# Patient Record
Sex: MALE | Race: WHITE | NOT HISPANIC OR LATINO | Employment: FULL TIME | URBAN - METROPOLITAN AREA
[De-identification: names, ages, dates, MRNs, and addresses within clinical notes are randomized per-mention and may not be internally consistent; named-entity substitution may affect disease eponyms.]

---

## 2017-01-11 ENCOUNTER — CLINICAL SUPPORT (OUTPATIENT)
Dept: FAMILY MEDICINE | Facility: CLINIC | Age: 42
End: 2017-01-11
Payer: COMMERCIAL

## 2017-01-11 DIAGNOSIS — Z28.39 IMMUNIZATION DEFICIENCY: Primary | ICD-10-CM

## 2017-01-11 PROCEDURE — 99999 PR PBB SHADOW E&M-EST. PATIENT-LVL I: CPT | Mod: PBBFAC,,,

## 2017-01-11 PROCEDURE — 90471 IMMUNIZATION ADMIN: CPT | Mod: S$GLB,,, | Performed by: INTERNAL MEDICINE

## 2017-01-11 PROCEDURE — 90715 TDAP VACCINE 7 YRS/> IM: CPT | Mod: S$GLB,,, | Performed by: INTERNAL MEDICINE

## 2017-01-11 NOTE — PROGRESS NOTES
Two patient identifiers verified.  Allergies reviewed.  Tdap IM administered to Lright deltoid per MD order.  Patient tolerated injection well; no redness, bleeding, or bruising noted to injection site.  Patient instructed to remain in clinic setting for 15 minutes.  Verbalizes understanding.

## 2017-01-31 ENCOUNTER — OFFICE VISIT (OUTPATIENT)
Dept: FAMILY MEDICINE | Facility: CLINIC | Age: 42
End: 2017-01-31
Payer: COMMERCIAL

## 2017-01-31 VITALS — TEMPERATURE: 98 F | SYSTOLIC BLOOD PRESSURE: 134 MMHG | HEART RATE: 80 BPM | DIASTOLIC BLOOD PRESSURE: 98 MMHG

## 2017-01-31 DIAGNOSIS — J40 BRONCHITIS: Primary | ICD-10-CM

## 2017-01-31 PROCEDURE — 99999 PR PBB SHADOW E&M-EST. PATIENT-LVL III: CPT | Mod: PBBFAC,,, | Performed by: INTERNAL MEDICINE

## 2017-01-31 PROCEDURE — 1159F MED LIST DOCD IN RCRD: CPT | Mod: S$GLB,,, | Performed by: INTERNAL MEDICINE

## 2017-01-31 PROCEDURE — 99214 OFFICE O/P EST MOD 30 MIN: CPT | Mod: S$GLB,,, | Performed by: INTERNAL MEDICINE

## 2017-01-31 RX ORDER — AMOXICILLIN AND CLAVULANATE POTASSIUM 875; 125 MG/1; MG/1
1 TABLET, FILM COATED ORAL 2 TIMES DAILY
Qty: 10 TABLET | Refills: 0 | Status: SHIPPED | OUTPATIENT
Start: 2017-01-31 | End: 2017-02-05

## 2017-01-31 NOTE — PATIENT INSTRUCTIONS
Finish entire course of antibiotic (Augmentin) even if you start feeling better before you finish.  - Eat yogurt or take probiotic (over the counter) to decrease risk of diarrhea from antibiotic.    Use nasal steroid spray (Flonase, Nasonex, Nasacort, Rhinocort) twice daily until symptoms get better, then decrease to as needed.

## 2017-01-31 NOTE — PROGRESS NOTES
Subjective:        Patient ID: Fitz Venegas Jr. is a 41 y.o. male.    Chief Complaint: URI    HPI   Fitz Venegas Jr. presents with c/o sinus congestion, cough and other cold sx that started last week.  Pt reports fever (Tmax 101), sinus pressure and congestion, discolored rhinorrhea, postnasal drip, sore throat, productive cough and chest congestion.  He has been taking Ibuprofen to control the fever and Theraflu.  Pt think sx were set off by working in an environment that contained mold last week.  UTD with flu shot.  Pt has  baby at home so he has not been sleeping the past 2 days.    Review of Systems  as per HPI      Objective:        Vitals:    17 1236   BP: (!) 134/98   Pulse: 80   Temp: 98.1 °F (36.7 °C)     Physical Exam   Constitutional: He is oriented to person, place, and time. He appears well-developed and well-nourished. No distress.   - congested, hoarse   HENT:   Head: Normocephalic and atraumatic.   Right Ear: External ear normal.   Left Ear: External ear normal.   Mouth/Throat: No oropharyngeal exudate.   - +b/l clear middle ear effusions  - nasal turbinates erythematous and boggy  - oropharynx erythematous; no tonsillar edema   Eyes: Conjunctivae and EOM are normal.   Neck: Normal range of motion. Neck supple.   Cardiovascular: Normal rate, regular rhythm and normal heart sounds.    Pulmonary/Chest: Effort normal and breath sounds normal. No respiratory distress. He has no wheezes. He has no rales.   - no bronchial breath sounds or ronchi   Lymphadenopathy:     He has no cervical adenopathy.   Neurological: He is alert and oriented to person, place, and time.   Skin: Skin is warm and dry.   Vitals reviewed.          Assessment:         1. Bronchitis              Plan:         Fitz was seen today for uri.    Diagnoses and all orders for this visit:    Bronchitis: Given severity of sx, start Augmentin x 5 days.  Counseled pt this could still be viral and may take another 1-2  weeks to clear up.  Recommend continuing Tylenol or NSAIDs for fever and pain; also OTC decongestant, nasal steroid spray, Guaifenesin and DM.  -     amoxicillin-clavulanate 875-125mg (AUGMENTIN) 875-125 mg per tablet; Take 1 tablet by mouth 2 (two) times daily.          Return PRN

## 2017-03-27 ENCOUNTER — OFFICE VISIT (OUTPATIENT)
Dept: FAMILY MEDICINE | Facility: CLINIC | Age: 42
End: 2017-03-27
Payer: COMMERCIAL

## 2017-03-27 VITALS
BODY MASS INDEX: 41.28 KG/M2 | HEIGHT: 73 IN | WEIGHT: 311.5 LBS | TEMPERATURE: 98 F | HEART RATE: 76 BPM | DIASTOLIC BLOOD PRESSURE: 92 MMHG | SYSTOLIC BLOOD PRESSURE: 120 MMHG

## 2017-03-27 DIAGNOSIS — I10 ESSENTIAL HYPERTENSION: Primary | ICD-10-CM

## 2017-03-27 DIAGNOSIS — Z00.00 ANNUAL PHYSICAL EXAM: Primary | ICD-10-CM

## 2017-03-27 PROCEDURE — 99214 OFFICE O/P EST MOD 30 MIN: CPT | Mod: S$GLB,,, | Performed by: INTERNAL MEDICINE

## 2017-03-27 PROCEDURE — 1160F RVW MEDS BY RX/DR IN RCRD: CPT | Mod: S$GLB,,, | Performed by: INTERNAL MEDICINE

## 2017-03-27 PROCEDURE — 3080F DIAST BP >= 90 MM HG: CPT | Mod: S$GLB,,, | Performed by: INTERNAL MEDICINE

## 2017-03-27 PROCEDURE — 99999 PR PBB SHADOW E&M-EST. PATIENT-LVL III: CPT | Mod: PBBFAC,,, | Performed by: INTERNAL MEDICINE

## 2017-03-27 PROCEDURE — 3074F SYST BP LT 130 MM HG: CPT | Mod: S$GLB,,, | Performed by: INTERNAL MEDICINE

## 2017-03-27 RX ORDER — LOSARTAN POTASSIUM AND HYDROCHLOROTHIAZIDE 12.5; 5 MG/1; MG/1
1 TABLET ORAL DAILY
Qty: 30 TABLET | Refills: 2 | Status: SHIPPED | OUTPATIENT
Start: 2017-03-27 | End: 2017-05-26 | Stop reason: SDUPTHER

## 2017-03-27 NOTE — PROGRESS NOTES
"Subjective:        Patient ID: Fitz Venegas Jr. is a 42 y.o. male.    Chief Complaint: Hypertension    HPI   Fitz Venegas Jr. presents with c/o fatigue and not feeling well.  Pt reports generalized fatigue, lack of energy and motivation to exercise.  He reports "spurts" of energy but currently doesn't exercise regularly.  He reports having elevated bp readings lately, up to 180s systolic, 90s diastolic.  He currently takes no bp medications.  He reports episodes of HA, dizziness and blurry vision (out of focus peripheral vision).  Usually HA resolves with NSAID and sx resolve spontaneously after resting or sleeping.  No chest pain, SOB, weakness.    Pt has a  daughter at home but she has been sleeping well at night so pt is sleeping.  He started trying to eat healthier 1 week ago: vegetables, chicken or turkey, limited beef and pork, limited sugar.  Pt has gradually gained weight over the past 4 years.  He used to run and do yoga for exercise.    Review of Systems  as per HPI      Objective:        Vitals:    17 1548   BP: (!) 120/92   Pulse: 76   Temp: 98.2 °F (36.8 °C)     Physical Exam   Constitutional: He is oriented to person, place, and time. He appears well-developed and well-nourished. No distress.   HENT:   Head: Normocephalic and atraumatic.   Right Ear: External ear normal.   Left Ear: External ear normal.   Nose: Nose normal.   Mouth/Throat: Oropharynx is clear and moist.   Eyes: Conjunctivae and EOM are normal.   Neck: Normal range of motion. Neck supple.   Cardiovascular: Normal rate, regular rhythm and normal heart sounds.    Pulmonary/Chest: Effort normal and breath sounds normal. No respiratory distress.   Neurological: He is alert and oriented to person, place, and time. No cranial nerve deficit.   Skin: Skin is warm and dry.   Psychiatric: He has a normal mood and affect. His behavior is normal. Judgment and thought content normal.   Vitals reviewed.          Assessment:     "     1. Essential hypertension              Plan:         Fitz was seen today for hypertension.    Diagnoses and all orders for this visit:    Essential hypertension: Given dizziness, HA and blurry vision, start Losartan/HCTZ 50/12.5mg qd; recommend taking in the AM due to mild diuretic effect.  Continue with healthy diet changes.  Encouraged pt to start regular exercise, walking.  Get home bp monitor and start checking bp once daily in the AM.  Send message in 2 weeks with bp values.  Can likely discontinue bp meds if pt is able to lose weight.  -     losartan-hydrochlorothiazide 50-12.5 mg (HYZAAR) 50-12.5 mg per tablet; Take 1 tablet by mouth once daily.          Return in 2  months for annual exam.    Patient Instructions       Controlling High Blood Pressure  High blood pressure (hypertension) is often called the silent killer. This is because many people who have it dont know it. High blood pressure is defined as 140/90 mm Hg or higher. Know your blood pressure and remember to check it regularly. Doing so can save your life. Here are some things you can do to help control your blood pressure.    Choose heart-healthy foods  · Select low-salt, low-fat foods. Limit sodium intake to 2,400 mg per day or the amount suggested by your healthcare provider.  · Limit canned, dried, cured, packaged, and fast foods. These can contain a lot of salt.  · Eat 8 to 10 servings of fruits and vegetables every day.  · Choose lean meats, fish, or chicken.  · Eat whole-grain pasta, brown rice, and beans.  · Eat 2 to 3 servings of low-fat or fat-free dairy products.  · Ask your doctor about the DASH eating plan. This plan helps reduce blood pressure.  · When you go to a restaurant, ask that your meal be prepared with no added salt.  Maintain a healthy weight  · Ask your healthcare provider how many calories to eat a day. Then stick to that number.  · Ask your healthcare provider what weight range is healthiest for you. If you are  overweight, a weight loss of only 3% to 5% of your body weight can help lower blood pressure. Generally, a good weight loss goal is to lose 10% of your body weight in a year.  · Limit snacks and sweets.  · Get regular exercise.  Get up and get active  · Choose activities you enjoy. Find ones you can do with friends or family. This includes bicycling, dancing, walking, and jogging.  · Park farther away from building entrances.  · Use stairs instead of the elevator.  · When you can, walk or bike instead of driving.  · [place] leaves, garden, or do household repairs.  · Be active at a moderate to vigorous level of physical activity for at least 40 minutes for a minimum of 3 to 4 days a week.   Manage stress  · Make time to relax and enjoy life. Find time to laugh.  · Communicate your concerns with your loved ones and your healthcare provider.  · Visit with family and friends, and keep up with hobbies.  Limit alcohol and quit smoking  · Men should have no more than 2 drinks per day.  · Women should have no more than 1 drink per day.  · Talk with your healthcare provider about quitting smoking. Smoking significantly increases your risk for heart disease and stroke. Ask your healthcare provider about community smoking cessation programs and other options.  Medicines  If lifestyle changes arent enough, your healthcare provider may prescribe high blood pressure medicine. Take all medicines as prescribed. If you have any questions about your medicines, ask your healthcare provider before stopping or changing them.   Date Last Reviewed: 4/27/2016  © 0892-4222 [employer]. [address] [place] [address], [place] [address]. All rights reserved. This information is not intended as a substitute for professional medical care. Always follow your healthcare professional's instructions.

## 2017-03-27 NOTE — PATIENT INSTRUCTIONS
Controlling High Blood Pressure  High blood pressure (hypertension) is often called the silent killer. This is because many people who have it dont know it. High blood pressure is defined as 140/90 mm Hg or higher. Know your blood pressure and remember to check it regularly. Doing so can save your life. Here are some things you can do to help control your blood pressure.    Choose heart-healthy foods  · Select low-salt, low-fat foods. Limit sodium intake to 2,400 mg per day or the amount suggested by your healthcare provider.  · Limit canned, dried, cured, packaged, and fast foods. These can contain a lot of salt.  · Eat 8 to 10 servings of fruits and vegetables every day.  · Choose lean meats, fish, or chicken.  · Eat whole-grain pasta, brown rice, and beans.  · Eat 2 to 3 servings of low-fat or fat-free dairy products.  · Ask your doctor about the DASH eating plan. This plan helps reduce blood pressure.  · When you go to a restaurant, ask that your meal be prepared with no added salt.  Maintain a healthy weight  · Ask your healthcare provider how many calories to eat a day. Then stick to that number.  · Ask your healthcare provider what weight range is healthiest for you. If you are overweight, a weight loss of only 3% to 5% of your body weight can help lower blood pressure. Generally, a good weight loss goal is to lose 10% of your body weight in a year.  · Limit snacks and sweets.  · Get regular exercise.  Get up and get active  · Choose activities you enjoy. Find ones you can do with friends or family. This includes bicycling, dancing, walking, and jogging.  · Park farther away from building entrances.  · Use stairs instead of the elevator.  · When you can, walk or bike instead of driving.  · [place] leaves, garden, or do household repairs.  · Be active at a moderate to vigorous level of physical activity for at least 40 minutes for a minimum of 3 to 4 days a week.   Manage stress  · Make time to relax and enjoy  life. Find time to laugh.  · Communicate your concerns with your loved ones and your healthcare provider.  · Visit with family and friends, and keep up with hobbies.  Limit alcohol and quit smoking  · Men should have no more than 2 drinks per day.  · Women should have no more than 1 drink per day.  · Talk with your healthcare provider about quitting smoking. Smoking significantly increases your risk for heart disease and stroke. Ask your healthcare provider about community smoking cessation programs and other options.  Medicines  If lifestyle changes arent enough, your healthcare provider may prescribe high blood pressure medicine. Take all medicines as prescribed. If you have any questions about your medicines, ask your healthcare provider before stopping or changing them.   Date Last Reviewed: 4/27/2016  © 9684-1809 [employer] [address] [place] [address], [place] [address]. All rights reserved. This information is not intended as a substitute for professional medical care. Always follow your healthcare professional's instructions.

## 2017-05-24 ENCOUNTER — LAB VISIT (OUTPATIENT)
Dept: LAB | Facility: HOSPITAL | Age: 42
End: 2017-05-24
Attending: INTERNAL MEDICINE
Payer: COMMERCIAL

## 2017-05-24 DIAGNOSIS — Z00.00 ANNUAL PHYSICAL EXAM: ICD-10-CM

## 2017-05-24 LAB
ALBUMIN SERPL BCP-MCNC: 3.9 G/DL
ALP SERPL-CCNC: 80 U/L
ALT SERPL W/O P-5'-P-CCNC: 55 U/L
ANION GAP SERPL CALC-SCNC: 10 MMOL/L
AST SERPL-CCNC: 25 U/L
BASOPHILS # BLD AUTO: 0.05 K/UL
BASOPHILS NFR BLD: 0.7 %
BILIRUB SERPL-MCNC: 1.1 MG/DL
BUN SERPL-MCNC: 14 MG/DL
CALCIUM SERPL-MCNC: 9.3 MG/DL
CHLORIDE SERPL-SCNC: 103 MMOL/L
CHOLEST/HDLC SERPL: 5.2 {RATIO}
CO2 SERPL-SCNC: 24 MMOL/L
CREAT SERPL-MCNC: 1 MG/DL
DIFFERENTIAL METHOD: ABNORMAL
EOSINOPHIL # BLD AUTO: 0.2 K/UL
EOSINOPHIL NFR BLD: 3.1 %
ERYTHROCYTE [DISTWIDTH] IN BLOOD BY AUTOMATED COUNT: 12.6 %
EST. GFR  (AFRICAN AMERICAN): >60 ML/MIN/1.73 M^2
EST. GFR  (NON AFRICAN AMERICAN): >60 ML/MIN/1.73 M^2
GLUCOSE SERPL-MCNC: 104 MG/DL
HCT VFR BLD AUTO: 47.3 %
HDL/CHOLESTEROL RATIO: 19.4 %
HDLC SERPL-MCNC: 155 MG/DL
HDLC SERPL-MCNC: 30 MG/DL
HGB BLD-MCNC: 16.2 G/DL
LDLC SERPL CALC-MCNC: 75.6 MG/DL
LYMPHOCYTES # BLD AUTO: 2.6 K/UL
LYMPHOCYTES NFR BLD: 37.3 %
MCH RBC QN AUTO: 31.6 PG
MCHC RBC AUTO-ENTMCNC: 34.2 %
MCV RBC AUTO: 92 FL
MONOCYTES # BLD AUTO: 0.7 K/UL
MONOCYTES NFR BLD: 10.8 %
NEUTROPHILS # BLD AUTO: 3.3 K/UL
NEUTROPHILS NFR BLD: 47.5 %
NONHDLC SERPL-MCNC: 125 MG/DL
PLATELET # BLD AUTO: 228 K/UL
PMV BLD AUTO: 10.7 FL
POTASSIUM SERPL-SCNC: 4.4 MMOL/L
PROT SERPL-MCNC: 7.4 G/DL
RBC # BLD AUTO: 5.13 M/UL
SODIUM SERPL-SCNC: 137 MMOL/L
TRIGL SERPL-MCNC: 247 MG/DL
WBC # BLD AUTO: 6.87 K/UL

## 2017-05-24 PROCEDURE — 36415 COLL VENOUS BLD VENIPUNCTURE: CPT | Mod: PO

## 2017-05-24 PROCEDURE — 85025 COMPLETE CBC W/AUTO DIFF WBC: CPT

## 2017-05-24 PROCEDURE — 80061 LIPID PANEL: CPT

## 2017-05-24 PROCEDURE — 80053 COMPREHEN METABOLIC PANEL: CPT

## 2017-05-26 ENCOUNTER — OFFICE VISIT (OUTPATIENT)
Dept: FAMILY MEDICINE | Facility: CLINIC | Age: 42
End: 2017-05-26
Payer: COMMERCIAL

## 2017-05-26 VITALS — HEART RATE: 72 BPM | TEMPERATURE: 99 F | DIASTOLIC BLOOD PRESSURE: 80 MMHG | SYSTOLIC BLOOD PRESSURE: 110 MMHG

## 2017-05-26 DIAGNOSIS — Z00.00 ANNUAL PHYSICAL EXAM: Primary | ICD-10-CM

## 2017-05-26 DIAGNOSIS — I10 ESSENTIAL HYPERTENSION: ICD-10-CM

## 2017-05-26 DIAGNOSIS — E66.01 MORBID OBESITY WITH BMI OF 40.0-44.9, ADULT: ICD-10-CM

## 2017-05-26 DIAGNOSIS — E78.5 HYPERLIPIDEMIA, UNSPECIFIED HYPERLIPIDEMIA TYPE: ICD-10-CM

## 2017-05-26 DIAGNOSIS — R73.9 BLOOD GLUCOSE ELEVATED: ICD-10-CM

## 2017-05-26 PROCEDURE — 99999 PR PBB SHADOW E&M-EST. PATIENT-LVL III: CPT | Mod: PBBFAC,,, | Performed by: INTERNAL MEDICINE

## 2017-05-26 PROCEDURE — 99396 PREV VISIT EST AGE 40-64: CPT | Mod: S$GLB,,, | Performed by: INTERNAL MEDICINE

## 2017-05-26 RX ORDER — LOSARTAN POTASSIUM AND HYDROCHLOROTHIAZIDE 12.5; 5 MG/1; MG/1
1 TABLET ORAL DAILY
Qty: 90 TABLET | Refills: 3 | Status: ON HOLD | OUTPATIENT
Start: 2017-05-26 | End: 2019-05-03 | Stop reason: HOSPADM

## 2017-05-26 NOTE — PROGRESS NOTES
Subjective:        Patient ID: Fitz Venegas Jr. is a 42 y.o. male.    Chief Complaint: Annual Exam    HPI   Fitz Venegas Jr. presents for annual exam.  Pt has changed his diet, eating more of a Mediterranean diet, decreased fried foods.  He thinks he has been eating more/larger portions so he has not lost any weight.  No regular exercise but planning to start walking in the evenings.  Eats sweets ~once/week.    Review of Systems   Constitutional: Positive for fatigue (2/2 work, weight, baby). Negative for activity change and unexpected weight change.   HENT: Negative for ear pain, hearing loss, sore throat and trouble swallowing.    Eyes: Negative for visual disturbance (no glasses or contacts).   Respiratory: Negative for chest tightness and shortness of breath.    Cardiovascular: Negative for chest pain and leg swelling.   Musculoskeletal: Positive for arthralgias (2/2 weight).   Skin: Negative for rash.   Allergic/Immunologic: Positive for environmental allergies (very mild to pet dander, doesn't need medication).   Neurological: Negative for dizziness and light-headedness.   Psychiatric/Behavioral: Negative for dysphoric mood. The patient is not nervous/anxious.            Objective:        Vitals:    05/26/17 1001   BP: 110/80   Pulse: 72   Temp: 98.5 °F (36.9 °C)     Physical Exam   Constitutional: He is oriented to person, place, and time. He appears well-developed and well-nourished. No distress.   HENT:   Head: Normocephalic and atraumatic.   Right Ear: External ear normal.   Left Ear: External ear normal.   Nose: Nose normal.   Mouth/Throat: Oropharynx is clear and moist.   - bilateral ear canals clear, tympanic membranes visualized - normal color and light reflex   Eyes: Conjunctivae and EOM are normal.   Neck: Normal range of motion. Neck supple. No thyromegaly present.   Cardiovascular: Normal rate, regular rhythm, normal heart sounds and intact distal pulses.    No murmur heard.  Pulmonary/Chest:  Effort normal and breath sounds normal. No respiratory distress.   Abdominal: Soft. He exhibits no distension.   Musculoskeletal: He exhibits no edema.   Neurological: He is alert and oriented to person, place, and time.   Skin: Skin is warm and dry.   Psychiatric: He has a normal mood and affect. His behavior is normal. Judgment and thought content normal.   Vitals reviewed.      Most recent lab results reviewed with patient.    Assessment:         1. Annual physical exam    2. Essential hypertension    3. Morbid obesity with BMI of 40.0-44.9, adult    4. Hyperlipidemia, unspecified hyperlipidemia type    5. Blood glucose elevated              Plan:         Fitz was seen today for annual exam.    Diagnoses and all orders for this visit:    Annual physical exam  - Discussed prostate cancer screening recommendations.  No famhx of prostate CA.    Essential hypertension: Well controlled; continue Losartan/HCTZ 50/12.5mg qd.  -     losartan-hydrochlorothiazide 50-12.5 mg (HYZAAR) 50-12.5 mg per tablet; Take 1 tablet by mouth once daily.    Morbid obesity with BMI of 40.0-44.9, adult    Hyperlipidemia, unspecified hyperlipidemia type    Blood glucose elevated    - Discussed continuing with Mediterranean diet.  Next step: limit carbohydrates and decrease portion sizes.  - Start regular physical activity with walking in the evenings.  Energy level and joint discomfort should improve with weight loss.          Return in 1  year for annual exam or sooner PRN.

## 2019-05-02 PROBLEM — R07.9 CHEST PAIN: Status: ACTIVE | Noted: 2019-05-02

## 2019-05-03 PROBLEM — R07.9 CHEST PAIN: Status: RESOLVED | Noted: 2019-05-02 | Resolved: 2019-05-03

## 2019-10-15 ENCOUNTER — OFFICE VISIT (OUTPATIENT)
Dept: URGENT CARE | Facility: CLINIC | Age: 44
End: 2019-10-15

## 2019-10-15 VITALS
DIASTOLIC BLOOD PRESSURE: 91 MMHG | RESPIRATION RATE: 18 BRPM | SYSTOLIC BLOOD PRESSURE: 140 MMHG | HEIGHT: 74 IN | HEART RATE: 104 BPM | TEMPERATURE: 100 F | BODY MASS INDEX: 40.43 KG/M2 | OXYGEN SATURATION: 92 % | WEIGHT: 315 LBS

## 2019-10-15 DIAGNOSIS — B96.89 ACUTE BACTERIAL BRONCHITIS: ICD-10-CM

## 2019-10-15 DIAGNOSIS — J20.8 ACUTE BACTERIAL BRONCHITIS: ICD-10-CM

## 2019-10-15 DIAGNOSIS — R50.9 FEVER, UNSPECIFIED FEVER CAUSE: Primary | ICD-10-CM

## 2019-10-15 LAB
CTP QC/QA: YES
FLUAV AG NPH QL: NEGATIVE
FLUBV AG NPH QL: NEGATIVE

## 2019-10-15 PROCEDURE — 99214 PR OFFICE/OUTPT VISIT, EST, LEVL IV, 30-39 MIN: ICD-10-PCS | Mod: 25,S$GLB,, | Performed by: EMERGENCY MEDICINE

## 2019-10-15 PROCEDURE — 71046 XR CHEST PA AND LATERAL: ICD-10-PCS | Mod: S$GLB,,, | Performed by: RADIOLOGY

## 2019-10-15 PROCEDURE — 96372 PR INJECTION,THERAP/PROPH/DIAG2ST, IM OR SUBCUT: ICD-10-PCS | Mod: S$GLB,,, | Performed by: EMERGENCY MEDICINE

## 2019-10-15 PROCEDURE — 71046 X-RAY EXAM CHEST 2 VIEWS: CPT | Mod: S$GLB,,, | Performed by: RADIOLOGY

## 2019-10-15 PROCEDURE — 87804 POCT INFLUENZA A/B: ICD-10-PCS | Mod: 59,QW,S$GLB, | Performed by: EMERGENCY MEDICINE

## 2019-10-15 PROCEDURE — 87804 INFLUENZA ASSAY W/OPTIC: CPT | Mod: QW,S$GLB,, | Performed by: EMERGENCY MEDICINE

## 2019-10-15 PROCEDURE — 96372 THER/PROPH/DIAG INJ SC/IM: CPT | Mod: S$GLB,,, | Performed by: EMERGENCY MEDICINE

## 2019-10-15 PROCEDURE — 99214 OFFICE O/P EST MOD 30 MIN: CPT | Mod: 25,S$GLB,, | Performed by: EMERGENCY MEDICINE

## 2019-10-15 RX ORDER — CODEINE PHOSPHATE AND GUAIFENESIN 10; 100 MG/5ML; MG/5ML
5 SOLUTION ORAL EVERY 6 HOURS PRN
Qty: 150 ML | Refills: 0 | Status: SHIPPED | OUTPATIENT
Start: 2019-10-15 | End: 2019-10-25

## 2019-10-15 RX ORDER — AMOXICILLIN AND CLAVULANATE POTASSIUM 875; 125 MG/1; MG/1
1 TABLET, FILM COATED ORAL 2 TIMES DAILY
Qty: 14 TABLET | Refills: 0 | Status: SHIPPED | OUTPATIENT
Start: 2019-10-15 | End: 2019-10-22

## 2019-10-15 RX ORDER — BETAMETHASONE SODIUM PHOSPHATE AND BETAMETHASONE ACETATE 3; 3 MG/ML; MG/ML
9 INJECTION, SUSPENSION INTRA-ARTICULAR; INTRALESIONAL; INTRAMUSCULAR; SOFT TISSUE
Status: COMPLETED | OUTPATIENT
Start: 2019-10-15 | End: 2019-10-15

## 2019-10-15 RX ADMIN — BETAMETHASONE SODIUM PHOSPHATE AND BETAMETHASONE ACETATE 9 MG: 3; 3 INJECTION, SUSPENSION INTRA-ARTICULAR; INTRALESIONAL; INTRAMUSCULAR; SOFT TISSUE at 05:10

## 2019-10-15 NOTE — PROGRESS NOTES
"Subjective:       Patient ID: Fitz Venegas Jr. is a 44 y.o. male.    Vitals:  height is 6' 2" (1.88 m) and weight is 151 kg (333 lb) (abnormal). His tympanic temperature is 99.6 °F (37.6 °C). His blood pressure is 140/91 (abnormal) and his pulse is 104. His respiration is 18 and oxygen saturation is 92% (abnormal).     Chief Complaint: Fever    Fever    This is a new problem. The current episode started in the past 7 days (Saturday). The problem has been gradually worsening. The maximum temperature noted was 100 to 100.9 F. The temperature was taken using a tympanic thermometer. Associated symptoms include congestion, coughing, headaches, muscle aches, nausea, sleepiness and a sore throat. Pertinent negatives include no diarrhea, ear pain, rash, vomiting or wheezing. He has tried NSAIDs (at 8am ) for the symptoms. The treatment provided no relief.   Risk factors: sick contacts    Risk factors comment:  Daughter had an ear infection       Constitution: Positive for fever. Negative for chills, sweating and fatigue.   HENT: Positive for congestion and sore throat. Negative for ear pain, sinus pain, sinus pressure and voice change.    Neck: Negative for painful lymph nodes.   Eyes: Negative for eye redness.   Respiratory: Positive for cough. Negative for chest tightness, sputum production, bloody sputum, COPD, shortness of breath, stridor, wheezing and asthma.    Gastrointestinal: Positive for nausea. Negative for vomiting and diarrhea.   Musculoskeletal: Negative for muscle ache.   Skin: Negative for rash.   Allergic/Immunologic: Negative for seasonal allergies and asthma.   Neurological: Positive for headaches.   Hematologic/Lymphatic: Negative for swollen lymph nodes.       Objective:      Physical Exam   Constitutional: He is oriented to person, place, and time. He appears well-developed and well-nourished. He is cooperative.  Non-toxic appearance. He does not have a sickly appearance. He does not appear ill. No " distress.   Obese   HENT:   Head: Normocephalic and atraumatic.   Right Ear: Hearing, tympanic membrane, external ear and ear canal normal.   Left Ear: Hearing, tympanic membrane, external ear and ear canal normal.   Nose: Mucosal edema and rhinorrhea present. No nasal deformity. No epistaxis. Right sinus exhibits frontal sinus tenderness. Right sinus exhibits no maxillary sinus tenderness. Left sinus exhibits frontal sinus tenderness. Left sinus exhibits no maxillary sinus tenderness.   Mouth/Throat: Uvula is midline, oropharynx is clear and moist and mucous membranes are normal. No trismus in the jaw. Normal dentition. No uvula swelling. No oropharyngeal exudate, posterior oropharyngeal edema or posterior oropharyngeal erythema.   Eyes: Conjunctivae and lids are normal. No scleral icterus.   Neck: Trachea normal, full passive range of motion without pain and phonation normal. Neck supple. No neck rigidity. No edema and no erythema present.   Cardiovascular: Normal rate, regular rhythm, normal heart sounds, intact distal pulses and normal pulses.   Pulmonary/Chest: Effort normal. No respiratory distress. He has no decreased breath sounds. He has no rhonchi. He has rales in the left middle field.   Frequent cough on exam   Abdominal: Normal appearance.   Musculoskeletal: Normal range of motion. He exhibits no edema or deformity.   Neurological: He is alert and oriented to person, place, and time. He exhibits normal muscle tone. Coordination normal.   Skin: Skin is warm, dry, intact, not diaphoretic and not pale.   Psychiatric: He has a normal mood and affect. His speech is normal and behavior is normal. Judgment and thought content normal. Cognition and memory are normal.   Nursing note and vitals reviewed.        Assessment:       1. Fever, unspecified fever cause    2. Acute bacterial bronchitis        Plan:         Fever, unspecified fever cause  -     POCT Influenza A/B  -     X-Ray Chest PA And Lateral; Future;  Expected date: 10/15/2019    Acute bacterial bronchitis    Other orders  -     betamethasone acetate-betamethasone sodium phosphate injection 9 mg  -     guaifenesin-codeine 100-10 mg/5 ml (GUAIFENESIN AC)  mg/5 mL syrup; Take 5 mLs by mouth every 6 (six) hours as needed for Cough.  Dispense: 150 mL; Refill: 0  -     amoxicillin-clavulanate 875-125mg (AUGMENTIN) 875-125 mg per tablet; Take 1 tablet by mouth 2 (two) times daily. for 7 days  Dispense: 14 tablet; Refill: 0     Patient was treated with antibiotics on today's visit due to the presence of rales to his left middle lung fields the concern for developing pneumonia         X-ray Chest Pa And Lateral    Result Date: 10/15/2019  EXAMINATION: XR CHEST PA AND LATERAL CLINICAL HISTORY: Fever, unspecified TECHNIQUE: PA and lateral views of the chest were performed. COMPARISON: 05/02/2019. FINDINGS: The trachea is unremarkable.  The cardiomediastinal silhouette is within normal limits.  The hilar structures are unremarkable.  The hemidiaphragms are within normal limits.  There is no evidence of free air beneath the hemidiaphragms.  There are no pleural effusions.  There is no evidence of a pneumothorax.  There is no evidence of pneumomediastinum.  No airspace opacity is present.  The osseous structures are unremarkable.     No acute process. Electronically signed by: Luke Macias MD Date:    10/15/2019 Time:    17:35      Patient Instructions   Please return here or go to the Emergency Department for any concerns or worsening of condition.      Follow up with Primary Care if not improved in 7-10 Days:  [phone]      Bronchitis, Antibiotic Treatment (Adult)    Bronchitis is an infection of the air passages (bronchial tubes) in your lungs. It often occurs when you have a cold. This illness is contagious during the first few days and is spread through the air by coughing and sneezing, or by direct contact (touching the sick person and then touching your own eyes,  nose, or mouth).  Symptoms of bronchitis include cough with mucus (phlegm) and low-grade fever. Bronchitis usually lasts 7 to 14 days. Mild cases can be treated with simple home remedies. More severe infection is treated with an antibiotic.  Home care  Follow these guidelines when caring for yourself at home:  · If your symptoms are severe, rest at home for the first 2 to 3 days. When you go back to your usual activities, don't let yourself get too tired.  · Do not smoke. Also avoid being exposed to secondhand smoke.  · You may use over-the-counter medicines to control fever or pain, unless another medicine was prescribed. (Note: If you have chronic liver or kidney disease or have ever had a stomach ulcer or gastrointestinal bleeding, talk with your healthcare provider before using these medicines. Also talk to your provider if you are taking medicine to prevent blood clots.) Aspirin should never be given to anyone younger than 18 years of age who is ill with a viral infection or fever. It may cause severe liver or brain damage.  · Your appetite may be poor, so a light diet is fine. Avoid dehydration by drinking 6 to 8 glasses of fluids per day (such as water, soft drinks, sports drinks, juices, tea, or soup). Extra fluids will help loosen secretions in the nose and lungs.  · Over-the-counter cough, cold, and sore-throat medicines will not shorten the length of the illness, but they may be helpful to reduce symptoms. (Note: Do not use decongestants if you have high blood pressure.)  · Finish all antibiotic medicine. Do this even if you are feeling better after only a few days.  Follow-up care  Follow up with your healthcare provider, or as advised. If you had an X-ray or ECG (electrocardiogram), a specialist will review it. You will be notified of any new findings that may affect your care.  Note: If you are age 65 or older, or if you have a chronic lung disease or condition that affects your immune system, or you  smoke, talk to your healthcare provider about having pneumococcal vaccinations and a yearly influenza vaccination (flu shot).  When to seek medical advice  Call your healthcare provider right away if any of these occur:  · Fever of 100.4°F (38°C) or higher  · Coughing up increased amounts of colored sputum  · Weakness, drowsiness, headache, facial pain, ear pain, or a stiff neck  Call 911, or get immediate medical care  Contact emergency services right away if any of these occur.  · Coughing up blood  · Worsening weakness, drowsiness, headache, or stiff neck  · Trouble breathing, wheezing, or pain with breathing  Date Last Reviewed: 9/13/2015  © 9983-9242 [employer]. [address] [place] [address], [place] [address]. All rights reserved. This information is not intended as a substitute for professional medical care. Always follow your healthcare professional's instructions.

## 2019-10-15 NOTE — PATIENT INSTRUCTIONS
Please return here or go to the Emergency Department for any concerns or worsening of condition.      Follow up with Primary Care if not improved in 7-10 Days:  [phone]      Bronchitis, Antibiotic Treatment (Adult)    Bronchitis is an infection of the air passages (bronchial tubes) in your lungs. It often occurs when you have a cold. This illness is contagious during the first few days and is spread through the air by coughing and sneezing, or by direct contact (touching the sick person and then touching your own eyes, nose, or mouth).  Symptoms of bronchitis include cough with mucus (phlegm) and low-grade fever. Bronchitis usually lasts 7 to 14 days. Mild cases can be treated with simple home remedies. More severe infection is treated with an antibiotic.  Home care  Follow these guidelines when caring for yourself at home:  · If your symptoms are severe, rest at home for the first 2 to 3 days. When you go back to your usual activities, don't let yourself get too tired.  · Do not smoke. Also avoid being exposed to secondhand smoke.  · You may use over-the-counter medicines to control fever or pain, unless another medicine was prescribed. (Note: If you have chronic liver or kidney disease or have ever had a stomach ulcer or gastrointestinal bleeding, talk with your healthcare provider before using these medicines. Also talk to your provider if you are taking medicine to prevent blood clots.) Aspirin should never be given to anyone younger than 18 years of age who is ill with a viral infection or fever. It may cause severe liver or brain damage.  · Your appetite may be poor, so a light diet is fine. Avoid dehydration by drinking 6 to 8 glasses of fluids per day (such as water, soft drinks, sports drinks, juices, tea, or soup). Extra fluids will help loosen secretions in the nose and lungs.  · Over-the-counter cough, cold, and sore-throat medicines will not shorten the length of the illness, but they may be helpful to  reduce symptoms. (Note: Do not use decongestants if you have high blood pressure.)  · Finish all antibiotic medicine. Do this even if you are feeling better after only a few days.  Follow-up care  Follow up with your healthcare provider, or as advised. If you had an X-ray or ECG (electrocardiogram), a specialist will review it. You will be notified of any new findings that may affect your care.  Note: If you are age 65 or older, or if you have a chronic lung disease or condition that affects your immune system, or you smoke, talk to your healthcare provider about having pneumococcal vaccinations and a yearly influenza vaccination (flu shot).  When to seek medical advice  Call your healthcare provider right away if any of these occur:  · Fever of 100.4°F (38°C) or higher  · Coughing up increased amounts of colored sputum  · Weakness, drowsiness, headache, facial pain, ear pain, or a stiff neck  Call 911, or get immediate medical care  Contact emergency services right away if any of these occur.  · Coughing up blood  · Worsening weakness, drowsiness, headache, or stiff neck  · Trouble breathing, wheezing, or pain with breathing  Date Last Reviewed: 9/13/2015  © 9949-0217 [facility]. [address] [place] [address], [place] [address]. All rights reserved. This information is not intended as a substitute for professional medical care. Always follow your healthcare professional's instructions.

## 2019-11-17 ENCOUNTER — HOSPITAL ENCOUNTER (EMERGENCY)
Facility: HOSPITAL | Age: 44
Discharge: HOME | End: 2019-11-17
Attending: EMERGENCY MEDICINE

## 2019-11-17 VITALS
SYSTOLIC BLOOD PRESSURE: 135 MMHG | HEIGHT: 73 IN | DIASTOLIC BLOOD PRESSURE: 89 MMHG | WEIGHT: 300 LBS | OXYGEN SATURATION: 96 % | RESPIRATION RATE: 20 BRPM | HEART RATE: 85 BPM | BODY MASS INDEX: 39.76 KG/M2 | TEMPERATURE: 98 F

## 2019-11-17 DIAGNOSIS — R05.8 POST-VIRAL COUGH SYNDROME: Primary | ICD-10-CM

## 2019-11-17 DIAGNOSIS — R05.9 COUGH: ICD-10-CM

## 2019-11-17 DIAGNOSIS — R07.89 RIGHT-SIDED CHEST WALL PAIN: ICD-10-CM

## 2019-11-17 PROCEDURE — 99283 EMERGENCY DEPT VISIT LOW MDM: CPT | Mod: 25

## 2019-11-17 PROCEDURE — 25000003 PHARM REV CODE 250: Performed by: EMERGENCY MEDICINE

## 2019-11-17 PROCEDURE — 99285 PR EMERGENCY DEPT VISIT,LEVEL V: ICD-10-PCS | Mod: ,,, | Performed by: EMERGENCY MEDICINE

## 2019-11-17 PROCEDURE — 99285 EMERGENCY DEPT VISIT HI MDM: CPT | Mod: ,,, | Performed by: EMERGENCY MEDICINE

## 2019-11-17 RX ORDER — NAPROXEN 500 MG/1
500 TABLET ORAL
Status: COMPLETED | OUTPATIENT
Start: 2019-11-17 | End: 2019-11-17

## 2019-11-17 RX ORDER — NAPROXEN SODIUM 550 MG/1
550 TABLET ORAL EVERY 12 HOURS PRN
Qty: 20 TABLET | Refills: 0 | Status: SHIPPED | OUTPATIENT
Start: 2019-11-17 | End: 2022-01-25 | Stop reason: ALTCHOICE

## 2019-11-17 RX ADMIN — NAPROXEN 500 MG: 500 TABLET ORAL at 03:11

## 2019-11-17 NOTE — ED TRIAGE NOTES
Patient reports to the ED today with reports of cough x1 month. Patient states that he was treated for possible bronchitis with amoxicillin and steroids. Patient states the cough never got any better.

## 2019-11-17 NOTE — ED NOTES
Patient identifiers verified and correct for Fitz Venegsa  LOC: The patient is awake, alert and aware of environment with an appropriate affect, the patient is oriented x 3 and speaking appropriately.   APPEARANCE: Patient appears comfortable and in no acute distress, patient is clean and well groomed.  SKIN: The skin is warm and dry, color consistent with ethnicity, patient has normal skin turgor and moist mucus membranes, skin intact, no breakdown or bruising noted.   MUSCULOSKELETAL: Patient moving all extremities spontaneously, no swelling noted.  RESPIRATORY: Airway is open and patent, respirations are spontaneous, patient has a normal effort and rate, no accessory muscle use noted, O2 sats noted at 96% on room air. Reports of cough  CARDIAC: Denies chest pain capillary refill < 3 seconds.   GASTRO: Soft and non tender to palpation, no distention noted, normoactive bowel sounds present in all four quadrants. Pt states bowel movements have been regular.  : Pt denies any pain or frequency with urination.  NEURO: Pt opens eyes spontaneously, behavior appropriate to situation, follows commands, facial expression symmetrical, bilateral hand grasp equal and even, purposeful motor response noted, normal sensation in all extremities when touched with a finger.

## 2019-11-17 NOTE — ED PROVIDER NOTES
Encounter Date: 11/17/2019    SCRIBE #1 NOTE: I, Juan Grayson, am scribing for, and in the presence of,  Dr. Pride. I have scribed the following portions of the note - Other sections scribed: HPI, ROS, PE, MDM.       History     Chief Complaint   Patient presents with    Cough     bronchitis about one month ago, was on abx, cough never got better.      Patient is a 44 year old male presenting with a cough. About a month ago, the patient was diagnosed with bronchitis a month ago. He was given antibiotics and cough syrup to treat his symptoms, but his cough never got better. Cough has caused pain to his ribs.  Endorses nausea for the past week.    The history is provided by the patient.     Review of patient's allergies indicates:  No Known Allergies  Past Medical History:   Diagnosis Date    Obesity (BMI 30-39.9) 4/27/2016    Septic joint of left knee joint 2011     Past Surgical History:   Procedure Laterality Date    CORONARY ANGIOGRAPHY N/A 5/3/2019    Procedure: ANGIOGRAM, CORONARY ARTERY;  Surgeon: Joey Carter MD;  Location: [facility] CATH LAB;  Service: Cardiology;  Laterality: N/A;    KNEE ARTHROSCOPY Left     LEFT HEART CATHETERIZATION Right 5/3/2019    Procedure: Left heart cath;  Surgeon: Joey Carter MD;  Location: [facility] CATH LAB;  Service: Cardiology;  Laterality: Right;     Family History   Problem Relation Age of Onset    Heart disease Paternal Uncle         coronary stents    Coronary artery disease Paternal Grandfather         CABG and multiple stents    Hypertension Maternal Uncle      Social History     Tobacco Use    Smoking status: Former Smoker    Tobacco comment: quit @ age 32; smoked x 10 years   Substance Use Topics    Alcohol use: Yes     Comment: weekends    Drug use: No     Review of Systems  General: No fever.  No chills.  Eyes: No visual changes.  Head: No headache.    Integument: No rashes or lesions.  Chest: Cough.  No shortness of breath.  Cardiovascular: No chest  pain.  Abdomen: No abdominal pain.  Nausea.  Urinary: No abnormal urination.  Neurologic: No focal weakness.  No numbness.  Hematologic: No easy bruising.  Endocrine: No excessive thirst or urination.    Physical Exam     Initial Vitals [11/17/19 1426]   BP Pulse Resp Temp SpO2   (!) 136/92 100 20 98.6 °F (37 °C) 96 %      MAP       --         Physical Exam  Appearance: No acute distress.  Skin: No rashes seen.  Good turgor.  No abrasions.  No ecchymoses.  Eyes: No conjunctival injection.  ENT: Oropharynx clear.    Chest: Clear to auscultation bilaterally.  Good air movement.  No wheezes.  No rhonchi.  Cardiovascular: Regular rate and rhythm.  No murmurs. No gallops. No rubs.  Abdomen: Soft.  Not distended.  No guarding.  No rebound. Mild tenderness to right side  Musculoskeletal: Good range of motion all joints.  No deformities.  Neck supple.  No meningismus.   Neurologic: Motor intact.  Sensation intact.  Cerebellar intact.  Cranial nerves intact.  Mental Status:  Alert and oriented x 3.  Appropriate, conversant.    ED Course   Procedures  Labs Reviewed - No data to display       Imaging Results          X-Ray Chest PA And Lateral (Final result)  Result time 11/17/19 15:06:45    Final result by Will Joshi MD (11/17/19 15:06:45)                 Impression:      1. No acute cardiopulmonary process.      Electronically signed by: Will Joshi MD  Date:    11/17/2019  Time:    15:06             Narrative:    EXAMINATION:  XR CHEST PA AND LATERAL    CLINICAL HISTORY:  Cough    TECHNIQUE:  PA and lateral views of the chest were performed.    COMPARISON:  10/15/2019    FINDINGS:  The cardiomediastinal silhouette is not enlarged.  There is no pleural effusion.  The trachea is midline.  The lungs are symmetrically expanded bilaterally without evidence of acute parenchymal process. No large focal consolidation seen.  There is no pneumothorax.  The osseous structures are remarkable for degenerative changes..                                  Medical Decision Making:   History:   Old Medical Records: I decided to obtain old medical records.  Initial Assessment:   Cough with right sided chest pain with deep breath. Doubt PE, likely musculoskeletal. Will get chest x-ray to confirm.   Independently Interpreted Test(s):   I have ordered and independently interpreted X-rays - see prior notes.  Clinical Tests:   Radiological Study: Ordered and Reviewed  ED Management:  3:32 PM  CXR is negative.  Workup c/w musculoskeletal pain from coughing, no SOB, no atelectasis or PNA, doubt PE.  OK to d/c.           Scribe Attestation:   Scribe #1: I performed the above scribed service and the documentation accurately describes the services I performed. I attest to the accuracy of the note.                          Clinical Impression:       ICD-10-CM ICD-9-CM   1. Post-viral cough syndrome R05 786.2   2. Cough R05 786.2   3. Right-sided chest wall pain R07.89 786.52                             Delgado Pride MD  11/17/19 1626       Delgado Pride MD  11/17/19 1626

## 2021-08-04 ENCOUNTER — IMMUNIZATION (OUTPATIENT)
Dept: INTERNAL MEDICINE | Facility: CLINIC | Age: 46
End: 2021-08-04

## 2021-08-04 DIAGNOSIS — Z23 NEED FOR VACCINATION: Primary | ICD-10-CM

## 2021-08-04 PROCEDURE — 91300 COVID-19, MRNA, LNP-S, PF, 30 MCG/0.3 ML DOSE VACCINE: ICD-10-PCS | Mod: ,,, | Performed by: INTERNAL MEDICINE

## 2021-08-04 PROCEDURE — 91300 COVID-19, MRNA, LNP-S, PF, 30 MCG/0.3 ML DOSE VACCINE: CPT | Mod: ,,, | Performed by: INTERNAL MEDICINE

## 2021-08-04 PROCEDURE — 0001A COVID-19, MRNA, LNP-S, PF, 30 MCG/0.3 ML DOSE VACCINE: CPT | Mod: CV19,,, | Performed by: INTERNAL MEDICINE

## 2021-08-04 PROCEDURE — 0001A COVID-19, MRNA, LNP-S, PF, 30 MCG/0.3 ML DOSE VACCINE: ICD-10-PCS | Mod: CV19,,, | Performed by: INTERNAL MEDICINE

## 2021-08-25 ENCOUNTER — IMMUNIZATION (OUTPATIENT)
Dept: INTERNAL MEDICINE | Facility: CLINIC | Age: 46
End: 2021-08-25

## 2021-08-25 DIAGNOSIS — Z23 NEED FOR VACCINATION: Primary | ICD-10-CM

## 2021-08-25 PROCEDURE — 91300 COVID-19, MRNA, LNP-S, PF, 30 MCG/0.3 ML DOSE VACCINE: CPT | Mod: ,,, | Performed by: INTERNAL MEDICINE

## 2021-08-25 PROCEDURE — 91300 COVID-19, MRNA, LNP-S, PF, 30 MCG/0.3 ML DOSE VACCINE: ICD-10-PCS | Mod: ,,, | Performed by: INTERNAL MEDICINE

## 2021-08-25 PROCEDURE — 0002A COVID-19, MRNA, LNP-S, PF, 30 MCG/0.3 ML DOSE VACCINE: ICD-10-PCS | Mod: CV19,,, | Performed by: INTERNAL MEDICINE

## 2021-08-25 PROCEDURE — 0002A COVID-19, MRNA, LNP-S, PF, 30 MCG/0.3 ML DOSE VACCINE: CPT | Mod: CV19,,, | Performed by: INTERNAL MEDICINE

## 2021-08-28 ENCOUNTER — HOSPITAL ENCOUNTER (EMERGENCY)
Facility: HOSPITAL | Age: 46
Discharge: HOME | End: 2021-08-28
Attending: EMERGENCY MEDICINE

## 2021-08-28 VITALS
OXYGEN SATURATION: 96 % | TEMPERATURE: 98 F | HEIGHT: 73 IN | BODY MASS INDEX: 40.02 KG/M2 | SYSTOLIC BLOOD PRESSURE: 164 MMHG | DIASTOLIC BLOOD PRESSURE: 93 MMHG | WEIGHT: 302 LBS | RESPIRATION RATE: 18 BRPM | HEART RATE: 92 BPM

## 2021-08-28 DIAGNOSIS — I83.899 BLEEDING FROM VARICOSE VEIN: Primary | ICD-10-CM

## 2021-08-28 PROCEDURE — 99282 EMERGENCY DEPT VISIT SF MDM: CPT

## 2021-08-28 PROCEDURE — 25000003 PHARM REV CODE 250: Performed by: EMERGENCY MEDICINE

## 2021-08-28 RX ADMIN — Medication 1 EACH: at 11:08

## 2021-10-07 ENCOUNTER — OFFICE VISIT (OUTPATIENT)
Dept: URGENT CARE | Facility: CLINIC | Age: 46
End: 2021-10-07

## 2021-10-07 VITALS
RESPIRATION RATE: 18 BRPM | OXYGEN SATURATION: 96 % | BODY MASS INDEX: 40.02 KG/M2 | WEIGHT: 302 LBS | DIASTOLIC BLOOD PRESSURE: 98 MMHG | HEIGHT: 73 IN | SYSTOLIC BLOOD PRESSURE: 135 MMHG | TEMPERATURE: 99 F | HEART RATE: 87 BPM

## 2021-10-07 DIAGNOSIS — S76.012A HIP STRAIN, LEFT, INITIAL ENCOUNTER: ICD-10-CM

## 2021-10-07 DIAGNOSIS — S89.92XA INJURY OF LEFT KNEE, INITIAL ENCOUNTER: Primary | ICD-10-CM

## 2021-10-07 DIAGNOSIS — S80.02XA CONTUSION OF LEFT KNEE, INITIAL ENCOUNTER: ICD-10-CM

## 2021-10-07 DIAGNOSIS — S43.52XA SPRAIN OF ACROMIOCLAVICULAR JOINT, LEFT, INITIAL ENCOUNTER: ICD-10-CM

## 2021-10-07 DIAGNOSIS — S49.92XA SHOULDER INJURY, LEFT, INITIAL ENCOUNTER: ICD-10-CM

## 2021-10-07 DIAGNOSIS — S79.912A HIP INJURY, LEFT, INITIAL ENCOUNTER: ICD-10-CM

## 2021-10-07 PROCEDURE — 99214 PR OFFICE/OUTPT VISIT, EST, LEVL IV, 30-39 MIN: ICD-10-PCS | Mod: 25,S$GLB,, | Performed by: INTERNAL MEDICINE

## 2021-10-07 PROCEDURE — 73502 XR HIP WITH PELVIS WHEN PERFORMED, 2 OR 3 VIEWS LEFT: ICD-10-PCS | Mod: FY,TIER,LT,S$GLB | Performed by: RADIOLOGY

## 2021-10-07 PROCEDURE — 73562 XR KNEE 3 VIEW LEFT: ICD-10-PCS | Mod: FY,TIER,LT,S$GLB | Performed by: RADIOLOGY

## 2021-10-07 PROCEDURE — 96372 THER/PROPH/DIAG INJ SC/IM: CPT | Mod: S$GLB,,, | Performed by: INTERNAL MEDICINE

## 2021-10-07 PROCEDURE — 99214 OFFICE O/P EST MOD 30 MIN: CPT | Mod: 25,S$GLB,, | Performed by: INTERNAL MEDICINE

## 2021-10-07 PROCEDURE — 73502 X-RAY EXAM HIP UNI 2-3 VIEWS: CPT | Mod: FY,TIER,LT,S$GLB | Performed by: RADIOLOGY

## 2021-10-07 PROCEDURE — 73030 XR SHOULDER TRAUMA 3 VIEW LEFT: ICD-10-PCS | Mod: FY,TIER,LT,S$GLB | Performed by: RADIOLOGY

## 2021-10-07 PROCEDURE — 96372 PR INJECTION,THERAP/PROPH/DIAG2ST, IM OR SUBCUT: ICD-10-PCS | Mod: S$GLB,,, | Performed by: INTERNAL MEDICINE

## 2021-10-07 PROCEDURE — 73562 X-RAY EXAM OF KNEE 3: CPT | Mod: FY,TIER,LT,S$GLB | Performed by: RADIOLOGY

## 2021-10-07 PROCEDURE — 73030 X-RAY EXAM OF SHOULDER: CPT | Mod: FY,TIER,LT,S$GLB | Performed by: RADIOLOGY

## 2021-10-07 RX ORDER — NAPROXEN 500 MG/1
500 TABLET ORAL 2 TIMES DAILY WITH MEALS
Qty: 20 TABLET | Refills: 0 | Status: SHIPPED | OUTPATIENT
Start: 2021-10-08 | End: 2021-10-18

## 2021-10-07 RX ORDER — KETOROLAC TROMETHAMINE 30 MG/ML
30 INJECTION, SOLUTION INTRAMUSCULAR; INTRAVENOUS
Status: COMPLETED | OUTPATIENT
Start: 2021-10-07 | End: 2021-10-07

## 2021-10-07 RX ADMIN — KETOROLAC TROMETHAMINE 30 MG: 30 INJECTION, SOLUTION INTRAMUSCULAR; INTRAVENOUS at 02:10

## 2022-01-16 ENCOUNTER — PATIENT MESSAGE (OUTPATIENT)
Dept: ADMINISTRATIVE | Facility: OTHER | Age: 47
End: 2022-01-16
Payer: COMMERCIAL

## 2022-01-25 ENCOUNTER — OFFICE VISIT (OUTPATIENT)
Dept: PRIMARY CARE CLINIC | Facility: CLINIC | Age: 47
End: 2022-01-25
Payer: COMMERCIAL

## 2022-01-25 VITALS
WEIGHT: 315 LBS | HEIGHT: 73 IN | SYSTOLIC BLOOD PRESSURE: 132 MMHG | HEART RATE: 83 BPM | RESPIRATION RATE: 17 BRPM | DIASTOLIC BLOOD PRESSURE: 72 MMHG | BODY MASS INDEX: 41.75 KG/M2 | OXYGEN SATURATION: 97 % | TEMPERATURE: 98 F

## 2022-01-25 DIAGNOSIS — Z00.00 ANNUAL PHYSICAL EXAM: Primary | ICD-10-CM

## 2022-01-25 DIAGNOSIS — R06.83 SNORING: ICD-10-CM

## 2022-01-25 PROCEDURE — 1160F RVW MEDS BY RX/DR IN RCRD: CPT | Mod: CPTII,S$GLB,, | Performed by: FAMILY MEDICINE

## 2022-01-25 PROCEDURE — 99999 PR PBB SHADOW E&M-EST. PATIENT-LVL IV: CPT | Mod: PBBFAC,,, | Performed by: FAMILY MEDICINE

## 2022-01-25 PROCEDURE — 1159F PR MEDICATION LIST DOCUMENTED IN MEDICAL RECORD: ICD-10-PCS | Mod: CPTII,S$GLB,, | Performed by: FAMILY MEDICINE

## 2022-01-25 PROCEDURE — 3008F BODY MASS INDEX DOCD: CPT | Mod: CPTII,S$GLB,, | Performed by: FAMILY MEDICINE

## 2022-01-25 PROCEDURE — 3008F PR BODY MASS INDEX (BMI) DOCUMENTED: ICD-10-PCS | Mod: CPTII,S$GLB,, | Performed by: FAMILY MEDICINE

## 2022-01-25 PROCEDURE — 1159F MED LIST DOCD IN RCRD: CPT | Mod: CPTII,S$GLB,, | Performed by: FAMILY MEDICINE

## 2022-01-25 PROCEDURE — 3078F DIAST BP <80 MM HG: CPT | Mod: CPTII,S$GLB,, | Performed by: FAMILY MEDICINE

## 2022-01-25 PROCEDURE — 3075F SYST BP GE 130 - 139MM HG: CPT | Mod: CPTII,S$GLB,, | Performed by: FAMILY MEDICINE

## 2022-01-25 PROCEDURE — 99386 PR PREVENTIVE VISIT,NEW,40-64: ICD-10-PCS | Mod: S$GLB,,, | Performed by: FAMILY MEDICINE

## 2022-01-25 PROCEDURE — 3078F PR MOST RECENT DIASTOLIC BLOOD PRESSURE < 80 MM HG: ICD-10-PCS | Mod: CPTII,S$GLB,, | Performed by: FAMILY MEDICINE

## 2022-01-25 PROCEDURE — 3075F PR MOST RECENT SYSTOLIC BLOOD PRESS GE 130-139MM HG: ICD-10-PCS | Mod: CPTII,S$GLB,, | Performed by: FAMILY MEDICINE

## 2022-01-25 PROCEDURE — 1160F PR REVIEW ALL MEDS BY PRESCRIBER/CLIN PHARMACIST DOCUMENTED: ICD-10-PCS | Mod: CPTII,S$GLB,, | Performed by: FAMILY MEDICINE

## 2022-01-25 PROCEDURE — 99386 PREV VISIT NEW AGE 40-64: CPT | Mod: S$GLB,,, | Performed by: FAMILY MEDICINE

## 2022-01-25 PROCEDURE — 99999 PR PBB SHADOW E&M-EST. PATIENT-LVL IV: ICD-10-PCS | Mod: PBBFAC,,, | Performed by: FAMILY MEDICINE

## 2022-01-25 NOTE — PROGRESS NOTES
[facility] Note    Chief Complaint      Chief Complaint   Patient presents with    Annual Exam       History of Present Illness      Fitz Venegas Jr. is a 46 y.o. male with chronic conditions of HTN, HLD who presents today for:    Screening:   Colon- due at 45   PSA- due at 50    Immunizations:   COVID- boost due feb   Flu- due today   Tetanus - 2017    Activity - AC and refrigeration denia, In NOAC, swims weekly     Diet - egg avocado AM, tuna fruit lunch, fish/chicken dinner. Vegetables daily    No tob  Weekly alcohol      Daytime sleepiness, heavy snoring, witnessed apnea  Hx HTN but normalized off meds, home measurements diastolic in 90s    Patient Care Team:  Amy Salmon MD as PCP - General (Internal Medicine)  Liana Tran LPN as Care Coordinator     Health Maintenance:  Immunization History   Administered Date(s) Administered    COVID-19, MRNA, LN-S, PF (Pfizer) (Purple Cap) 08/04/2021, 08/25/2021    Influenza - Quadrivalent 10/13/2016    Influenza - Quadrivalent - PF *Preferred* (6 months and older) 01/25/2022    Tdap 01/11/2017      Health Maintenance   Topic Date Due    Lipid Panel  05/03/2024    TETANUS VACCINE  01/11/2027    Hepatitis C Screening  Completed        Past Medical History:  Past Medical History:   Diagnosis Date    Obesity (BMI 30-39.9) 4/27/2016    Septic joint of left knee joint 2011       Past Surgical History:   has a past surgical history that includes Knee arthroscopy (Left); Coronary angiography (N/A, 5/3/2019); and Left heart catheterization (Right, 5/3/2019).    Family History:  family history includes Coronary artery disease in his paternal grandfather; Heart attack (age of onset: 53) in his father; Heart disease in his paternal uncle; Hypertension in his maternal uncle.     Social History:  Social History     Tobacco Use    Smoking status: Former Smoker     Types: Cigarettes    Smokeless tobacco: Never Used    Tobacco comment: quit @  "age 32; smoked x 10 years   Substance Use Topics    Alcohol use: Yes     Comment: weekends    Drug use: No       Review of Systems     Medications:  No current outpatient medications on file.     Allergies:  Review of patient's allergies indicates:  No Known Allergies    Physical Exam      Vital Signs  Temp: 97.9 °F (36.6 °C)  Pulse: 83  Resp: 17  SpO2: 97 %  BP: 132/72  BP Location: Left arm  Patient Position: Sitting  Pain Score: 0-No pain  Height and Weight  Height: 6' 1" (185.4 cm)  Weight: (!) 159 kg (350 lb 8.5 oz)  BSA (Calculated - sq m): 2.86 sq meters  BMI (Calculated): 46.3  Weight in (lb) to have BMI = 25: 189.1      Patient Position: Sitting      Physical Exam     Laboratory:  CBC:  Recent Labs   Lab 05/02/19 1108 05/02/19 1108 05/03/19 0149   WBC 6.10   < > 6.50   RBC 5.14   < > 4.78   Hemoglobin 16.3   < > 15.1   Hematocrit 47.0   < > 43.7   Platelets 252   < > 229   MCV 92   < > 91   MCH 31.8 H   < > 31.5 H   MCHC 34.7  --  34.5    < > = values in this interval not displayed.       CMP:  Recent Labs   Lab 05/02/19 1108 05/02/19 1108 05/03/19 0149   Glucose 109   < > 109   Calcium 8.9   < > 8.5 L   Albumin 4.1   < > 3.7   Total Protein 7.5   < > 6.7   Sodium 136   < > 135 L   Potassium 4.2   < > 3.5   CO2 23   < > 22 L   Chloride 105   < > 103   BUN 14   < > 17   Creatinine 0.9   < > 1.0   Alkaline Phosphatase 70   < > 71   ALT 46   < > 39   AST 25   < > 18   Total Bilirubin 0.9  --  0.8    < > = values in this interval not displayed.           URINALYSIS:         LIPIDS:  Recent Labs   Lab 05/03/19 0149   TSH 4.63   HDL 26 L   Cholesterol 154   Triglycerides 275 H   LDL Cholesterol 73   HDL/Cholesterol Ratio 16.9 L   Non-HDL Cholesterol 128   Total Cholesterol/HDL Ratio 5.9 H       TSH:  Recent Labs   Lab 05/03/19 0149   TSH 4.63       A1C:  Recent Labs   Lab 05/03/19 0149   Hemoglobin A1C 5.4       Urine Microalbumin/Cr:          Other:   Recent Labs   Lab 05/03/19  0149   Vit D, " 25-Hydroxy 19 L           Assessment/Plan     Fitz Venegas Jr. is a 46 y.o.male with:    Annual physical exam  -     CBC Auto Differential; Future; Expected date: 01/25/2022  -     Comprehensive Metabolic Panel; Future; Expected date: 01/25/2022  -     Lipid Panel; Future; Expected date: 01/25/2022  -     Hepatitis C Antibody; Future; Expected date: 01/25/2022  -     HIV 1/2 Ag/Ab (4th Gen); Future; Expected date: 01/25/2022  Physical activity, diet, healthy lifestyle, alcohol, tobacco, screenings and immunizations discussed.    Snoring  -     Polysomnogram (CPAP will be added if patient meets diagnostic criteria.); Future     will give shorter followup since he is reporting elevated BP at home. He plans to continue diet and exercise and get CPAP for CASANDRA    Chronic conditions status updated as per HPI.  Other than changes above, cont current medications and maintain follow up with specialists.  Return to clinic in Follow up in about 3 months (around 4/25/2022).      Amy Salmon MD  [facility]

## 2022-01-27 ENCOUNTER — OFFICE VISIT (OUTPATIENT)
Dept: SLEEP MEDICINE | Facility: CLINIC | Age: 47
End: 2022-01-27
Payer: COMMERCIAL

## 2022-01-27 DIAGNOSIS — E78.5 HYPERLIPIDEMIA, UNSPECIFIED HYPERLIPIDEMIA TYPE: Primary | ICD-10-CM

## 2022-01-27 DIAGNOSIS — G47.30 SLEEP APNEA, UNSPECIFIED TYPE: ICD-10-CM

## 2022-01-27 DIAGNOSIS — R06.83 SNORING: ICD-10-CM

## 2022-01-27 DIAGNOSIS — G47.10 HYPERSOMNIA: ICD-10-CM

## 2022-01-27 PROCEDURE — 1159F PR MEDICATION LIST DOCUMENTED IN MEDICAL RECORD: ICD-10-PCS | Mod: CPTII,95,, | Performed by: INTERNAL MEDICINE

## 2022-01-27 PROCEDURE — 1160F PR REVIEW ALL MEDS BY PRESCRIBER/CLIN PHARMACIST DOCUMENTED: ICD-10-PCS | Mod: CPTII,95,, | Performed by: INTERNAL MEDICINE

## 2022-01-27 PROCEDURE — 1159F MED LIST DOCD IN RCRD: CPT | Mod: CPTII,95,, | Performed by: INTERNAL MEDICINE

## 2022-01-27 PROCEDURE — 99202 PR OFFICE/OUTPT VISIT, NEW, LEVL II, 15-29 MIN: ICD-10-PCS | Mod: 95,,, | Performed by: INTERNAL MEDICINE

## 2022-01-27 PROCEDURE — 1160F RVW MEDS BY RX/DR IN RCRD: CPT | Mod: CPTII,95,, | Performed by: INTERNAL MEDICINE

## 2022-01-27 PROCEDURE — 99202 OFFICE O/P NEW SF 15 MIN: CPT | Mod: 95,,, | Performed by: INTERNAL MEDICINE

## 2022-01-27 NOTE — PROGRESS NOTES
Subjective:       Patient ID: Fitz Venegas Jr. is a 46 y.o. male.    Chief Complaint: Sleeping Problem    I had the pleasure of seeing Fitz Venegas Jr. today, who is a 46 y.o. male that presents with observed apnea during sleep.    Fitz Venegas Jr. does not have a CDL.    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    Fitz Venegas Jr. presents with apnea that has been going on for 6-12 months    Bedtime when working ranges from 2100 to 2130.   When not working, bedtime ranges from 2130 to 2400.   Sleep latency ranges from 5 to 10 minutes.     Average number of awakenings is 3-4 and return to sleep is quick.   Wake up time when working is 0700 to 0730.   When not working, wake up time is 0700 to 0900.   Patient does not feel rested upon awakening.    Fitz Venegas Jr. consumes approximately 1 beverages with caffeine daily.   An average of 4 beverages with alcohol are consumed weekly   Medications taken for sleep currently: none  Previous medications taken: none     Fitz Venegas Jr. does experience daytime sleepiness.   Naps are taken about 1 times weekly, usually lasting 30 to 60 minutes.  Fitz currently does operate an automobile.  Fitz Venegas Jr. does not experience drowsiness when driving.   Patient does doze off when sedentary.   Fitz Venegas Jr. does not have auxiliary symptoms of narcolepsy including sleep onset paralysis, hypnagogic hallucinations, sleep attacks and cataplexy    EPWORTH SLEEPINESS SCALE 1/27/2022   Sitting and reading 3   Watching TV 2   Sitting, inactive in a public place (e.g. a theatre or a meeting) 3   As a passenger in a car for an hour without a break 3   Lying down to rest in the afternoon when circumstances permit 3   Sitting and talking to someone 1   Sitting quietly after a lunch without alcohol 3   In a car, while stopped for a few minutes in traffic 2   Total score 20       Fitz Venegas Jr. has a history of snoring.   Snoring is described as severe and  constant.   Apneic episodes have been noticed during sleep.   A witness to sleep is present.   The patient awakens with mouth dryness.      Fitz Venegas Jr. does not have symptoms of Restless Legs Syndrome. Nocturnal leg movements have not been noticed.   The patient does not experience sleep related leg cramps.   There is not a history of parasomnia.    No current outpatient medications on file.     Review of patient's allergies indicates:  No Known Allergies      Past Medical History:   Diagnosis Date    Obesity (BMI 30-39.9) 2016    Septic joint of left knee joint        Past Surgical History:   Procedure Laterality Date    CORONARY ANGIOGRAPHY N/A 5/3/2019    Procedure: ANGIOGRAM, CORONARY ARTERY;  Surgeon: Joey Carter MD;  Location: [facility] CATH LAB;  Service: Cardiology;  Laterality: N/A;    KNEE ARTHROSCOPY Left     LEFT HEART CATHETERIZATION Right 5/3/2019    Procedure: Left heart cath;  Surgeon: Joey Carter MD;  Location: [facility] CATH LAB;  Service: Cardiology;  Laterality: Right;       Family History   Problem Relation Age of Onset    Heart attack Father 53    Heart disease Paternal Uncle         coronary stents    Coronary artery disease Paternal Grandfather         CABG and multiple stents    Hypertension Maternal Uncle        Social History     Socioeconomic History    Marital status: Single   Tobacco Use    Smoking status: Former Smoker     Types: Cigarettes    Smokeless tobacco: Never Used    Tobacco comment: quit @ age 32; smoked x 10 years   Substance and Sexual Activity    Alcohol use: Yes     Comment: weekends    Drug use: No    Sexual activity: Yes     Partners: Female     Birth control/protection: I.U.D.   Social History Narrative    Engaged to Arabella;  daughter Bert    Has 1 daughter from previous marriage           Old medical records.    There were no vitals filed for this visit.           The patient was given open opportunity to ask questions and/or  express concerns about treatment plan.   All questions/concerns were discussed.   Driving precautions were provided.     Two patient identifiers used prior to evaluation.    Thank you for referring Fitz Venegas Jr. for evaluation.             Past Medical History:   Diagnosis Date    Obesity (BMI 30-39.9) 2016    Septic joint of left knee joint      Past Surgical History:   Procedure Laterality Date    CORONARY ANGIOGRAPHY N/A 5/3/2019    Procedure: ANGIOGRAM, CORONARY ARTERY;  Surgeon: Joey Carter MD;  Location: [facility] CATH LAB;  Service: Cardiology;  Laterality: N/A;    KNEE ARTHROSCOPY Left     LEFT HEART CATHETERIZATION Right 5/3/2019    Procedure: Left heart cath;  Surgeon: Joey Carter MD;  Location: [facility] CATH LAB;  Service: Cardiology;  Laterality: Right;     Family History   Problem Relation Age of Onset    Heart attack Father 53    Heart disease Paternal Uncle         coronary stents    Coronary artery disease Paternal Grandfather         CABG and multiple stents    Hypertension Maternal Uncle      Social History     Socioeconomic History    Marital status: Single   Tobacco Use    Smoking status: Former Smoker     Types: Cigarettes    Smokeless tobacco: Never Used    Tobacco comment: quit @ age 32; smoked x 10 years   Substance and Sexual Activity    Alcohol use: Yes     Comment: weekends    Drug use: No    Sexual activity: Yes     Partners: Female     Birth control/protection: I.U.D.   Social History Narrative    Engaged to Arabella;  daughter Bert    Has 1 daughter from previous marriage       No current outpatient medications on file.     No current facility-administered medications for this visit.     Review of patient's allergies indicates:  No Known Allergies    Review of Systems    Objective:      There were no vitals filed for this visit.  Physical Exam    Lab Review:   BMP:   Lab Results   Component Value Date     2019     (L) 2019     K 3.5 05/03/2019     05/03/2019    CO2 22 (L) 05/03/2019    BUN 17 05/03/2019    CREATININE 1.0 05/03/2019    CALCIUM 8.5 (L) 05/03/2019     Diagnostics Review: Echo: Reviewed     Assessment:       1. Hyperlipidemia, unspecified hyperlipidemia type    2. Snoring    3. Sleep apnea, unspecified type    4. Hypersomnia        Plan:       Due to listed symptoms, a polysomnogram is recommended and ordered.   Description of procedure given to patient.   If significant Obstructive Sleep Apnea (CASANDRA) is found during the initial portion of the study, therapy will be initiated with nasal Continuous Positive Airway Pressure (CPAP).   Goals of therapy were discussed, alternative treatments listed and patient agrees to this form of therapy if indicated.   The pathophysiology of CASANDRA was discussed.   The effects of CASANDRA on patient's co-morbid conditions and the increased morbidity and/or mortality associated with this condition were reviewed.   The patient was given open opportunity to ask questions and/or express concerns about treatment plan.   All questions/concerns were discussed.   Driving precautions were provided.       Thank you for referring Fitz Venegas Jr. for evaluation.       The patient location is: home  The chief complaint leading to consultation is: apnea    Visit type: audiovisual    Face to Face time with patient: 14  23 minutes of total time spent on the encounter, which includes face to face time and non-face to face time preparing to see the patient (eg, review of tests), Obtaining and/or reviewing separately obtained history, Documenting clinical information in the electronic or other health record, Independently interpreting results (not separately reported) and communicating results to the patient/family/caregiver, or Care coordination (not separately reported).         Each patient to whom he or she provides medical services by telemedicine is:  (1) informed of the relationship between the physician  and patient and the respective role of any other health care provider with respect to management of the patient; and (2) notified that he or she may decline to receive medical services by telemedicine and may withdraw from such care at any time.    Notes:

## 2022-01-31 ENCOUNTER — TELEPHONE (OUTPATIENT)
Dept: SLEEP MEDICINE | Facility: CLINIC | Age: 47
End: 2022-01-31
Payer: COMMERCIAL

## 2022-01-31 ENCOUNTER — LAB VISIT (OUTPATIENT)
Dept: LAB | Facility: HOSPITAL | Age: 47
End: 2022-01-31
Attending: FAMILY MEDICINE
Payer: COMMERCIAL

## 2022-01-31 ENCOUNTER — TELEPHONE (OUTPATIENT)
Dept: SLEEP MEDICINE | Facility: OTHER | Age: 47
End: 2022-01-31
Payer: COMMERCIAL

## 2022-01-31 DIAGNOSIS — Z00.00 ANNUAL PHYSICAL EXAM: ICD-10-CM

## 2022-01-31 DIAGNOSIS — R73.01 IMPAIRED FASTING GLUCOSE: Primary | ICD-10-CM

## 2022-01-31 LAB
ALBUMIN SERPL BCP-MCNC: 3.8 G/DL (ref 3.5–5.2)
ALP SERPL-CCNC: 85 U/L (ref 55–135)
ALT SERPL W/O P-5'-P-CCNC: 38 U/L (ref 10–44)
ANION GAP SERPL CALC-SCNC: 7 MMOL/L (ref 8–16)
AST SERPL-CCNC: 16 U/L (ref 10–40)
BASOPHILS # BLD AUTO: 0.07 K/UL (ref 0–0.2)
BASOPHILS NFR BLD: 1.1 % (ref 0–1.9)
BILIRUB SERPL-MCNC: 0.8 MG/DL (ref 0.1–1)
BUN SERPL-MCNC: 12 MG/DL (ref 6–20)
CALCIUM SERPL-MCNC: 9.2 MG/DL (ref 8.7–10.5)
CHLORIDE SERPL-SCNC: 106 MMOL/L (ref 95–110)
CHOLEST SERPL-MCNC: 159 MG/DL (ref 120–199)
CHOLEST/HDLC SERPL: 5.1 {RATIO} (ref 2–5)
CO2 SERPL-SCNC: 28 MMOL/L (ref 23–29)
CREAT SERPL-MCNC: 1 MG/DL (ref 0.5–1.4)
DIFFERENTIAL METHOD: ABNORMAL
EOSINOPHIL # BLD AUTO: 0.2 K/UL (ref 0–0.5)
EOSINOPHIL NFR BLD: 2.6 % (ref 0–8)
ERYTHROCYTE [DISTWIDTH] IN BLOOD BY AUTOMATED COUNT: 12.1 % (ref 11.5–14.5)
EST. GFR  (AFRICAN AMERICAN): >60 ML/MIN/1.73 M^2
EST. GFR  (NON AFRICAN AMERICAN): >60 ML/MIN/1.73 M^2
GLUCOSE SERPL-MCNC: 128 MG/DL (ref 70–110)
HCT VFR BLD AUTO: 48.1 % (ref 40–54)
HDLC SERPL-MCNC: 31 MG/DL (ref 40–75)
HDLC SERPL: 19.5 % (ref 20–50)
HGB BLD-MCNC: 16.6 G/DL (ref 14–18)
IMM GRANULOCYTES # BLD AUTO: 0.04 K/UL (ref 0–0.04)
IMM GRANULOCYTES NFR BLD AUTO: 0.6 % (ref 0–0.5)
LDLC SERPL CALC-MCNC: 88.4 MG/DL (ref 63–159)
LYMPHOCYTES # BLD AUTO: 2 K/UL (ref 1–4.8)
LYMPHOCYTES NFR BLD: 30.3 % (ref 18–48)
MCH RBC QN AUTO: 31.8 PG (ref 27–31)
MCHC RBC AUTO-ENTMCNC: 34.5 G/DL (ref 32–36)
MCV RBC AUTO: 92 FL (ref 82–98)
MONOCYTES # BLD AUTO: 0.7 K/UL (ref 0.3–1)
MONOCYTES NFR BLD: 10.6 % (ref 4–15)
NEUTROPHILS # BLD AUTO: 3.5 K/UL (ref 1.8–7.7)
NEUTROPHILS NFR BLD: 54.8 % (ref 38–73)
NONHDLC SERPL-MCNC: 128 MG/DL
NRBC BLD-RTO: 0 /100 WBC
PLATELET # BLD AUTO: 218 K/UL (ref 150–450)
PMV BLD AUTO: 9.4 FL (ref 9.2–12.9)
POTASSIUM SERPL-SCNC: 4.5 MMOL/L (ref 3.5–5.1)
PROT SERPL-MCNC: 7.1 G/DL (ref 6–8.4)
RBC # BLD AUTO: 5.22 M/UL (ref 4.6–6.2)
SODIUM SERPL-SCNC: 141 MMOL/L (ref 136–145)
TRIGL SERPL-MCNC: 198 MG/DL (ref 30–150)
WBC # BLD AUTO: 6.44 K/UL (ref 3.9–12.7)

## 2022-01-31 PROCEDURE — 87389 HIV-1 AG W/HIV-1&-2 AB AG IA: CPT | Performed by: FAMILY MEDICINE

## 2022-01-31 PROCEDURE — 80053 COMPREHEN METABOLIC PANEL: CPT | Performed by: FAMILY MEDICINE

## 2022-01-31 PROCEDURE — 86803 HEPATITIS C AB TEST: CPT | Performed by: FAMILY MEDICINE

## 2022-01-31 PROCEDURE — 80061 LIPID PANEL: CPT | Performed by: FAMILY MEDICINE

## 2022-01-31 PROCEDURE — 85025 COMPLETE CBC W/AUTO DIFF WBC: CPT | Performed by: FAMILY MEDICINE

## 2022-01-31 PROCEDURE — 36415 COLL VENOUS BLD VENIPUNCTURE: CPT | Performed by: FAMILY MEDICINE

## 2022-01-31 NOTE — TELEPHONE ENCOUNTER
Staff reached out to the pt and he did not answer, so I left a message on his vm informing him that the Sleep Lab will contact him in about 2 weeks so schedule. It take about that long to get the PA approved for the study.

## 2022-02-01 ENCOUNTER — PATIENT MESSAGE (OUTPATIENT)
Dept: PRIMARY CARE CLINIC | Facility: CLINIC | Age: 47
End: 2022-02-01
Payer: COMMERCIAL

## 2022-02-01 ENCOUNTER — HOSPITAL ENCOUNTER (OUTPATIENT)
Dept: SLEEP MEDICINE | Facility: OTHER | Age: 47
Discharge: HOME | End: 2022-02-01
Attending: INTERNAL MEDICINE
Payer: COMMERCIAL

## 2022-02-01 DIAGNOSIS — E78.5 HYPERLIPIDEMIA, UNSPECIFIED HYPERLIPIDEMIA TYPE: ICD-10-CM

## 2022-02-01 DIAGNOSIS — G47.33 OSA (OBSTRUCTIVE SLEEP APNEA): Primary | ICD-10-CM

## 2022-02-01 DIAGNOSIS — G47.30 SLEEP APNEA, UNSPECIFIED TYPE: ICD-10-CM

## 2022-02-01 DIAGNOSIS — R06.83 SNORING: ICD-10-CM

## 2022-02-01 DIAGNOSIS — G47.10 HYPERSOMNIA: ICD-10-CM

## 2022-02-01 LAB
HCV AB SERPL QL IA: NEGATIVE
HIV 1+2 AB+HIV1 P24 AG SERPL QL IA: NEGATIVE

## 2022-02-01 PROCEDURE — 95806 PR SLEEP STUDY, UNATTENDED, SIMUL RECORD HR/O2 SAT/RESP FLOW/RESP EFFT: ICD-10-PCS | Mod: 26,,, | Performed by: INTERNAL MEDICINE

## 2022-02-01 PROCEDURE — 95800 SLP STDY UNATTENDED: CPT

## 2022-02-01 PROCEDURE — 95806 SLEEP STUDY UNATT&RESP EFFT: CPT | Mod: 26,,, | Performed by: INTERNAL MEDICINE

## 2022-02-05 DIAGNOSIS — G47.33 OSA (OBSTRUCTIVE SLEEP APNEA): Primary | ICD-10-CM

## 2022-02-05 NOTE — PROCEDURES
"The sleep study that you ordered is complete.  You have ordered sleep LAB services to perform the sleep study for Fitz GOYAL Reecetripplazarus Pereyra.     Please find Sleep Study result in  the "Media tab" of Chart Review menu.     Patient is already established with a Sleep Medicine provider        For any questions, please contact our clinic staff at [phone] to talk to clinical staff.     "

## 2022-02-09 ENCOUNTER — TELEPHONE (OUTPATIENT)
Dept: PRIMARY CARE CLINIC | Facility: CLINIC | Age: 47
End: 2022-02-09
Payer: COMMERCIAL

## 2022-02-09 DIAGNOSIS — G47.33 OSA (OBSTRUCTIVE SLEEP APNEA): Primary | ICD-10-CM

## 2022-03-18 ENCOUNTER — TELEPHONE (OUTPATIENT)
Dept: PRIMARY CARE CLINIC | Facility: CLINIC | Age: 47
End: 2022-03-18
Payer: COMMERCIAL

## 2022-03-19 NOTE — TELEPHONE ENCOUNTER
Pt needs his fasting bloodwork to confirm or disprove diabetes.  Fasting CMP and A1C. Please call him ot confirm they are still ordered and he is going to get them.

## 2022-04-03 ENCOUNTER — PATIENT MESSAGE (OUTPATIENT)
Dept: SLEEP MEDICINE | Facility: CLINIC | Age: 47
End: 2022-04-03
Payer: COMMERCIAL

## 2022-06-15 ENCOUNTER — HOSPITAL ENCOUNTER (EMERGENCY)
Facility: OTHER | Age: 47
Discharge: HOME | End: 2022-06-15
Attending: EMERGENCY MEDICINE
Payer: COMMERCIAL

## 2022-06-15 ENCOUNTER — TELEPHONE (OUTPATIENT)
Dept: PRIMARY CARE CLINIC | Facility: CLINIC | Age: 47
End: 2022-06-15
Payer: COMMERCIAL

## 2022-06-15 VITALS
SYSTOLIC BLOOD PRESSURE: 140 MMHG | RESPIRATION RATE: 20 BRPM | DIASTOLIC BLOOD PRESSURE: 89 MMHG | BODY MASS INDEX: 41.75 KG/M2 | OXYGEN SATURATION: 99 % | HEIGHT: 73 IN | HEART RATE: 85 BPM | WEIGHT: 315 LBS | TEMPERATURE: 99 F

## 2022-06-15 DIAGNOSIS — N20.1 URETEROLITHIASIS: Primary | ICD-10-CM

## 2022-06-15 LAB
ALBUMIN SERPL BCP-MCNC: 3.9 G/DL (ref 3.5–5.2)
ALP SERPL-CCNC: 79 U/L (ref 55–135)
ALT SERPL W/O P-5'-P-CCNC: 36 U/L (ref 10–44)
ANION GAP SERPL CALC-SCNC: 14 MMOL/L (ref 8–16)
AST SERPL-CCNC: 19 U/L (ref 10–40)
BACTERIA #/AREA URNS HPF: ABNORMAL /HPF
BASOPHILS # BLD AUTO: 0.04 K/UL (ref 0–0.2)
BASOPHILS NFR BLD: 0.4 % (ref 0–1.9)
BILIRUB SERPL-MCNC: 1.1 MG/DL (ref 0.1–1)
BILIRUB UR QL STRIP: NEGATIVE
BUN SERPL-MCNC: 20 MG/DL (ref 6–20)
CALCIUM SERPL-MCNC: 9.7 MG/DL (ref 8.7–10.5)
CHLORIDE SERPL-SCNC: 103 MMOL/L (ref 95–110)
CLARITY UR: CLEAR
CO2 SERPL-SCNC: 21 MMOL/L (ref 23–29)
COLOR UR: YELLOW
CREAT SERPL-MCNC: 1.4 MG/DL (ref 0.5–1.4)
DIFFERENTIAL METHOD: ABNORMAL
EOSINOPHIL # BLD AUTO: 0.2 K/UL (ref 0–0.5)
EOSINOPHIL NFR BLD: 1.6 % (ref 0–8)
ERYTHROCYTE [DISTWIDTH] IN BLOOD BY AUTOMATED COUNT: 11.8 % (ref 11.5–14.5)
EST. GFR  (AFRICAN AMERICAN): >60 ML/MIN/1.73 M^2
EST. GFR  (NON AFRICAN AMERICAN): >60 ML/MIN/1.73 M^2
GLUCOSE SERPL-MCNC: 169 MG/DL (ref 70–110)
GLUCOSE UR QL STRIP: NEGATIVE
HCT VFR BLD AUTO: 46.2 % (ref 40–54)
HGB BLD-MCNC: 16.5 G/DL (ref 14–18)
HGB UR QL STRIP: ABNORMAL
IMM GRANULOCYTES # BLD AUTO: 0.04 K/UL (ref 0–0.04)
IMM GRANULOCYTES NFR BLD AUTO: 0.4 % (ref 0–0.5)
KETONES UR QL STRIP: NEGATIVE
LEUKOCYTE ESTERASE UR QL STRIP: NEGATIVE
LYMPHOCYTES # BLD AUTO: 1.5 K/UL (ref 1–4.8)
LYMPHOCYTES NFR BLD: 16.4 % (ref 18–48)
MCH RBC QN AUTO: 32.4 PG (ref 27–31)
MCHC RBC AUTO-ENTMCNC: 35.7 G/DL (ref 32–36)
MCV RBC AUTO: 91 FL (ref 82–98)
MICROSCOPIC COMMENT: ABNORMAL
MONOCYTES # BLD AUTO: 0.7 K/UL (ref 0.3–1)
MONOCYTES NFR BLD: 7 % (ref 4–15)
NEUTROPHILS # BLD AUTO: 6.9 K/UL (ref 1.8–7.7)
NEUTROPHILS NFR BLD: 74.2 % (ref 38–73)
NITRITE UR QL STRIP: NEGATIVE
NRBC BLD-RTO: 0 /100 WBC
PH UR STRIP: 6 [PH] (ref 5–8)
PLATELET # BLD AUTO: 221 K/UL (ref 150–450)
PMV BLD AUTO: 9.6 FL (ref 9.2–12.9)
POTASSIUM SERPL-SCNC: 4 MMOL/L (ref 3.5–5.1)
PROT SERPL-MCNC: 7.3 G/DL (ref 6–8.4)
PROT UR QL STRIP: NEGATIVE
RBC # BLD AUTO: 5.1 M/UL (ref 4.6–6.2)
RBC #/AREA URNS HPF: 15 /HPF (ref 0–4)
SODIUM SERPL-SCNC: 138 MMOL/L (ref 136–145)
SP GR UR STRIP: 1.02 (ref 1–1.03)
URN SPEC COLLECT METH UR: ABNORMAL
UROBILINOGEN UR STRIP-ACNC: NEGATIVE EU/DL
WBC # BLD AUTO: 9.26 K/UL (ref 3.9–12.7)

## 2022-06-15 PROCEDURE — 63600175 PHARM REV CODE 636 W HCPCS: Performed by: EMERGENCY MEDICINE

## 2022-06-15 PROCEDURE — 25000003 PHARM REV CODE 250: Performed by: EMERGENCY MEDICINE

## 2022-06-15 PROCEDURE — 96361 HYDRATE IV INFUSION ADD-ON: CPT

## 2022-06-15 PROCEDURE — 80053 COMPREHEN METABOLIC PANEL: CPT | Performed by: EMERGENCY MEDICINE

## 2022-06-15 PROCEDURE — 99285 EMERGENCY DEPT VISIT HI MDM: CPT | Mod: 25

## 2022-06-15 PROCEDURE — 85025 COMPLETE CBC W/AUTO DIFF WBC: CPT | Performed by: EMERGENCY MEDICINE

## 2022-06-15 PROCEDURE — 96374 THER/PROPH/DIAG INJ IV PUSH: CPT

## 2022-06-15 PROCEDURE — 81000 URINALYSIS NONAUTO W/SCOPE: CPT | Performed by: EMERGENCY MEDICINE

## 2022-06-15 RX ORDER — TAMSULOSIN HYDROCHLORIDE 0.4 MG/1
0.4 CAPSULE ORAL DAILY
Qty: 30 CAPSULE | Refills: 2 | Status: SHIPPED | OUTPATIENT
Start: 2022-06-15 | End: 2022-07-15

## 2022-06-15 RX ORDER — IBUPROFEN 600 MG/1
600 TABLET ORAL EVERY 6 HOURS PRN
Qty: 20 TABLET | Refills: 0 | Status: SHIPPED | OUTPATIENT
Start: 2022-06-15 | End: 2023-01-20

## 2022-06-15 RX ORDER — KETOROLAC TROMETHAMINE 30 MG/ML
15 INJECTION, SOLUTION INTRAMUSCULAR; INTRAVENOUS
Status: COMPLETED | OUTPATIENT
Start: 2022-06-15 | End: 2022-06-15

## 2022-06-15 RX ORDER — TAMSULOSIN HYDROCHLORIDE 0.4 MG/1
0.4 CAPSULE ORAL
Status: COMPLETED | OUTPATIENT
Start: 2022-06-15 | End: 2022-06-15

## 2022-06-15 RX ORDER — OXYCODONE AND ACETAMINOPHEN 5; 325 MG/1; MG/1
1 TABLET ORAL EVERY 4 HOURS PRN
Qty: 18 TABLET | Refills: 0 | Status: SHIPPED | OUTPATIENT
Start: 2022-06-15 | End: 2023-01-12

## 2022-06-15 RX ORDER — ONDANSETRON 4 MG/1
4 TABLET, ORALLY DISINTEGRATING ORAL EVERY 6 HOURS PRN
Qty: 12 TABLET | Refills: 0 | Status: SHIPPED | OUTPATIENT
Start: 2022-06-15 | End: 2023-01-12

## 2022-06-15 RX ORDER — OXYCODONE AND ACETAMINOPHEN 5; 325 MG/1; MG/1
1 TABLET ORAL
Status: COMPLETED | OUTPATIENT
Start: 2022-06-15 | End: 2022-06-15

## 2022-06-15 RX ADMIN — SODIUM CHLORIDE 1000 ML: 0.9 INJECTION, SOLUTION INTRAVENOUS at 12:06

## 2022-06-15 RX ADMIN — KETOROLAC TROMETHAMINE 15 MG: 30 INJECTION, SOLUTION INTRAMUSCULAR at 12:06

## 2022-06-15 RX ADMIN — TAMSULOSIN HYDROCHLORIDE 0.4 MG: 0.4 CAPSULE ORAL at 12:06

## 2022-06-15 RX ADMIN — OXYCODONE HYDROCHLORIDE AND ACETAMINOPHEN 1 TABLET: 5; 325 TABLET ORAL at 12:06

## 2022-06-15 NOTE — DISCHARGE INSTRUCTIONS
Drink plenty of fluids.  A referral was placed with Urology, please follow closely with them and strain your urine in the interim.

## 2022-06-15 NOTE — TELEPHONE ENCOUNTER
----- Message from [place] sent at 6/15/2022  8:12 AM CDT -----  Regarding: advice  Contact: nabil [phone]  [email or web address]     Patient is calling for Medical Advice regarding: Extreme Kidney pains  - cloudy urine    How long has patient had these symptoms: 3 days  - constant since 1:00am    Pharmacy name and phone#:    Would like response via [facility]:  Please call    Comments: Wants call back before going to ED

## 2022-06-15 NOTE — TELEPHONE ENCOUNTER
Pt reports intermittent kidney pain and pain in his testicles over the last 2 weeks. Previously if he took a hot shower/bath and drank water the pain would decrease. Last night at 2 AM the pain became severe and constant. Patient advised to go to the ED for evaluation & treatment.

## 2022-06-15 NOTE — ED PROVIDER NOTES
Encounter Date: 6/15/2022    SCRIBE #1 NOTE: Marissa VELASCO, am scribing for, and in the presence of, Gabrielle Almanza MD.       History     Chief Complaint   Patient presents with    Flank Pain     To ed with c/o intermittent L flank pain for two weeks. C/o nausea and vomiting. Denies painful urination. Reports foul smelling urine. Denies fever.      Time seen by provider: 12:06 PM    This is a 47 y.o. male who presents with a complaint of intermittent left flank pain for the past 2 weeks. Patient states the pain does not radiate. He rates his current pain a 5/10, but notes the pain waxes and wanes. He reports taking Aleve with some relief. Patient further complains of nausea and vomiting this morning. He denies a PMHx of kidney stones. He has no known allergies. He is not taking any daily medications. Patient states he drinks alcohol and smokes cigars occasionally. This is the extent of the patient's complaints at this time.    The history is provided by the patient.     Review of patient's allergies indicates:  No Known Allergies  Past Medical History:   Diagnosis Date    Obesity (BMI 30-39.9) 4/27/2016    Septic joint of left knee joint 2011     Past Surgical History:   Procedure Laterality Date    CORONARY ANGIOGRAPHY N/A 5/3/2019    Procedure: ANGIOGRAM, CORONARY ARTERY;  Surgeon: Joey Carter MD;  Location: [facility] CATH LAB;  Service: Cardiology;  Laterality: N/A;    KNEE ARTHROSCOPY Left     LEFT HEART CATHETERIZATION Right 5/3/2019    Procedure: Left heart cath;  Surgeon: Joey Carter MD;  Location: [facility] CATH LAB;  Service: Cardiology;  Laterality: Right;     Family History   Problem Relation Age of Onset    Heart attack Father 53    Heart disease Paternal Uncle         coronary stents    Coronary artery disease Paternal Grandfather         CABG and multiple stents    Hypertension Maternal Uncle      Social History     Tobacco Use    Smoking status: Former Smoker     Types: Cigarettes     Smokeless tobacco: Never Used    Tobacco comment: quit @ age 32; smoked x 10 years   Substance Use Topics    Alcohol use: Yes     Comment: weekends    Drug use: No     Review of Systems   Constitutional: Negative for chills and fever.   HENT: Negative for congestion and sore throat.    Eyes: Negative for visual disturbance.   Respiratory: Negative for cough and shortness of breath.    Cardiovascular: Negative for chest pain and palpitations.   Gastrointestinal: Positive for nausea and vomiting. Negative for abdominal pain and diarrhea.   Genitourinary: Positive for flank pain. Negative for decreased urine volume, dysuria and frequency.   Musculoskeletal: Negative for joint swelling, neck pain and neck stiffness.   Skin: Negative for rash and wound.   Neurological: Negative for weakness, numbness and headaches.   Psychiatric/Behavioral: Negative for behavioral problems and confusion.       Physical Exam     Initial Vitals [06/15/22 0918]   BP Pulse Resp Temp SpO2   (!) 195/90 76 20 98.6 °F (37 °C) 95 %      MAP       --         Physical Exam    Nursing note and vitals reviewed.  Constitutional: He appears well-developed and well-nourished.   HENT:   Head: Normocephalic and atraumatic.   Nose: Nose normal.   Mouth/Throat: Oropharynx is clear and moist.   Eyes: Conjunctivae and EOM are normal. Pupils are equal, round, and reactive to light.   Neck: Neck supple.   Normal range of motion.  Cardiovascular: Normal rate and regular rhythm. Exam reveals no gallop and no friction rub.    No murmur heard.  Pulmonary/Chest: Breath sounds normal. No respiratory distress. He has no wheezes. He has no rales.   Abdominal: Abdomen is soft. Bowel sounds are normal. There is no abdominal tenderness. There is no rebound and no guarding.   Musculoskeletal:         General: No tenderness or edema.      Cervical back: Normal range of motion and neck supple.     Neurological: He is alert and oriented to person, place, and time. He  has normal strength. No cranial nerve deficit or sensory deficit. Gait normal. GCS score is 15. GCS eye subscore is 4. GCS verbal subscore is 5. GCS motor subscore is 6.   Skin: Skin is warm and dry. No rash noted.   Psychiatric: He has a normal mood and affect. His speech is normal and behavior is normal.         ED Course   Procedures  Labs Reviewed   URINALYSIS, REFLEX TO URINE CULTURE - Abnormal; Notable for the following components:       Result Value    Occult Blood UA 2+ (*)     All other components within normal limits    Narrative:     Specimen Source->Urine   URINALYSIS MICROSCOPIC - Abnormal; Notable for the following components:    RBC, UA 15 (*)     All other components within normal limits    Narrative:     Specimen Source->Urine   CBC W/ AUTO DIFFERENTIAL - Abnormal; Notable for the following components:    MCH 32.4 (*)     Gran % 74.2 (*)     Lymph % 16.4 (*)     All other components within normal limits   COMPREHENSIVE METABOLIC PANEL - Abnormal; Notable for the following components:    CO2 21 (*)     Glucose 169 (*)     Total Bilirubin 1.1 (*)     All other components within normal limits          Imaging Results           CT Renal Stone Study ABD Pelvis WO (Final result)  Result time 06/15/22 13:04:52    Final result by Pratik Jordan MD (06/15/22 13:04:52)                 Impression:      1. Mild left-sided hydroureteronephrosis secondary to a 2-3 mm calculus within the distal left ureter.  2. Hepatomegaly and suspected hepatic steatosis.  3. Colonic diverticulosis.  4. [place] mesentery, nonspecific.  Differential considerations can include sequela of recent or remote infectious or inflammatory process, mesenteric panniculitis or malignancy, among others.  Clinical correlation advised.  5. Additional findings as above.  This report was flagged in Epic as abnormal.  This report was flagged in Epic as containing an incidental finding.      Electronically signed by: Pratik Jordan  MD  Date:    06/15/2022  Time:    13:04             Narrative:    EXAMINATION:  CT RENAL STONE STUDY ABD PELVIS WO    CLINICAL HISTORY:  Flank pain, kidney stone suspected;    TECHNIQUE:  Low dose axial images, sagittal and coronal reformations were obtained from the lung bases to the pubic symphysis.  Contrast was not administered.    COMPARISON:  Left hip series 10/07/2021, chest radiographs 11/17/2019    FINDINGS:  Please note that lack of intravenous contrast limits evaluation of soft tissue and vascular structures.    Imaged lung bases are clear.  Base of the heart is within normal limits.    Liver measures 21 cm in maximum coronal length with diffuse parenchymal hypoattenuation and some scattered areas of geographic peripheral sparing.  No discrete hepatic mass seen.    Gallbladder, pancreas, spleen, stomach, duodenum and bilateral adrenal glands are within normal limits.  No biliary ductal dilatation.  Small accessory splenules anterior to the spleen.    Bilateral kidneys are normal in overall size, shape and location.  There is mild left-sided hydroureteronephrosis secondary to a 2-3 mm calculus within the distal left ureter at the level of the acetabular.  There is minimal right and asymmetric slightly more mild left perinephric stranding.  No radiodense calculus seen within the right collecting system, left kidney or urinary bladder.  No right hydronephrosis.  Right ureter is normal in course and caliber.  Urinary bladder is within normal limits.  Prostate and seminal vesicles are within normal limits noting small dystrophic calcifications in the central gland.  Pelvic phleboliths noted.    No ascites or free air.  Numerous shotty mesenteric and retroperitoneal subcentimeter lymph nodes, but nonenlarged by CT criteria.  There is subtle haziness of the midline and left mesentery of the mid abdomen.    Appendix and terminal ileum are within normal limits.  Mild amount of scattered fecal material throughout  the colon.  Numerous scattered colonic diverticula without acute diverticulitis.  No evidence of bowel obstruction or acute inflammation.  No pneumatosis or portal venous gas.    Osseous structures show dextrocurvature of the lumbar spine, degenerative change/disc disease most prominent at L3-4 through L5-S1 and bilateral L5 remote pars defects with associated minimal grade 1 anterolisthesis of L5 on S1.  No acute or destructive osseous process seen..                                 Medications   ketorolac injection 15 mg (15 mg Intravenous Given 6/15/22 1222)   tamsulosin 24 hr capsule 0.4 mg (0.4 mg Oral Given 6/15/22 1223)   sodium chloride 0.9% bolus 1,000 mL (0 mLs Intravenous Stopped 6/15/22 1337)   oxyCODONE-acetaminophen 5-325 mg per tablet 1 tablet (1 tablet Oral Given 6/15/22 1223)     Medical Decision Making:   History:   Old Medical Records: I decided to obtain old medical records.  Clinical Tests:   Lab Tests: Reviewed and Ordered  Radiological Study: Ordered and Reviewed  ED Management:  47-year-old male presents complaint of intermittent left flank plain times several weeks.  Pain became more constant and more severe yesterday.  Vital signs are benign, afebrile.  On exam he appears nontoxic and well-hydrated, no demonstrable CVA or abdominal tenderness.  He did complain of some ongoing nausea, pain rated 5/10.  He is treated with IV fluids, Flomax, analgesics, antiemetics.  Workup reveals 2-3 mm left distal ureteral kidney stone no evidence of infection.  Creatinine is at baseline.  He is advised to strain all urine, increase hydration, and ambulatory referral for Urology is placed.  He is discharged in good condition with pain resolved, given prescriptions for Flomax, ibuprofen, Percocet, Zofran.          Scribe Attestation:   Scribe #1: I performed the above scribed service and the documentation accurately describes the services I performed. I attest to the accuracy of the note.           Physician  Attestation for Scribe: I, Gabrielle Almanza, reviewed documentation as scribed in my presence, which is both accurate and complete.       Clinical Impression:   Final diagnoses:  [N20.1] Ureterolithiasis (Primary)          ED Disposition Condition    Discharge Stable        ED Prescriptions     Medication Sig Dispense Start Date End Date Auth. Provider    tamsulosin (FLOMAX) 0.4 mg Cap Take 1 capsule (0.4 mg total) by mouth once daily. 30 capsule 6/15/2022 7/15/2022 Gabrielle Almanza MD    ibuprofen (ADVIL,MOTRIN) 600 MG tablet Take 1 tablet (600 mg total) by mouth every 6 (six) hours as needed for Pain. 20 tablet 6/15/2022  Gabrielle Almanza MD    oxyCODONE-acetaminophen (PERCOCET) 5-325 mg per tablet Take 1 tablet by mouth every 4 (four) hours as needed for Pain. 18 tablet 6/15/2022  Gabrielle Almanza MD    ondansetron (ZOFRAN-ODT) 4 MG TbDL Take 1 tablet (4 mg total) by mouth every 6 (six) hours as needed (nausea). 12 tablet 6/15/2022  Gabrielle Almanza MD        Follow-up Information     Follow up With Specialties Details Why Contact Info    Joaquim Mendoza MD Urology Schedule an appointment as soon as possible for a visit  for close follow up [address] [address]  [phone]      [facility] Emergency Dept Emergency Medicine  As needed, If symptoms worsen [address] [facility] [id number] [phone]           Gabrielle Almanza MD  06/15/22 1098

## 2022-06-22 ENCOUNTER — OFFICE VISIT (OUTPATIENT)
Dept: UROLOGY | Facility: CLINIC | Age: 47
End: 2022-06-22
Attending: EMERGENCY MEDICINE
Payer: COMMERCIAL

## 2022-06-22 VITALS — SYSTOLIC BLOOD PRESSURE: 129 MMHG | HEART RATE: 77 BPM | DIASTOLIC BLOOD PRESSURE: 76 MMHG

## 2022-06-22 DIAGNOSIS — R35.0 URINARY FREQUENCY: Primary | ICD-10-CM

## 2022-06-22 DIAGNOSIS — R10.9 FLANK PAIN: ICD-10-CM

## 2022-06-22 DIAGNOSIS — N13.2 URETERAL STONE WITH HYDRONEPHROSIS: ICD-10-CM

## 2022-06-22 LAB
BILIRUB SERPL-MCNC: POSITIVE MG/DL
BLOOD URINE, POC: POSITIVE
CLARITY, POC UA: CLEAR
COLOR, POC UA: YELLOW
GLUCOSE UR QL STRIP: NEGATIVE
KETONES UR QL STRIP: POSITIVE
LEUKOCYTE ESTERASE URINE, POC: NEGATIVE
NITRITE, POC UA: NEGATIVE
PH, POC UA: 5
PROTEIN, POC: POSITIVE
SPECIFIC GRAVITY, POC UA: 1.02
UROBILINOGEN, POC UA: POSITIVE

## 2022-06-22 PROCEDURE — 81002 URINALYSIS NONAUTO W/O SCOPE: CPT | Mod: S$GLB,,, | Performed by: NURSE PRACTITIONER

## 2022-06-22 PROCEDURE — 1159F PR MEDICATION LIST DOCUMENTED IN MEDICAL RECORD: ICD-10-PCS | Mod: CPTII,S$GLB,, | Performed by: NURSE PRACTITIONER

## 2022-06-22 PROCEDURE — 99204 OFFICE O/P NEW MOD 45 MIN: CPT | Mod: S$GLB,,, | Performed by: NURSE PRACTITIONER

## 2022-06-22 PROCEDURE — 1159F MED LIST DOCD IN RCRD: CPT | Mod: CPTII,S$GLB,, | Performed by: NURSE PRACTITIONER

## 2022-06-22 PROCEDURE — 81002 POCT URINE DIPSTICK WITHOUT MICROSCOPE: ICD-10-PCS | Mod: S$GLB,,, | Performed by: NURSE PRACTITIONER

## 2022-06-22 PROCEDURE — 87086 URINE CULTURE/COLONY COUNT: CPT | Performed by: NURSE PRACTITIONER

## 2022-06-22 PROCEDURE — 3078F PR MOST RECENT DIASTOLIC BLOOD PRESSURE < 80 MM HG: ICD-10-PCS | Mod: CPTII,S$GLB,, | Performed by: NURSE PRACTITIONER

## 2022-06-22 PROCEDURE — 3074F PR MOST RECENT SYSTOLIC BLOOD PRESSURE < 130 MM HG: ICD-10-PCS | Mod: CPTII,S$GLB,, | Performed by: NURSE PRACTITIONER

## 2022-06-22 PROCEDURE — 3078F DIAST BP <80 MM HG: CPT | Mod: CPTII,S$GLB,, | Performed by: NURSE PRACTITIONER

## 2022-06-22 PROCEDURE — 99204 PR OFFICE/OUTPT VISIT, NEW, LEVL IV, 45-59 MIN: ICD-10-PCS | Mod: S$GLB,,, | Performed by: NURSE PRACTITIONER

## 2022-06-22 PROCEDURE — 1160F RVW MEDS BY RX/DR IN RCRD: CPT | Mod: CPTII,S$GLB,, | Performed by: NURSE PRACTITIONER

## 2022-06-22 PROCEDURE — 3074F SYST BP LT 130 MM HG: CPT | Mod: CPTII,S$GLB,, | Performed by: NURSE PRACTITIONER

## 2022-06-22 PROCEDURE — 1160F PR REVIEW ALL MEDS BY PRESCRIBER/CLIN PHARMACIST DOCUMENTED: ICD-10-PCS | Mod: CPTII,S$GLB,, | Performed by: NURSE PRACTITIONER

## 2022-06-22 RX ORDER — KETOROLAC TROMETHAMINE 10 MG/1
10 TABLET, FILM COATED ORAL EVERY 6 HOURS
Qty: 20 TABLET | Refills: 0 | Status: SHIPPED | OUTPATIENT
Start: 2022-06-22 | End: 2022-06-27

## 2022-06-22 RX ORDER — HYDROCODONE BITARTRATE AND ACETAMINOPHEN 7.5; 325 MG/1; MG/1
1 TABLET ORAL EVERY 6 HOURS PRN
Qty: 12 TABLET | Refills: 0 | Status: SHIPPED | OUTPATIENT
Start: 2022-06-22 | End: 2023-01-12

## 2022-06-22 NOTE — PROGRESS NOTES
"Subjective:      Fitz Venegas Jr. is a 47 y.o. male who was referred by Dr. Almanza for evaluation of his nephrolithiasis.      The patient presented to the ED on 6/15 with intermittent left flank pain x 2 weeks and N/V.     CT stone study demonstrated "mild left-sided hydroureteronephrosis secondary to a 2-3 mm calculus within the distal left ureter at the level of the acetabular.  There is minimal right and asymmetric slightly more mild left perinephric stranding.  No radiodense calculus seen within the right collecting system, left kidney or urinary bladder.  No right hydronephrosis.  Right ureter is normal in course and caliber.  Urinary bladder is within normal limits.  Prostate and seminal vesicles are within normal limits noting small dystrophic calcifications in the central gland.  Pelvic phleboliths noted." UA negative for infection and creatinine at baseline. He was given rx for Flomax, ibuprofen, Percocet, and Zofran.     He presents today reporting continued left flank pain with radiation to his scrotum/groin. States that the pain has been intermittent but much more severe today. Also with urinary frequency and nausea. States that the percocet is only mildly alleviating the pain. Denies dysuria, hematuria and fever/chills.  Remains on Flomax.     The following portions of the patient's history were reviewed and updated as appropriate: allergies, current medications, past family history, past medical history, past social history, past surgical history and problem list.    Review of Systems  Constitutional: no fever or chills  ENT: no nasal congestion or sore throat  Respiratory: no cough or shortness of breath  Cardiovascular: no chest pain or palpitations  Gastrointestinal: no nausea or vomiting, tolerating diet  Genitourinary: as per HPI  Hematologic/Lymphatic: no easy bruising or lymphadenopathy  Musculoskeletal: no arthralgias or myalgias  Neurological: no seizures or tremors  Behavioral/Psych: no " "auditory or visual hallucinations     Objective:   Vitals:   Vitals:    06/22/22 1335   BP: 129/76   Pulse: 77       Physical Exam   General: alert and oriented, no acute distress  Head: normocephalic, atraumatic  Neck: supple, normal ROM  Respiratory: Symmetric expansion, non-labored breathing  Cardiovascular: regular rate and rhythm  Abdomen: soft, non tender, non distended  Genitourinary: deferred  Skin: normal coloration and turgor, no rashes, no suspicious skin lesions noted  Neuro: alert and oriented x3, no gross deficits  Psych: normal judgment and insight, normal mood/affect and non-anxious  + left CVA tenderness    Lab Review   Urinalysis demonstrates positive for red blood cells  Lab Results   Component Value Date    WBC 9.26 06/15/2022    HGB 16.5 06/15/2022    HCT 46.2 06/15/2022    MCV 91 06/15/2022     06/15/2022     Lab Results   Component Value Date    CREATININE 1.4 06/15/2022    BUN 20 06/15/2022     No results found for: PSA  Imaging   (all images personally reviewed; agree with report below)  CT stone study- "CT stone study demonstrated "mild left-sided hydroureteronephrosis secondary to a 2-3 mm calculus within the distal left ureter at the level of the acetabular.  There is minimal right and asymmetric slightly more mild left perinephric stranding.  No radiodense calculus seen within the right collecting system, left kidney or urinary bladder.  No right hydronephrosis.  Right ureter is normal in course and caliber.  Urinary bladder is within normal limits.  Prostate and seminal vesicles are within normal limits noting small dystrophic calcifications in the central gland.  Pelvic phleboliths noted." "    Assessment:   Urinary frequency  Ureteral stone with hydronephrosis  Flank pain    Plan:   Fitz was seen today for nephrolithiasis.    Diagnoses and all orders for this visit:    Urinary frequency    Ureteral stone with hydronephrosis  -     Ambulatory referral/consult to Urology  -     " POCT URINE DIPSTICK WITHOUT MICROSCOPE  -     ketorolac (TORADOL) 10 mg tablet; Take 1 tablet (10 mg total) by mouth every 6 (six) hours. for 5 days  -     HYDROcodone-acetaminophen (NORCO) 7.5-325 mg per tablet; Take 1 tablet by mouth every 6 (six) hours as needed for Pain.  -     US Retroperitoneal Complete (Kidney and; Future  -     Urine culture    Flank pain    Plan:  --Advised of the options of watch and wait vs intervention ureteroscopic approach vs ESWL. Will proceed with trial of passage given the size and location of the stone  --Strain all urine.  --Encourage fluids.  --Flomax  --Toradol PRN, norco for breakthrough pain  --Recommend general preventive measures, to include increased hydration, low Na diet, and increased citrus intake  --Discussed indications to present to ED, including fever, intractable pain, and intractable nausea/vomitting  --LEYDI in 2 weeks of the stone has not been collected

## 2022-06-23 LAB — BACTERIA UR CULT: NO GROWTH

## 2022-06-28 ENCOUNTER — HOSPITAL ENCOUNTER (OUTPATIENT)
Dept: RADIOLOGY | Facility: OTHER | Age: 47
Discharge: HOME | End: 2022-06-28
Attending: NURSE PRACTITIONER
Payer: COMMERCIAL

## 2022-06-28 DIAGNOSIS — N13.2 URETERAL STONE WITH HYDRONEPHROSIS: ICD-10-CM

## 2022-06-28 PROCEDURE — 76770 US RETROPERITONEAL COMPLETE: ICD-10-PCS | Mod: 26,,, | Performed by: RADIOLOGY

## 2022-06-28 PROCEDURE — 76770 US EXAM ABDO BACK WALL COMP: CPT | Mod: 26,,, | Performed by: RADIOLOGY

## 2022-06-28 PROCEDURE — 76770 US EXAM ABDO BACK WALL COMP: CPT | Mod: TC

## 2022-06-29 NOTE — PROGRESS NOTES
"Subjective:      Fitz Venegas Jr. is a 47 y.o. male who returns today regarding his nephrolithiasis.    The patient presented to the ED on 6/15 with intermittent left flank pain and N/V x 2 weeks.      CT stone study demonstrated "mild left-sided hydroureteronephrosis secondary to a 2-3 mm calculus within the distal left ureter at the level of the acetabular.  There is minimal right and asymmetric slightly more mild left perinephric stranding.  No radiodense calculus seen within the right collecting system, left kidney or urinary bladder.  No right hydronephrosis.  Right ureter is normal in course and caliber.  Urinary bladder is within normal limits.  Prostate and seminal vesicles are within normal limits noting small dystrophic calcifications in the central gland.  Pelvic phleboliths noted." UA negative for infection and creatinine at baseline. He was given rx for Flomax, ibuprofen, Percocet, and Zofran.     Returns today with LEYDI that demonstrates "Layering stone within the bladder, likely passed previously seen left ureteral stone.  No hydronephrosis."    He has not yet collected the stone but has not always been able to consistently strain his urine. Reports frequency and urgency at times. Occasional left flank pain but overall feeling much better. Denies fever/chills.     The following portions of the patient's history were reviewed and updated as appropriate: allergies, current medications, past family history, past medical history, past social history, past surgical history and problem list.    Review of Systems  Constitutional: no fever or chills  ENT: no nasal congestion or sore throat  Respiratory: no cough or shortness of breath  Cardiovascular: no chest pain or palpitations  Gastrointestinal: no nausea or vomiting, tolerating diet  Genitourinary: as per HPI  Hematologic/Lymphatic: no easy bruising or lymphadenopathy  Musculoskeletal: no arthralgias or myalgias  Neurological: no seizures or " "tremors  Behavioral/Psych: no auditory or visual hallucinations     Objective:   Vitals: .  Vitals:    06/30/22 0814   BP: 111/62   Pulse: 69     Physical Exam   General: alert and oriented, no acute distress  Head: normocephalic, atraumatic  Neck: supple, normal ROM  Respiratory: Symmetric expansion, non-labored breathing  Cardiovascular: regular rate and rhythm   Abdomen: soft, non tender, non distended  Genitourinary: deferred  Skin: normal coloration and turgor, no rashes, no suspicious skin lesions noted  Neuro: alert and oriented x3, no gross deficits  Psych: normal judgment and insight, normal mood/affect and non-anxious    Lab Review   Urinalysis demonstrates positive for red blood cells (trace)  Lab Results   Component Value Date    WBC 9.26 06/15/2022    HGB 16.5 06/15/2022    HCT 46.2 06/15/2022    MCV 91 06/15/2022     06/15/2022     Lab Results   Component Value Date    CREATININE 1.4 06/15/2022    BUN 20 06/15/2022     No results found for: PSA  Imaging  (all images personally reviewed; agree with report below)  LEYDI- "Right kidney: The right kidney measures 12.0 cm. No cortical thinning. No loss of corticomedullary distinction. Resistive index measures 0.68.  No renal stone. No hydronephrosis.  Left kidney: The left kidney measures 12.1 cm. No cortical thinning. No loss of corticomedullary distinction. Resistive index measures 0.69.  No renal stone. No hydronephrosis.  The bladder is partially distended at the time of scanning.  Layering 8 mm echogenicity."    Assessment:   Nephrolithiasis     Plan:   Fitz was seen today for follow-up.    Diagnoses and all orders for this visit:    Nephrolithiasis  -     tamsulosin (FLOMAX) 0.4 mg Cap; Take 1 capsule (0.4 mg total) by mouth once daily.    Plan:  --UA negative for infection   --The stone appears to be in the bladder on US. [place] has resolved  --Flush fluids, strain urine and continue Flomax  --Stone analysis when the stone is collected   "

## 2022-06-30 ENCOUNTER — OFFICE VISIT (OUTPATIENT)
Dept: UROLOGY | Facility: CLINIC | Age: 47
End: 2022-06-30
Payer: COMMERCIAL

## 2022-06-30 VITALS — SYSTOLIC BLOOD PRESSURE: 111 MMHG | DIASTOLIC BLOOD PRESSURE: 62 MMHG | HEART RATE: 69 BPM

## 2022-06-30 DIAGNOSIS — N20.0 NEPHROLITHIASIS: Primary | ICD-10-CM

## 2022-06-30 PROCEDURE — 3074F PR MOST RECENT SYSTOLIC BLOOD PRESSURE < 130 MM HG: ICD-10-PCS | Mod: CPTII,S$GLB,, | Performed by: NURSE PRACTITIONER

## 2022-06-30 PROCEDURE — 99213 PR OFFICE/OUTPT VISIT, EST, LEVL III, 20-29 MIN: ICD-10-PCS | Mod: S$GLB,,, | Performed by: NURSE PRACTITIONER

## 2022-06-30 PROCEDURE — 1159F PR MEDICATION LIST DOCUMENTED IN MEDICAL RECORD: ICD-10-PCS | Mod: CPTII,S$GLB,, | Performed by: NURSE PRACTITIONER

## 2022-06-30 PROCEDURE — 99213 OFFICE O/P EST LOW 20 MIN: CPT | Mod: S$GLB,,, | Performed by: NURSE PRACTITIONER

## 2022-06-30 PROCEDURE — 3074F SYST BP LT 130 MM HG: CPT | Mod: CPTII,S$GLB,, | Performed by: NURSE PRACTITIONER

## 2022-06-30 PROCEDURE — 3078F PR MOST RECENT DIASTOLIC BLOOD PRESSURE < 80 MM HG: ICD-10-PCS | Mod: CPTII,S$GLB,, | Performed by: NURSE PRACTITIONER

## 2022-06-30 PROCEDURE — 1159F MED LIST DOCD IN RCRD: CPT | Mod: CPTII,S$GLB,, | Performed by: NURSE PRACTITIONER

## 2022-06-30 PROCEDURE — 1160F RVW MEDS BY RX/DR IN RCRD: CPT | Mod: CPTII,S$GLB,, | Performed by: NURSE PRACTITIONER

## 2022-06-30 PROCEDURE — 3078F DIAST BP <80 MM HG: CPT | Mod: CPTII,S$GLB,, | Performed by: NURSE PRACTITIONER

## 2022-06-30 PROCEDURE — 1160F PR REVIEW ALL MEDS BY PRESCRIBER/CLIN PHARMACIST DOCUMENTED: ICD-10-PCS | Mod: CPTII,S$GLB,, | Performed by: NURSE PRACTITIONER

## 2022-06-30 RX ORDER — TAMSULOSIN HYDROCHLORIDE 0.4 MG/1
0.4 CAPSULE ORAL DAILY
Qty: 30 CAPSULE | Refills: 11 | Status: SHIPPED | OUTPATIENT
Start: 2022-06-30 | End: 2023-01-12

## 2022-07-14 ENCOUNTER — PATIENT MESSAGE (OUTPATIENT)
Dept: UROLOGY | Facility: CLINIC | Age: 47
End: 2022-07-14
Payer: COMMERCIAL

## 2022-11-17 ENCOUNTER — OFFICE VISIT (OUTPATIENT)
Dept: URGENT CARE | Facility: CLINIC | Age: 47
End: 2022-11-17
Payer: COMMERCIAL

## 2022-11-17 VITALS
WEIGHT: 300 LBS | HEART RATE: 100 BPM | TEMPERATURE: 99 F | SYSTOLIC BLOOD PRESSURE: 155 MMHG | BODY MASS INDEX: 39.76 KG/M2 | HEIGHT: 73 IN | RESPIRATION RATE: 20 BRPM | DIASTOLIC BLOOD PRESSURE: 86 MMHG | OXYGEN SATURATION: 95 %

## 2022-11-17 DIAGNOSIS — J02.0 STREP PHARYNGITIS: Primary | ICD-10-CM

## 2022-11-17 DIAGNOSIS — R50.9 FEVER, UNSPECIFIED FEVER CAUSE: ICD-10-CM

## 2022-11-17 LAB
CTP QC/QA: YES
MOLECULAR STREP A: POSITIVE
POC MOLECULAR INFLUENZA A AGN: NEGATIVE
POC MOLECULAR INFLUENZA B AGN: NEGATIVE
SARS-COV-2 AG RESP QL IA.RAPID: NEGATIVE

## 2022-11-17 PROCEDURE — 3077F SYST BP >= 140 MM HG: CPT | Mod: CPTII,S$GLB,, | Performed by: NURSE PRACTITIONER

## 2022-11-17 PROCEDURE — 1160F PR REVIEW ALL MEDS BY PRESCRIBER/CLIN PHARMACIST DOCUMENTED: ICD-10-PCS | Mod: CPTII,S$GLB,, | Performed by: NURSE PRACTITIONER

## 2022-11-17 PROCEDURE — 1160F RVW MEDS BY RX/DR IN RCRD: CPT | Mod: CPTII,S$GLB,, | Performed by: NURSE PRACTITIONER

## 2022-11-17 PROCEDURE — 1159F MED LIST DOCD IN RCRD: CPT | Mod: CPTII,S$GLB,, | Performed by: NURSE PRACTITIONER

## 2022-11-17 PROCEDURE — 3008F PR BODY MASS INDEX (BMI) DOCUMENTED: ICD-10-PCS | Mod: CPTII,S$GLB,, | Performed by: NURSE PRACTITIONER

## 2022-11-17 PROCEDURE — 87811 SARS-COV-2 COVID19 W/OPTIC: CPT | Mod: QW,S$GLB,, | Performed by: NURSE PRACTITIONER

## 2022-11-17 PROCEDURE — 87502 POCT INFLUENZA A/B MOLECULAR: ICD-10-PCS | Mod: QW,S$GLB,, | Performed by: NURSE PRACTITIONER

## 2022-11-17 PROCEDURE — 87502 INFLUENZA DNA AMP PROBE: CPT | Mod: QW,S$GLB,, | Performed by: NURSE PRACTITIONER

## 2022-11-17 PROCEDURE — 1159F PR MEDICATION LIST DOCUMENTED IN MEDICAL RECORD: ICD-10-PCS | Mod: CPTII,S$GLB,, | Performed by: NURSE PRACTITIONER

## 2022-11-17 PROCEDURE — 3008F BODY MASS INDEX DOCD: CPT | Mod: CPTII,S$GLB,, | Performed by: NURSE PRACTITIONER

## 2022-11-17 PROCEDURE — 87651 POCT STREP A MOLECULAR: ICD-10-PCS | Mod: QW,S$GLB,, | Performed by: NURSE PRACTITIONER

## 2022-11-17 PROCEDURE — 99213 OFFICE O/P EST LOW 20 MIN: CPT | Mod: S$GLB,,, | Performed by: NURSE PRACTITIONER

## 2022-11-17 PROCEDURE — 87651 STREP A DNA AMP PROBE: CPT | Mod: QW,S$GLB,, | Performed by: NURSE PRACTITIONER

## 2022-11-17 PROCEDURE — 3079F PR MOST RECENT DIASTOLIC BLOOD PRESSURE 80-89 MM HG: ICD-10-PCS | Mod: CPTII,S$GLB,, | Performed by: NURSE PRACTITIONER

## 2022-11-17 PROCEDURE — 87811 SARS CORONAVIRUS 2 ANTIGEN POCT, MANUAL READ: ICD-10-PCS | Mod: QW,S$GLB,, | Performed by: NURSE PRACTITIONER

## 2022-11-17 PROCEDURE — 3077F PR MOST RECENT SYSTOLIC BLOOD PRESSURE >= 140 MM HG: ICD-10-PCS | Mod: CPTII,S$GLB,, | Performed by: NURSE PRACTITIONER

## 2022-11-17 PROCEDURE — 99213 PR OFFICE/OUTPT VISIT, EST, LEVL III, 20-29 MIN: ICD-10-PCS | Mod: S$GLB,,, | Performed by: NURSE PRACTITIONER

## 2022-11-17 PROCEDURE — 3079F DIAST BP 80-89 MM HG: CPT | Mod: CPTII,S$GLB,, | Performed by: NURSE PRACTITIONER

## 2022-11-17 RX ORDER — AMOXICILLIN 500 MG/1
500 TABLET, FILM COATED ORAL EVERY 12 HOURS
Qty: 20 TABLET | Refills: 0 | Status: SHIPPED | OUTPATIENT
Start: 2022-11-17 | End: 2022-11-27

## 2022-11-17 NOTE — PATIENT INSTRUCTIONS
See additional patient Instructions provided    - Rest.    - Drink plenty of fluids.  - Viral upper respiratory infections typically run their course in 10-14 days.     - Tylenol or Ibuprofen as directed as needed for fever/pain. Avoid tylenol if you have a history of liver disease. Do not take ibuprofen if you have a history of GI bleeding, kidney disease, or if you take blood thinners.     - You can take over-the-counter claritin, zyrtec, allegra, or xyzal as directed. These are antihistamines that can help with runny nose, nasal congestion, sneezing, and helps to dry up post-nasal drip, which usually causes sore throat and cough.   - If you do NOT have high blood pressure, you may use a decongestant form (D)  of this medication (ie. Claritin- D, zyrtec-D, allegra-D) or if you do not take the D form, you can take sudafed (pseudoephedrine) over the counter, which is a decongestant. Do NOT take two decongestant (D) medications at the same time (such as mucinex-D and claritin-D or plain sudafed and claritin D)    - You can use Flonase (fluticasone) nasal spray as directed for sinus congestion and postnasal drip. This is a steroid nasal spray that works locally over time to decrease the inflammation in your nose/sinuses and help with allergic symptoms. This is not an quick- relief spray like afrin, but it works well if used daily.  Discontinue if you develop nose bleed  - use nasal saline prior to Flonase.  - Use Ocean Spray Nasal Saline 1-3 puffs each nostril every 2-3 hours then blow out onto tissue. This is to irrigate the nasal passage way to clear the sinus openings. Use until sinus problem resolved.    - you can take plain Mucinex (guaifenesin) 1200 mg twice a day to help loosen mucous.     -warm salt water gargles can help with sore throat    - warm tea with honey can help with cough. Honey is a natural cough suppressant.    - Dextromethorphan (DM) is a cough suppressant over the counter (ie. mucinex DM,  robitussin, delsym; dayquil/nyquil has DM as well.)    - Follow up with your PCP or specialty clinic as directed in the next 1-2 weeks if not improved or as needed.  You can call [phone] to schedule an appointment with the appropriate provider.      - Go to the ER if you develop new or worsening symptoms.     - You must understand that you have received an Urgent Care treatment only and that you may be released before all of your medical problems are known or treated.   - You, the patient, will arrange for follow up care as instructed.   - If your condition worsens or fails to improve we recommend that you receive another evaluation at the ER immediately or contact your PCP to discuss your concerns or return here.     Patient Instructions   - You must understand that you have received an Urgent Care treatment only and that you may be released before all of your medical problems are known or treated.   - You, the patient, will arrange for follow up care as instructed.   - If your condition worsens or fails to improve we recommend that you receive another evaluation at the ER immediately or contact your PCP to discuss your concerns or return here.     Advised on return/follow-up precautions. Advised on ER precautions. Answered all patient questions. Patient verbalized understanding and voiced agreement with current treatment plan.

## 2022-11-17 NOTE — PROGRESS NOTES
"Subjective:       Patient ID: Fitz Venegas Jr. is a 47 y.o. male.    Vitals:  height is 6' 1" (1.854 m) and weight is 136.1 kg (300 lb). His oral temperature is 98.9 °F (37.2 °C). His blood pressure is 155/86 (abnormal) and his pulse is 100. His respiration is 20 and oxygen saturation is 95%.     Chief Complaint: Fever    This is a 47 y.o. male who presents today with a chief complaint of fever , sore throat, body aches, cold sweats. Patient says that he had a fever of 104 last night and took ibuprofen. This morning fever was 105.4 this morning. Patient took 600 mg of ibprofen.       Fever   This is a new problem. The current episode started yesterday. The problem occurs constantly. The problem has been gradually worsening. The maximum temperature noted was more than 104 F. Associated symptoms include a sore throat. Pertinent negatives include no abdominal pain, chest pain, congestion, coughing, diarrhea, ear pain, headaches, nausea, rash, vomiting or wheezing. He has tried NSAIDs for the symptoms. The treatment provided no relief.   Constitution: Positive for sweating and fever. Negative for chills and fatigue.   HENT:  Positive for sore throat. Negative for ear pain, ear discharge, congestion, postnasal drip, sinus pain, sinus pressure, trouble swallowing and voice change.    Neck: Negative for neck pain and painful lymph nodes.   Cardiovascular:  Negative for chest pain, palpitations and sob on exertion.   Respiratory:  Negative for chest tightness, cough, sputum production, shortness of breath and wheezing.    Gastrointestinal:  Negative for abdominal pain, nausea, vomiting and diarrhea.   Musculoskeletal:  Positive for muscle ache.   Skin:  Negative for color change, pale and rash.   Allergic/Immunologic: Negative for sneezing.   Neurological:  Negative for headaches.   Hematologic/Lymphatic: Negative for swollen lymph nodes.     Objective:      Physical Exam   Constitutional: He is oriented to person, " place, and time. He appears well-developed. He is cooperative.  Non-toxic appearance. He does not appear ill. No distress.   HENT:   Head: Normocephalic and atraumatic.   Ears:   Right Ear: Hearing, tympanic membrane, external ear and ear canal normal.   Left Ear: Hearing, tympanic membrane, external ear and ear canal normal.   Nose: Nose normal. No mucosal edema, rhinorrhea, nasal deformity or congestion. No epistaxis. Right sinus exhibits no maxillary sinus tenderness and no frontal sinus tenderness. Left sinus exhibits no maxillary sinus tenderness and no frontal sinus tenderness.   Mouth/Throat: Uvula is midline and mucous membranes are normal. No trismus in the jaw. Normal dentition. No uvula swelling. Posterior oropharyngeal erythema present. No oropharyngeal exudate or posterior oropharyngeal edema.   Eyes: Conjunctivae and lids are normal. No scleral icterus.   Neck: Trachea normal and phonation normal. Neck supple.      Comments: Lymphadenopathy R > L No edema present. No erythema present. No neck rigidity present.   Cardiovascular: Normal rate, regular rhythm and normal heart sounds.   Pulmonary/Chest: Effort normal and breath sounds normal. No stridor. No respiratory distress. He has no decreased breath sounds. He has no wheezes. He has no rhonchi. He has no rales. He exhibits no tenderness.   Abdominal: Normal appearance.   Musculoskeletal: Normal range of motion.         General: No deformity. Normal range of motion.   Lymphadenopathy:     He has cervical adenopathy.   Neurological: He is alert and oriented to person, place, and time. He exhibits normal muscle tone. Coordination normal.   Skin: Skin is warm, dry, intact, not diaphoretic and not pale.   Psychiatric: His speech is normal and behavior is normal. Judgment and thought content normal.   Nursing note and vitals reviewed.      Results for orders placed or performed in visit on 11/17/22   POCT Influenza A/B MOLECULAR   Result Value Ref Range     POC Molecular Influenza A Ag Negative Negative, Not Reported    POC Molecular Influenza B Ag Negative Negative, Not Reported     Acceptable Yes    POCT Strep A, Molecular   Result Value Ref Range    Molecular Strep A, POC Positive (A) Negative     Acceptable Yes    SARS Coronavirus 2 Antigen, POCT Manual Read   Result Value Ref Range    SARS Coronavirus 2 Antigen Negative Negative     Acceptable Yes        Assessment:       1. Strep pharyngitis    2. Fever, unspecified fever cause          Plan:         Strep pharyngitis  -     amoxicillin (AMOXIL) 500 MG Tab; Take 1 tablet (500 mg total) by mouth every 12 (twelve) hours. for 10 days  Dispense: 20 tablet; Refill: 0    Fever, unspecified fever cause  -     POCT Influenza A/B MOLECULAR  -     POCT Strep A, Molecular  -     SARS Coronavirus 2 Antigen, POCT Manual Read               Patient Instructions   See additional patient Instructions provided    - Rest.    - Drink plenty of fluids.  - Viral upper respiratory infections typically run their course in 10-14 days.     - Tylenol or Ibuprofen as directed as needed for fever/pain. Avoid tylenol if you have a history of liver disease. Do not take ibuprofen if you have a history of GI bleeding, kidney disease, or if you take blood thinners.     - You can take over-the-counter claritin, zyrtec, allegra, or xyzal as directed. These are antihistamines that can help with runny nose, nasal congestion, sneezing, and helps to dry up post-nasal drip, which usually causes sore throat and cough.   - If you do NOT have high blood pressure, you may use a decongestant form (D)  of this medication (ie. Claritin- D, zyrtec-D, allegra-D) or if you do not take the D form, you can take sudafed (pseudoephedrine) over the counter, which is a decongestant. Do NOT take two decongestant (D) medications at the same time (such as mucinex-D and claritin-D or plain sudafed and claritin D)    - You can  use Flonase (fluticasone) nasal spray as directed for sinus congestion and postnasal drip. This is a steroid nasal spray that works locally over time to decrease the inflammation in your nose/sinuses and help with allergic symptoms. This is not an quick- relief spray like afrin, but it works well if used daily.  Discontinue if you develop nose bleed  - use nasal saline prior to Flonase.  - Use Ocean Spray Nasal Saline 1-3 puffs each nostril every 2-3 hours then blow out onto tissue. This is to irrigate the nasal passage way to clear the sinus openings. Use until sinus problem resolved.    - you can take plain Mucinex (guaifenesin) 1200 mg twice a day to help loosen mucous.     -warm salt water gargles can help with sore throat    - warm tea with honey can help with cough. Honey is a natural cough suppressant.    - Dextromethorphan (DM) is a cough suppressant over the counter (ie. mucinex DM, robitussin, delsym; dayquil/nyquil has DM as well.)    - Follow up with your PCP or specialty clinic as directed in the next 1-2 weeks if not improved or as needed.  You can call [phone] to schedule an appointment with the appropriate provider.      - Go to the ER if you develop new or worsening symptoms.     - You must understand that you have received an Urgent Care treatment only and that you may be released before all of your medical problems are known or treated.   - You, the patient, will arrange for follow up care as instructed.   - If your condition worsens or fails to improve we recommend that you receive another evaluation at the ER immediately or contact your PCP to discuss your concerns or return here.     Patient Instructions   - You must understand that you have received an Urgent Care treatment only and that you may be released before all of your medical problems are known or treated.   - You, the patient, will arrange for follow up care as instructed.   - If your condition worsens or fails to improve we  recommend that you receive another evaluation at the ER immediately or contact your PCP to discuss your concerns or return here.     Advised on return/follow-up precautions. Advised on ER precautions. Answered all patient questions. Patient verbalized understanding and voiced agreement with current treatment plan.

## 2022-11-21 ENCOUNTER — PATIENT MESSAGE (OUTPATIENT)
Dept: ADMINISTRATIVE | Facility: HOSPITAL | Age: 47
End: 2022-11-21
Payer: COMMERCIAL

## 2023-01-04 ENCOUNTER — OFFICE VISIT (OUTPATIENT)
Dept: URGENT CARE | Facility: CLINIC | Age: 48
End: 2023-01-04
Payer: COMMERCIAL

## 2023-01-04 VITALS
TEMPERATURE: 99 F | SYSTOLIC BLOOD PRESSURE: 144 MMHG | WEIGHT: 300 LBS | HEART RATE: 64 BPM | HEIGHT: 73 IN | RESPIRATION RATE: 18 BRPM | OXYGEN SATURATION: 97 % | DIASTOLIC BLOOD PRESSURE: 73 MMHG | BODY MASS INDEX: 39.76 KG/M2

## 2023-01-04 DIAGNOSIS — B34.9 ACUTE VIRAL SYNDROME: Primary | ICD-10-CM

## 2023-01-04 DIAGNOSIS — R05.9 COUGH, UNSPECIFIED TYPE: ICD-10-CM

## 2023-01-04 LAB
CTP QC/QA: YES
SARS-COV-2 AG RESP QL IA.RAPID: NEGATIVE

## 2023-01-04 PROCEDURE — 3008F PR BODY MASS INDEX (BMI) DOCUMENTED: ICD-10-PCS | Mod: CPTII,S$GLB,,

## 2023-01-04 PROCEDURE — 1160F RVW MEDS BY RX/DR IN RCRD: CPT | Mod: CPTII,S$GLB,,

## 2023-01-04 PROCEDURE — 3008F BODY MASS INDEX DOCD: CPT | Mod: CPTII,S$GLB,,

## 2023-01-04 PROCEDURE — 87811 SARS CORONAVIRUS 2 ANTIGEN POCT, MANUAL READ: ICD-10-PCS | Mod: QW,S$GLB,,

## 2023-01-04 PROCEDURE — 1159F MED LIST DOCD IN RCRD: CPT | Mod: CPTII,S$GLB,,

## 2023-01-04 PROCEDURE — 71046 X-RAY EXAM CHEST 2 VIEWS: CPT | Mod: FY,S$GLB,, | Performed by: RADIOLOGY

## 2023-01-04 PROCEDURE — 3078F DIAST BP <80 MM HG: CPT | Mod: CPTII,S$GLB,,

## 2023-01-04 PROCEDURE — 71046 XR CHEST PA AND LATERAL: ICD-10-PCS | Mod: FY,S$GLB,, | Performed by: RADIOLOGY

## 2023-01-04 PROCEDURE — 3077F SYST BP >= 140 MM HG: CPT | Mod: CPTII,S$GLB,,

## 2023-01-04 PROCEDURE — 1159F PR MEDICATION LIST DOCUMENTED IN MEDICAL RECORD: ICD-10-PCS | Mod: CPTII,S$GLB,,

## 2023-01-04 PROCEDURE — 99214 OFFICE O/P EST MOD 30 MIN: CPT | Mod: S$GLB,,,

## 2023-01-04 PROCEDURE — 3077F PR MOST RECENT SYSTOLIC BLOOD PRESSURE >= 140 MM HG: ICD-10-PCS | Mod: CPTII,S$GLB,,

## 2023-01-04 PROCEDURE — 99214 PR OFFICE/OUTPT VISIT, EST, LEVL IV, 30-39 MIN: ICD-10-PCS | Mod: S$GLB,,,

## 2023-01-04 PROCEDURE — 3078F PR MOST RECENT DIASTOLIC BLOOD PRESSURE < 80 MM HG: ICD-10-PCS | Mod: CPTII,S$GLB,,

## 2023-01-04 PROCEDURE — 1160F PR REVIEW ALL MEDS BY PRESCRIBER/CLIN PHARMACIST DOCUMENTED: ICD-10-PCS | Mod: CPTII,S$GLB,,

## 2023-01-04 PROCEDURE — 87811 SARS-COV-2 COVID19 W/OPTIC: CPT | Mod: QW,S$GLB,,

## 2023-01-04 RX ORDER — PROMETHAZINE HYDROCHLORIDE AND DEXTROMETHORPHAN HYDROBROMIDE 6.25; 15 MG/5ML; MG/5ML
5 SYRUP ORAL EVERY 6 HOURS PRN
Qty: 118 ML | Refills: 0 | Status: SHIPPED | OUTPATIENT
Start: 2023-01-04 | End: 2023-01-20

## 2023-01-04 NOTE — PROGRESS NOTES
"Subjective:       Patient ID: Fitz Venegas Jr. is a 47 y.o. male.    Vitals:  height is 6' 1" (1.854 m) and weight is 136.1 kg (300 lb). His temperature is 98.7 °F (37.1 °C). His blood pressure is 144/73 (abnormal) and his pulse is 64. His respiration is 18 and oxygen saturation is 97%.     Chief Complaint: Cough    Pt complains since christmas of cough, chest congestion, post nasal drip, nasal congestion, headache, sinus pressure, fatigue, weakness, SOB. Took ibuprofen and started a z sonia today x2 pills.     Past Medical History:  4/27/2016: Obesity (BMI 30-39.9)  2011: Septic joint of left knee joint    Provider's note begins below:  The patient is a 46 y/o male with the above past medical hx here with c/o ongoing cough, post-nasal drip, headaches, sinus pressure, fatigue and chest congestion. Symptoms started on kg day. He reports fatigue is persistent and cough and chest congestion worsens at night. He wears a CPAP at night and has been cleaning it daily. He reports sinus congestion is getting better and has not worsened. He has a rx at home for z-pack and he started taking it yesterday. He denies fevers, SOB, CP, body aches, chills, abd pain, N/V/D, vision changes.     Cough  This is a recurrent problem. The current episode started 1 to 4 weeks ago. The problem has been unchanged. The problem occurs constantly. The cough is Non-productive. Associated symptoms include chills, ear pain, a fever, headaches, nasal congestion, postnasal drip and shortness of breath. Pertinent negatives include no chest pain, ear congestion, heartburn, hemoptysis, myalgias, rash, rhinorrhea, sore throat, sweats, weight loss or wheezing.     Constitution: Positive for chills and fever.   HENT:  Positive for ear pain and postnasal drip. Negative for sore throat.    Cardiovascular:  Negative for chest pain.   Respiratory:  Positive for cough and shortness of breath. Negative for bloody sputum and wheezing.    Gastrointestinal: "  Negative for heartburn.   Musculoskeletal:  Negative for muscle ache.   Skin:  Negative for rash.   Neurological:  Positive for headaches.     Objective:      Physical Exam   Constitutional: He is oriented to person, place, and time. He appears well-developed. He is cooperative.  Non-toxic appearance. He does not appear ill. No distress.   HENT:   Head: Normocephalic and atraumatic.   Ears:   Right Ear: Hearing, tympanic membrane, external ear and ear canal normal.   Left Ear: Hearing, tympanic membrane, external ear and ear canal normal.   Nose: Rhinorrhea and congestion present. No mucosal edema or nasal deformity. No epistaxis. Right sinus exhibits maxillary sinus tenderness and frontal sinus tenderness. Left sinus exhibits maxillary sinus tenderness and frontal sinus tenderness.   Mouth/Throat: Uvula is midline and mucous membranes are normal. No trismus in the jaw. Normal dentition. No uvula swelling. Posterior oropharyngeal erythema present. No oropharyngeal exudate or posterior oropharyngeal edema. No tonsillar exudate.   Eyes: Conjunctivae and lids are normal. No scleral icterus.   Neck: Trachea normal and phonation normal. Neck supple. No edema present. No erythema present. No neck rigidity present.   Cardiovascular: Normal rate, regular rhythm, normal heart sounds and normal pulses.   Pulmonary/Chest: Effort normal and breath sounds normal. No stridor. No respiratory distress. He has no decreased breath sounds. He has no wheezes. He has no rhonchi. He has no rales. He exhibits no tenderness.   Abdominal: Normal appearance. Soft.   Musculoskeletal: Normal range of motion.         General: No deformity. Normal range of motion.   Lymphadenopathy:     He has no cervical adenopathy.   Neurological: He is alert and oriented to person, place, and time. He exhibits normal muscle tone. Coordination normal.   Skin: Skin is warm, dry, intact, not diaphoretic and not pale. Capillary refill takes less than 2 seconds.    Psychiatric: His speech is normal and behavior is normal. Judgment and thought content normal.   Nursing note and vitals reviewed.        Results for orders placed or performed in visit on 01/04/23   SARS Coronavirus 2 Antigen, POCT Manual Read   Result Value Ref Range    SARS Coronavirus 2 Antigen Negative Negative     Acceptable Yes      EXAMINATION:  XR CHEST PA AND LATERAL     CLINICAL HISTORY:  Cough, unspecified     TECHNIQUE:  PA and lateral views of the chest were performed.     COMPARISON:  11/17/2019     FINDINGS:  The cardiomediastinal silhouette is not enlarged.  There is no pleural effusion.  The trachea is midline.  The lungs are symmetrically expanded bilaterally with coarse interstitial attenuation, similar to the previous exam.  No large focal consolidation seen.  There is no pneumothorax.  The osseous structures are unremarkable.     Impression:     1. Interstitial findings may reflect central way shin related to habitus, differential would include interstitial edema or other nonspecific interstitial pneumonitis.  Correlation is advised.        Electronically signed by: Will Joshi MD  Date:                                            01/04/2023  Time:                                           19:20      Assessment:       1. Acute viral syndrome    2. Cough, unspecified type          Plan:         Acute viral syndrome    Cough, unspecified type  -     SARS Coronavirus 2 Antigen, POCT Manual Read  -     XR CHEST PA AND LATERAL; Future; Expected date: 01/04/2023  -     promethazine-dextromethorphan (PROMETHAZINE-DM) 6.25-15 mg/5 mL Syrp; Take 5 mLs by mouth every 6 (six) hours as needed (cough).  Dispense: 118 mL; Refill: 0         Discussed results/diagnosis/plan with patient in clinic. Strict precautions given to patient to monitor for worsening signs and symptoms. Advised to follow up with PCP or specialist.  Explained side effects of medications prescribed with patient and  informed him/her to discontinue use if he/she has any side effects and to inform UC or PCP if this occurs. All questions answered. Strict ED verses clinic return precautions stressed and given in depth. Advised if symptoms worsens of fail to improve he/she should go to the Emergency Room. Discharge and follow-up instructions given verbally/printed with the patient who expressed understanding and willingness to comply with my recommendations. Patient voiced understanding and in agreement with current treatment plan. Patient exits the exam room in no acute distress. Conversant and engaged during discharge discussion, verbalized understanding.

## 2023-01-05 NOTE — PATIENT INSTRUCTIONS
You are negative for COVID today. You can continue already prescribed z-pack for possible pneumonia. Try promethazine/DM cough syrup at night. This will make you drowsy. Make sure not to drive, drink alcohol, operate machinery or take other sedating medications while taking this.      Try a decongestant and corticosteroid nasal spray like flonase for the next few days for sinus relief. An antihistamine like zyrtec, claritin, allegra can also be helpful for sinus relief. Neti pot irrigation, humidifier in your room, saline nasal spray and warm compresses to face can help. (Guaifenesin) Mucinex 1200 mg twice per day for 10 days can help thin secretions for better clearance. Drink plenty of fluids with this. Honey is a natural cough suppressant.     Please only use over the counter cough and cold medications for 3-5 days at a time to avoid rebound symptoms.     Getting plenty of rest can aid in a faster recovery of illnesses.     Avoid OTC cough and cold medications that can raise your BP such as dextromethorphan, pseudoephedrine, phenylephrine, naphazoline and oxymetazoline. Try DASH diet and buy a blood pressure cuff for home monitoring. Check blood pressure at least 2 times per day and create a log. Avoid eating foods that are high in salt.     Alternate Tylenol and ibuprofen every 4 hours as needed for fever and body aches.  Please take NSAIDs with a full glass of water and food to avoid GI upset.  Get plenty of rest.  Drink at least 3 L of water per day to clear/thin secretions.      Please follow-up with your primary care provider for ongoing symptoms and report to the ER if you have chest pain, shortness of breath, palpitations.

## 2023-01-12 ENCOUNTER — OFFICE VISIT (OUTPATIENT)
Dept: PRIMARY CARE CLINIC | Facility: CLINIC | Age: 48
End: 2023-01-12
Payer: COMMERCIAL

## 2023-01-12 VITALS
HEIGHT: 73 IN | OXYGEN SATURATION: 96 % | SYSTOLIC BLOOD PRESSURE: 142 MMHG | TEMPERATURE: 98 F | WEIGHT: 315 LBS | BODY MASS INDEX: 41.75 KG/M2 | DIASTOLIC BLOOD PRESSURE: 84 MMHG | HEART RATE: 74 BPM

## 2023-01-12 DIAGNOSIS — R03.0 ELEVATED BLOOD PRESSURE READING WITHOUT DIAGNOSIS OF HYPERTENSION: ICD-10-CM

## 2023-01-12 DIAGNOSIS — I73.9 CLAUDICATION: ICD-10-CM

## 2023-01-12 DIAGNOSIS — I87.8 VENOUS STASIS: ICD-10-CM

## 2023-01-12 DIAGNOSIS — S02.2XXS CLOSED FRACTURE OF NASAL BONE, SEQUELA: ICD-10-CM

## 2023-01-12 DIAGNOSIS — G47.33 OSA (OBSTRUCTIVE SLEEP APNEA): ICD-10-CM

## 2023-01-12 DIAGNOSIS — Z00.00 ROUTINE CHECK-UP: Primary | ICD-10-CM

## 2023-01-12 DIAGNOSIS — R93.89 ABNORMAL CXR: ICD-10-CM

## 2023-01-12 DIAGNOSIS — N20.0 KIDNEY STONE: ICD-10-CM

## 2023-01-12 DIAGNOSIS — M10.9 GOUT, UNSPECIFIED CAUSE, UNSPECIFIED CHRONICITY, UNSPECIFIED SITE: ICD-10-CM

## 2023-01-12 DIAGNOSIS — Z87.891 FORMER SMOKER: ICD-10-CM

## 2023-01-12 PROBLEM — S02.2XXA CLOSED FRACTURE OF NASAL BONES: Status: ACTIVE | Noted: 2023-01-12

## 2023-01-12 PROCEDURE — 1159F MED LIST DOCD IN RCRD: CPT | Mod: CPTII,S$GLB,, | Performed by: STUDENT IN AN ORGANIZED HEALTH CARE EDUCATION/TRAINING PROGRAM

## 2023-01-12 PROCEDURE — 99215 PR OFFICE/OUTPT VISIT, EST, LEVL V, 40-54 MIN: ICD-10-PCS | Mod: S$GLB,,, | Performed by: STUDENT IN AN ORGANIZED HEALTH CARE EDUCATION/TRAINING PROGRAM

## 2023-01-12 PROCEDURE — 3077F PR MOST RECENT SYSTOLIC BLOOD PRESSURE >= 140 MM HG: ICD-10-PCS | Mod: CPTII,S$GLB,, | Performed by: STUDENT IN AN ORGANIZED HEALTH CARE EDUCATION/TRAINING PROGRAM

## 2023-01-12 PROCEDURE — 99999 PR PBB SHADOW E&M-EST. PATIENT-LVL IV: CPT | Mod: PBBFAC,,, | Performed by: STUDENT IN AN ORGANIZED HEALTH CARE EDUCATION/TRAINING PROGRAM

## 2023-01-12 PROCEDURE — 99999 PR PBB SHADOW E&M-EST. PATIENT-LVL IV: ICD-10-PCS | Mod: PBBFAC,,, | Performed by: STUDENT IN AN ORGANIZED HEALTH CARE EDUCATION/TRAINING PROGRAM

## 2023-01-12 PROCEDURE — 3079F PR MOST RECENT DIASTOLIC BLOOD PRESSURE 80-89 MM HG: ICD-10-PCS | Mod: CPTII,S$GLB,, | Performed by: STUDENT IN AN ORGANIZED HEALTH CARE EDUCATION/TRAINING PROGRAM

## 2023-01-12 PROCEDURE — 3008F PR BODY MASS INDEX (BMI) DOCUMENTED: ICD-10-PCS | Mod: CPTII,S$GLB,, | Performed by: STUDENT IN AN ORGANIZED HEALTH CARE EDUCATION/TRAINING PROGRAM

## 2023-01-12 PROCEDURE — 3077F SYST BP >= 140 MM HG: CPT | Mod: CPTII,S$GLB,, | Performed by: STUDENT IN AN ORGANIZED HEALTH CARE EDUCATION/TRAINING PROGRAM

## 2023-01-12 PROCEDURE — 3008F BODY MASS INDEX DOCD: CPT | Mod: CPTII,S$GLB,, | Performed by: STUDENT IN AN ORGANIZED HEALTH CARE EDUCATION/TRAINING PROGRAM

## 2023-01-12 PROCEDURE — 1159F PR MEDICATION LIST DOCUMENTED IN MEDICAL RECORD: ICD-10-PCS | Mod: CPTII,S$GLB,, | Performed by: STUDENT IN AN ORGANIZED HEALTH CARE EDUCATION/TRAINING PROGRAM

## 2023-01-12 PROCEDURE — 99215 OFFICE O/P EST HI 40 MIN: CPT | Mod: S$GLB,,, | Performed by: STUDENT IN AN ORGANIZED HEALTH CARE EDUCATION/TRAINING PROGRAM

## 2023-01-12 PROCEDURE — 3079F DIAST BP 80-89 MM HG: CPT | Mod: CPTII,S$GLB,, | Performed by: STUDENT IN AN ORGANIZED HEALTH CARE EDUCATION/TRAINING PROGRAM

## 2023-01-12 NOTE — PROGRESS NOTES
Fitz Vengeas Jr.  1975        Subjective     Chief Complaint: Est care    History of Present Illness:  Mr. Fitz Venegas Jr. is a 47 y.o. male who presents to clinic for est care.    Hx of kidney stones. No surgery. Was not drinking enough water maybe? Was in [place] at that time. Was not able to     Gout in family. Saw Ortho for ankle pain, told he may have gout.     CASANDRA- uses cpap machine. Feeling better with this.    Due for colonoscopy. Can discuss next visit.    PreDm2 in the past, not on recent A1c. Wants to re-check.     LE skin changes and claudication symptoms. Also with ED. Former smoker. Had [facility] 5/2019 -clear coronaries. Trivial MR on echo from 2019.    Lab Results   Component Value Date    HGBA1C 5.4 05/03/2019        Broke nose when he was younger, thinks may have had deviated septum. Would like to see ENT.     Tried Flonase BID consistently without help.   Tried claritin, rhinocort, afrin without trouble.     BP high today but pt states he had cuff at home and was checking. Thinks it was normal.     Former smoker pack a day for 5 nani years.     Review of Systems   Constitutional:  Positive for malaise/fatigue. Negative for chills and fever.   HENT:  Positive for congestion.    Respiratory:  Positive for cough. Negative for wheezing.    Cardiovascular:  Negative for palpitations.   Gastrointestinal:  Negative for abdominal pain, diarrhea, nausea and vomiting.   Musculoskeletal:  Negative for myalgias.   Neurological:  Negative for tremors and speech change.      PAST HISTORY:     Past Medical History:   Diagnosis Date    Obesity (BMI 30-39.9) 4/27/2016    Septic joint of left knee joint 2011       Past Surgical History:   Procedure Laterality Date    CORONARY ANGIOGRAPHY N/A 5/3/2019    Procedure: ANGIOGRAM, CORONARY ARTERY;  Surgeon: Joey Carter MD;  Location: [facility] CATH LAB;  Service: Cardiology;  Laterality: N/A;    KNEE ARTHROSCOPY Left     LEFT HEART CATHETERIZATION Right 5/3/2019     Procedure: Left heart cath;  Surgeon: Joey Carter MD;  Location: [facility] CATH LAB;  Service: Cardiology;  Laterality: Right;       Family History   Problem Relation Age of Onset    Heart attack Father 53    Heart disease Paternal Uncle         coronary stents    Coronary artery disease Paternal Grandfather         CABG and multiple stents    Hypertension Maternal Uncle        Social History     Socioeconomic History    Marital status:    Tobacco Use    Smoking status: Former     Types: Cigarettes    Smokeless tobacco: Never    Tobacco comments:     quit @ age 32; smoked x 10 years   Substance and Sexual Activity    Alcohol use: Yes     Comment: weekends    Drug use: No    Sexual activity: Yes     Partners: Female     Birth control/protection: I.U.D.   Social History Narrative    Engaged to Arabella;  daughter Bert    Has 1 daughter from previous marriage       MEDICATIONS & ALLERGIES:     Current Outpatient Medications on File Prior to Visit   Medication Sig    ibuprofen (ADVIL,MOTRIN) 600 MG tablet Take 1 tablet (600 mg total) by mouth every 6 (six) hours as needed for Pain. (Patient taking differently: Take 800 mg by mouth every 6 (six) hours as needed for Pain.)    promethazine-dextromethorphan (PROMETHAZINE-DM) 6.25-15 mg/5 mL Syrp Take 5 mLs by mouth every 6 (six) hours as needed (cough).    promethazine-dextromethorphan (PROMETHAZINE-DM) 6.25-15 mg/5 mL Syrp Take 5 mLs by mouth every 6 (six) hours as needed (cough).    [DISCONTINUED] HYDROcodone-acetaminophen (NORCO) 7.5-325 mg per tablet Take 1 tablet by mouth every 6 (six) hours as needed for Pain. (Patient not taking: Reported on 2023)    [DISCONTINUED] ondansetron (ZOFRAN-ODT) 4 MG TbDL Take 1 tablet (4 mg total) by mouth every 6 (six) hours as needed (nausea). (Patient not taking: Reported on 2022)    [DISCONTINUED] oxyCODONE-acetaminophen (PERCOCET) 5-325 mg per tablet Take 1 tablet by mouth every 4 (four) hours as needed for  "Pain. (Patient not taking: Reported on 11/17/2022)    [DISCONTINUED] tamsulosin (FLOMAX) 0.4 mg Cap Take 1 capsule (0.4 mg total) by mouth once daily. (Patient not taking: Reported on 11/17/2022)     No current facility-administered medications on file prior to visit.       Review of patient's allergies indicates:  No Known Allergies    OBJECTIVE:     Vital Signs:  Vitals:    01/12/23 1609   BP: (!) 142/84   BP Location: Left arm   Patient Position: Sitting   Pulse: 74   Temp: 98 °F (36.7 °C)   SpO2: 96%   Weight: (!) 161.8 kg (356 lb 11.3 oz)   Height: 6' 1" (1.854 m)       Body mass index is 47.06 kg/m².     Physical Exam:  Physical Exam  Vitals and nursing note reviewed.   Constitutional:       General: He is not in acute distress.     Appearance: Normal appearance. He is not ill-appearing, toxic-appearing or diaphoretic.   HENT:      Head: Normocephalic and atraumatic.   Eyes:      General: No scleral icterus.  Cardiovascular:      Rate and Rhythm: Normal rate and regular rhythm.   Pulmonary:      Effort: Pulmonary effort is normal. No respiratory distress.   Musculoskeletal:      Comments: Chronic LE venous stasis skin changes  No pitting edema   Skin:     General: Skin is warm and dry.   Neurological:      Mental Status: He is alert and oriented to person, place, and time.   Psychiatric:         Mood and Affect: Mood and affect normal.         Behavior: Behavior normal.          Laboratory  Lab Results   Component Value Date    WBC 9.26 06/15/2022    HGB 16.5 06/15/2022    HCT 46.2 06/15/2022    MCV 91 06/15/2022     06/15/2022     Lab Results   Component Value Date     (H) 06/15/2022     06/15/2022    K 4.0 06/15/2022     06/15/2022    CO2 21 (L) 06/15/2022    BUN 20 06/15/2022    CREATININE 1.4 06/15/2022    CALCIUM 9.7 06/15/2022    MG 1.9 05/03/2019     No results found for: INR, PROTIME  Lab Results   Component Value Date    HGBA1C 5.4 05/03/2019           Health Maintenance  "        Date Due Completion Date    Hemoglobin A1c (Prediabetes) 05/03/2020 5/3/2019    COVID-19 Vaccine (3 - Booster for Pfizer series) 10/20/2021 8/25/2021    Colorectal Cancer Screening Never done ---    Influenza Vaccine (1) 09/01/2022 1/25/2022    TETANUS VACCINE 01/11/2027 1/11/2017    Lipid Panel 01/31/2027 1/31/2022              ASSESSMENT & PLAN:   Mr. Fitz Venegas Jr. is a 47 y.o. male who was seen today in clinic for est care.     1. Routine check-up  -     Comprehensive Metabolic Panel; Future; Expected date: 01/12/2023  -     CBC Auto Differential; Future; Expected date: 01/12/2023  -     HEMOGLOBIN A1C; Future; Expected date: 01/12/2023  -     Lipid Panel; Future; Expected date: 01/12/2023  -     URIC ACID; Future; Expected date: 01/12/2023  -     TSH; Future; Expected date: 01/12/2023  -     Vitamin D; Future; Expected date: 01/12/2023    2. Elevated blood pressure reading without diagnosis of hypertension  -     Lipid Panel; Future; Expected date: 01/12/2023  -     TSH; Future; Expected date: 01/12/2023  -      Has cuff, check at home, keep log, bring next visit, start losartan if high    3. Gout, unspecified cause, unspecified chronicity, unspecified site  -     URIC ACID; Future; Expected date: 01/12/2023    4. Kidney stone  -Check for gout, may have been uric acid stone  -encourage hydration    5. CASANDRA (obstructive sleep apnea)  -continue Cpap    6. BMI 45.0-49.9, adult  -Check labs, discuss weight loss next visit    7. Claudication  8. Venous stasis  -Echo done 2019, former smoker, check ABIs next visit, consider vascular surgery. Suspect ED 2/2 this, consider cialis once cards/vas w/o done    9. Closed fracture of nasal bone, sequela  -     Ambulatory referral/consult to ENT; Future; Expected date: 01/19/2023  -     Broke years ago, thinks has residual issues, wants to see ENT    10. Abnormal CXR  -Taken at  when sick, still with mild cough, re-check next visit        RTC in 1 week. Need to  discuss colonoscopy, cxr, weight loss, abis. AAA at 67yrs.    Belia Laws MD  Internal Medicine     Established. Time spent in the evaluation and management of this patient exceeded 60 min and greater than 50% of this time was in face-to-face time with the patient.    Total time including the following:  -preparing to see the patient (e.g., obtaining and/or reviewing old records such as old primary care notes, specialists notes, hospital notes, review of laboratory tests, radiographic and/or cardiology studies)  -orders which can include medications, laboratory studies, radiographic studies, procedures, referrals etcetera   --communicating with the patient and/or family member/caregiver regarding a detailed explanation of the treatment/care plan  -arranging possible referrals and follow-up plan  -documentation of the visit in the electronic health record

## 2023-01-13 ENCOUNTER — LAB VISIT (OUTPATIENT)
Dept: LAB | Facility: HOSPITAL | Age: 48
End: 2023-01-13
Attending: STUDENT IN AN ORGANIZED HEALTH CARE EDUCATION/TRAINING PROGRAM
Payer: COMMERCIAL

## 2023-01-13 DIAGNOSIS — R03.0 ELEVATED BLOOD PRESSURE READING WITHOUT DIAGNOSIS OF HYPERTENSION: ICD-10-CM

## 2023-01-13 DIAGNOSIS — M10.9 GOUT, UNSPECIFIED CAUSE, UNSPECIFIED CHRONICITY, UNSPECIFIED SITE: ICD-10-CM

## 2023-01-13 DIAGNOSIS — Z00.00 ROUTINE CHECK-UP: ICD-10-CM

## 2023-01-13 LAB
25(OH)D3+25(OH)D2 SERPL-MCNC: 21 NG/ML (ref 30–96)
ALBUMIN SERPL BCP-MCNC: 3.9 G/DL (ref 3.5–5.2)
ALP SERPL-CCNC: 108 U/L (ref 55–135)
ALT SERPL W/O P-5'-P-CCNC: 33 U/L (ref 10–44)
ANION GAP SERPL CALC-SCNC: 10 MMOL/L (ref 8–16)
AST SERPL-CCNC: 20 U/L (ref 10–40)
BASOPHILS # BLD AUTO: 0.06 K/UL (ref 0–0.2)
BASOPHILS NFR BLD: 1.3 % (ref 0–1.9)
BILIRUB SERPL-MCNC: 0.9 MG/DL (ref 0.1–1)
BUN SERPL-MCNC: 12 MG/DL (ref 6–20)
CALCIUM SERPL-MCNC: 9.2 MG/DL (ref 8.7–10.5)
CHLORIDE SERPL-SCNC: 103 MMOL/L (ref 95–110)
CHOLEST SERPL-MCNC: 160 MG/DL (ref 120–199)
CHOLEST/HDLC SERPL: 4.6 {RATIO} (ref 2–5)
CO2 SERPL-SCNC: 23 MMOL/L (ref 23–29)
CREAT SERPL-MCNC: 0.9 MG/DL (ref 0.5–1.4)
DIFFERENTIAL METHOD: NORMAL
EOSINOPHIL # BLD AUTO: 0.1 K/UL (ref 0–0.5)
EOSINOPHIL NFR BLD: 2.1 % (ref 0–8)
ERYTHROCYTE [DISTWIDTH] IN BLOOD BY AUTOMATED COUNT: 12.4 % (ref 11.5–14.5)
EST. GFR  (NO RACE VARIABLE): >60 ML/MIN/1.73 M^2
ESTIMATED AVG GLUCOSE: 114 MG/DL (ref 68–131)
GLUCOSE SERPL-MCNC: 128 MG/DL (ref 70–110)
HBA1C MFR BLD: 5.6 % (ref 4–5.6)
HCT VFR BLD AUTO: 48.3 % (ref 40–54)
HDLC SERPL-MCNC: 35 MG/DL (ref 40–75)
HDLC SERPL: 21.9 % (ref 20–50)
HGB BLD-MCNC: 16.1 G/DL (ref 14–18)
IMM GRANULOCYTES # BLD AUTO: 0.02 K/UL (ref 0–0.04)
IMM GRANULOCYTES NFR BLD AUTO: 0.4 % (ref 0–0.5)
LDLC SERPL CALC-MCNC: 94.6 MG/DL (ref 63–159)
LYMPHOCYTES # BLD AUTO: 1.7 K/UL (ref 1–4.8)
LYMPHOCYTES NFR BLD: 35.2 % (ref 18–48)
MCH RBC QN AUTO: 31 PG (ref 27–31)
MCHC RBC AUTO-ENTMCNC: 33.3 G/DL (ref 32–36)
MCV RBC AUTO: 93 FL (ref 82–98)
MONOCYTES # BLD AUTO: 0.4 K/UL (ref 0.3–1)
MONOCYTES NFR BLD: 9.2 % (ref 4–15)
NEUTROPHILS # BLD AUTO: 2.5 K/UL (ref 1.8–7.7)
NEUTROPHILS NFR BLD: 51.8 % (ref 38–73)
NONHDLC SERPL-MCNC: 125 MG/DL
NRBC BLD-RTO: 0 /100 WBC
PLATELET # BLD AUTO: 270 K/UL (ref 150–450)
PMV BLD AUTO: 10.5 FL (ref 9.2–12.9)
POTASSIUM SERPL-SCNC: 4.6 MMOL/L (ref 3.5–5.1)
PROT SERPL-MCNC: 7.5 G/DL (ref 6–8.4)
RBC # BLD AUTO: 5.19 M/UL (ref 4.6–6.2)
SODIUM SERPL-SCNC: 136 MMOL/L (ref 136–145)
TRIGL SERPL-MCNC: 152 MG/DL (ref 30–150)
TSH SERPL DL<=0.005 MIU/L-ACNC: 2.33 UIU/ML (ref 0.4–4)
URATE SERPL-MCNC: 8.9 MG/DL (ref 3.4–7)
WBC # BLD AUTO: 4.8 K/UL (ref 3.9–12.7)

## 2023-01-13 PROCEDURE — 85025 COMPLETE CBC W/AUTO DIFF WBC: CPT | Performed by: STUDENT IN AN ORGANIZED HEALTH CARE EDUCATION/TRAINING PROGRAM

## 2023-01-13 PROCEDURE — 84443 ASSAY THYROID STIM HORMONE: CPT | Performed by: STUDENT IN AN ORGANIZED HEALTH CARE EDUCATION/TRAINING PROGRAM

## 2023-01-13 PROCEDURE — 80061 LIPID PANEL: CPT | Performed by: STUDENT IN AN ORGANIZED HEALTH CARE EDUCATION/TRAINING PROGRAM

## 2023-01-13 PROCEDURE — 36415 COLL VENOUS BLD VENIPUNCTURE: CPT | Mod: PN | Performed by: STUDENT IN AN ORGANIZED HEALTH CARE EDUCATION/TRAINING PROGRAM

## 2023-01-13 PROCEDURE — 83036 HEMOGLOBIN GLYCOSYLATED A1C: CPT | Performed by: STUDENT IN AN ORGANIZED HEALTH CARE EDUCATION/TRAINING PROGRAM

## 2023-01-13 PROCEDURE — 82306 VITAMIN D 25 HYDROXY: CPT | Performed by: STUDENT IN AN ORGANIZED HEALTH CARE EDUCATION/TRAINING PROGRAM

## 2023-01-13 PROCEDURE — 80053 COMPREHEN METABOLIC PANEL: CPT | Performed by: STUDENT IN AN ORGANIZED HEALTH CARE EDUCATION/TRAINING PROGRAM

## 2023-01-13 PROCEDURE — 84550 ASSAY OF BLOOD/URIC ACID: CPT | Performed by: STUDENT IN AN ORGANIZED HEALTH CARE EDUCATION/TRAINING PROGRAM

## 2023-01-20 ENCOUNTER — OFFICE VISIT (OUTPATIENT)
Dept: PRIMARY CARE CLINIC | Facility: CLINIC | Age: 48
End: 2023-01-20
Payer: COMMERCIAL

## 2023-01-20 ENCOUNTER — OFFICE VISIT (OUTPATIENT)
Dept: OTOLARYNGOLOGY | Facility: CLINIC | Age: 48
End: 2023-01-20
Payer: COMMERCIAL

## 2023-01-20 ENCOUNTER — TELEPHONE (OUTPATIENT)
Dept: PRIMARY CARE CLINIC | Facility: CLINIC | Age: 48
End: 2023-01-20

## 2023-01-20 VITALS
OXYGEN SATURATION: 97 % | HEART RATE: 76 BPM | SYSTOLIC BLOOD PRESSURE: 128 MMHG | RESPIRATION RATE: 18 BRPM | WEIGHT: 315 LBS | HEIGHT: 73 IN | TEMPERATURE: 98 F | DIASTOLIC BLOOD PRESSURE: 72 MMHG | BODY MASS INDEX: 41.75 KG/M2

## 2023-01-20 DIAGNOSIS — I87.8 VENOUS STASIS: ICD-10-CM

## 2023-01-20 DIAGNOSIS — Z87.891 FORMER SMOKER: ICD-10-CM

## 2023-01-20 DIAGNOSIS — M10.9 GOUT, UNSPECIFIED CAUSE, UNSPECIFIED CHRONICITY, UNSPECIFIED SITE: Primary | ICD-10-CM

## 2023-01-20 DIAGNOSIS — J31.0 CHRONIC RHINITIS: Primary | ICD-10-CM

## 2023-01-20 DIAGNOSIS — E78.5 HYPERLIPIDEMIA, UNSPECIFIED HYPERLIPIDEMIA TYPE: ICD-10-CM

## 2023-01-20 DIAGNOSIS — I10 ESSENTIAL HYPERTENSION: ICD-10-CM

## 2023-01-20 DIAGNOSIS — G47.33 OSA (OBSTRUCTIVE SLEEP APNEA): ICD-10-CM

## 2023-01-20 DIAGNOSIS — I73.9 CLAUDICATION: ICD-10-CM

## 2023-01-20 DIAGNOSIS — S02.2XXS CLOSED FRACTURE OF NASAL BONE, SEQUELA: ICD-10-CM

## 2023-01-20 DIAGNOSIS — Z12.11 COLON CANCER SCREENING: ICD-10-CM

## 2023-01-20 DIAGNOSIS — N20.0 KIDNEY STONE: ICD-10-CM

## 2023-01-20 DIAGNOSIS — E55.9 VITAMIN D DEFICIENCY: ICD-10-CM

## 2023-01-20 DIAGNOSIS — R93.89 ABNORMAL CXR: ICD-10-CM

## 2023-01-20 PROCEDURE — 1159F MED LIST DOCD IN RCRD: CPT | Mod: CPTII,S$GLB,, | Performed by: STUDENT IN AN ORGANIZED HEALTH CARE EDUCATION/TRAINING PROGRAM

## 2023-01-20 PROCEDURE — 99202 PR OFFICE/OUTPT VISIT, NEW, LEVL II, 15-29 MIN: ICD-10-PCS | Mod: 95,,, | Performed by: OTOLARYNGOLOGY

## 2023-01-20 PROCEDURE — 3008F PR BODY MASS INDEX (BMI) DOCUMENTED: ICD-10-PCS | Mod: CPTII,S$GLB,, | Performed by: STUDENT IN AN ORGANIZED HEALTH CARE EDUCATION/TRAINING PROGRAM

## 2023-01-20 PROCEDURE — 3044F PR MOST RECENT HEMOGLOBIN A1C LEVEL <7.0%: ICD-10-PCS | Mod: CPTII,S$GLB,, | Performed by: STUDENT IN AN ORGANIZED HEALTH CARE EDUCATION/TRAINING PROGRAM

## 2023-01-20 PROCEDURE — 3008F BODY MASS INDEX DOCD: CPT | Mod: CPTII,S$GLB,, | Performed by: STUDENT IN AN ORGANIZED HEALTH CARE EDUCATION/TRAINING PROGRAM

## 2023-01-20 PROCEDURE — 1160F RVW MEDS BY RX/DR IN RCRD: CPT | Mod: CPTII,S$GLB,, | Performed by: STUDENT IN AN ORGANIZED HEALTH CARE EDUCATION/TRAINING PROGRAM

## 2023-01-20 PROCEDURE — 99214 PR OFFICE/OUTPT VISIT, EST, LEVL IV, 30-39 MIN: ICD-10-PCS | Mod: S$GLB,,, | Performed by: STUDENT IN AN ORGANIZED HEALTH CARE EDUCATION/TRAINING PROGRAM

## 2023-01-20 PROCEDURE — 99214 OFFICE O/P EST MOD 30 MIN: CPT | Mod: S$GLB,,, | Performed by: STUDENT IN AN ORGANIZED HEALTH CARE EDUCATION/TRAINING PROGRAM

## 2023-01-20 PROCEDURE — 3044F PR MOST RECENT HEMOGLOBIN A1C LEVEL <7.0%: ICD-10-PCS | Mod: CPTII,95,, | Performed by: OTOLARYNGOLOGY

## 2023-01-20 PROCEDURE — 3074F SYST BP LT 130 MM HG: CPT | Mod: CPTII,S$GLB,, | Performed by: STUDENT IN AN ORGANIZED HEALTH CARE EDUCATION/TRAINING PROGRAM

## 2023-01-20 PROCEDURE — 99999 PR PBB SHADOW E&M-EST. PATIENT-LVL V: ICD-10-PCS | Mod: PBBFAC,,, | Performed by: STUDENT IN AN ORGANIZED HEALTH CARE EDUCATION/TRAINING PROGRAM

## 2023-01-20 PROCEDURE — 3044F HG A1C LEVEL LT 7.0%: CPT | Mod: CPTII,S$GLB,, | Performed by: STUDENT IN AN ORGANIZED HEALTH CARE EDUCATION/TRAINING PROGRAM

## 2023-01-20 PROCEDURE — 99999 PR PBB SHADOW E&M-EST. PATIENT-LVL V: CPT | Mod: PBBFAC,,, | Performed by: STUDENT IN AN ORGANIZED HEALTH CARE EDUCATION/TRAINING PROGRAM

## 2023-01-20 PROCEDURE — 1160F PR REVIEW ALL MEDS BY PRESCRIBER/CLIN PHARMACIST DOCUMENTED: ICD-10-PCS | Mod: CPTII,S$GLB,, | Performed by: STUDENT IN AN ORGANIZED HEALTH CARE EDUCATION/TRAINING PROGRAM

## 2023-01-20 PROCEDURE — 3078F PR MOST RECENT DIASTOLIC BLOOD PRESSURE < 80 MM HG: ICD-10-PCS | Mod: CPTII,S$GLB,, | Performed by: STUDENT IN AN ORGANIZED HEALTH CARE EDUCATION/TRAINING PROGRAM

## 2023-01-20 PROCEDURE — 1159F PR MEDICATION LIST DOCUMENTED IN MEDICAL RECORD: ICD-10-PCS | Mod: CPTII,S$GLB,, | Performed by: STUDENT IN AN ORGANIZED HEALTH CARE EDUCATION/TRAINING PROGRAM

## 2023-01-20 PROCEDURE — 3078F DIAST BP <80 MM HG: CPT | Mod: CPTII,S$GLB,, | Performed by: STUDENT IN AN ORGANIZED HEALTH CARE EDUCATION/TRAINING PROGRAM

## 2023-01-20 PROCEDURE — 3044F HG A1C LEVEL LT 7.0%: CPT | Mod: CPTII,95,, | Performed by: OTOLARYNGOLOGY

## 2023-01-20 PROCEDURE — 3074F PR MOST RECENT SYSTOLIC BLOOD PRESSURE < 130 MM HG: ICD-10-PCS | Mod: CPTII,S$GLB,, | Performed by: STUDENT IN AN ORGANIZED HEALTH CARE EDUCATION/TRAINING PROGRAM

## 2023-01-20 PROCEDURE — 99202 OFFICE O/P NEW SF 15 MIN: CPT | Mod: 95,,, | Performed by: OTOLARYNGOLOGY

## 2023-01-20 RX ORDER — AZELASTINE 1 MG/ML
1 SPRAY, METERED NASAL 2 TIMES DAILY
Qty: 30 ML | Refills: 11 | Status: SHIPPED | OUTPATIENT
Start: 2023-01-20 | End: 2026-08-26

## 2023-01-20 RX ORDER — ROSUVASTATIN CALCIUM 20 MG/1
20 TABLET, COATED ORAL NIGHTLY
Qty: 90 TABLET | Refills: 0 | Status: CANCELLED | OUTPATIENT
Start: 2023-01-20

## 2023-01-20 RX ORDER — ALLOPURINOL 100 MG/1
100 TABLET ORAL DAILY
Qty: 30 TABLET | Refills: 0 | Status: SHIPPED | OUTPATIENT
Start: 2023-01-20 | End: 2023-02-19

## 2023-01-20 RX ORDER — FLUTICASONE PROPIONATE 50 MCG
2 SPRAY, SUSPENSION (ML) NASAL DAILY
Qty: 16 G | Refills: 6 | Status: SHIPPED | OUTPATIENT
Start: 2023-01-20 | End: 2023-02-19

## 2023-01-20 NOTE — PROGRESS NOTES
OTOLARYNGOLOGY- FACIAL PLASTIC & RECONSTRUCTIVE SURGERY      Fitz Venegas Jr.  [id number]    CC:nasal obstruction     HISTORY OF PRESENT ILLNESS: Fitz Venegas  is a 47 y.o. male who was referred to me by Dr. Belia Laws in consultation for nasal obstruction.  Fitz reports a several year history of difficulty breathing on left and right side. Started using CPAP a few months ago. Uses the humidifying feature.  Reports alternating congestion. Worse when lying down.     There is a history of nasal trauma that required a closed reduction at the age of 18.     Has tried flonase for years, claritin, and intranasal steroid spray.  Has not used these sprays consistently.      Past Medical History  He has a past medical history of Obesity (BMI 30-39.9) and Septic joint of left knee joint.    Past Surgical History  He has a past surgical history that includes Knee arthroscopy (Left); Coronary angiography (N/A, 5/3/2019); and Left heart catheterization (Right, 5/3/2019).    Family History  His family history includes Coronary artery disease in his paternal grandfather; Heart attack (age of onset: 53) in his father; Heart disease in his paternal uncle; Hypertension in his maternal uncle.    Social History  He reports that he has quit smoking. His smoking use included cigarettes. He has never used smokeless tobacco. He reports current alcohol use. He reports that he does not use drugs.    Allergies  He has No Known Allergies.    Medications  He has a current medication list which includes the following prescription(s): allopurinol, azelastine, and fluticasone propionate.      Review of Systems     Constitutional: Negative for appetite change, chills, fatigue, fever and unexpected weight loss.      HENT: Positive for sinus infection and sinus pressure.      Eyes:  Negative for change in eyesight, eye drainage, eye itching and photophobia.     Respiratory:  Positive for sleep apnea.      Cardiovascular:  Negative for chest  pain, foot swelling, irregular heartbeat and swollen veins.     Gastrointestinal:  Negative for abdominal pain, acid reflux, constipation, diarrhea, heartburn and vomiting.     Genitourinary: Negative for difficulty urinating, sexual problems and frequent urination.     Skin: Negative for rash.     Allergy: Negative for food allergies and seasonal allergies.     Endocrine: Negative for cold intolerance and heat intolerance.      Neurological: Negative for dizziness, headaches, light-headedness, seizures and tremors.      Hematologic: Negative for bruises/bleeds easily and swollen glands.      Psychiatric: Negative for decreased concentration, depression, nervous/anxious and sleep disturbance.          PHYSICAL EXAM:  Virtual visit   General: Alert and oriented in no acute distress  Head and Face: Normocephaic, atruamatic  Nose:  nasal bones appear fairly straight     ASSESSMENT:   1. Chronic rhinitis    2. Closed fracture of nasal bone, sequela            PLAN:     I had a long discussion with the patient regarding his condition and the further workup and management options. Start intranasal steroid spray and antihistamine spray BID. Follow up for in-person eval and nasal endoscopy. All questions answered and patient encouraged to call with any questions or concerns.

## 2023-01-20 NOTE — PATIENT INSTRUCTIONS
"Some easy dessert or "sweet" recipes to try if you are trying to limit your sugar intake or are a diabetic:    -1 sugar-free jello cup with small amount of sugar-free whipped cream    -Frozen Sugar-free Cool Whip with handful of frozen/fresh strawberries/raspberries/blackberries     -Sugar-free Popsicle      -Take one small banana and slice it up. Freeze the slices. Eat frozen or blend frozen banana up for a treat that is very close to ice-cream.    -HaloTop Ice Cream     -Unsweetened Greek Yogurt with handful of berries with drizzle of sugar-substitute     -Mocha Ricotta Cream: serve chilled    1?2 cup part-skim ricotta cheese  1?2 teaspoon unsweetened cocoa powder  1?4 teaspoon vanilla extract  1 package sugar substitute (like Stevia or Splenda)  1 dash espresso powder    -Lemon Ricotta Cream: same recipe as above except you use 1/4 teaspoon grated lemon peel in place of both the cocoa powder and espresso powder.      Low Carb Meal Ideas:     PS-watch the calories! Just because it's low carb does not mean it's healthy. Low carb dinner and lunch ideas are great for weight loss or pre-diabetics (plus for the diabetics).    -Turkey slices, tomatoes, avocado, +/- slice of cheese wrapped in lettuce wrap or low-carb labelled tortilla (look for one that shows the net carbs on the package. Net carbs= Total carbs-fiber).      -Cauliflower rice fried rice (buy pre-packaged or frozen cauli rice for convenience). Add soy sauce, onion, egg, veggies, protein of choice). Can add small amount of butter for flavor.     -Mashed cauliflower in place of mashed potatoes. Add your normal add-ins like garlic, milk, cream cheese, cheese, etc!    -Zucchini noodle pasta or lasagna    -Bake up a big batch of turkey or chicken meatballs (use more grated parmesan than breadcrumbs in the recipe)     -Clarke's Brand Tomato sauce (lowest in sugar)    -Ground turkey taco bowls    -Chicken parmesan with parmesan instead of breadcrumbs    -Avocados or " tomatoes stuffed with tuna salad    -Hummus and bell peppers/cucumbers    -Cherry tomatoes with laughing cow light cheese    -Handful of almonds

## 2023-01-20 NOTE — PROGRESS NOTES
Fitz Venegas Jr.  1975        Subjective     Chief Complaint: F/U    History of Present Illness:  Mr. Fitz Venegas Jr. is a 47 y.o. male who presents to clinic for follow-up.    Angiogram done 2019, no CAD. TG improving. LDL less than 100s.    Family hx of cardiac events. Uncles and dad with MI and CVA.    The 10-year ASCVD risk score (Handy RODRIGUEZ, et al., 2019) is: 2.2%    Values used to calculate the score:      Age: 47 years      Sex: Male      Is Non- : No      Diabetic: No      Tobacco smoker: No      Systolic Blood Pressure: 128 mmHg      Is BP treated: No      HDL Cholesterol: 35 mg/dL      Total Cholesterol: 160 mg/dL     Per my last note:  Hx of kidney stones. No surgery. Was not drinking enough water maybe? Was in [place] at that time. Was not able to     Gout in family. Saw Ortho for ankle pain, told he may have gout.     CASANDRA- uses cpap machine. Feeling better with this.    Due for colonoscopy. Can discuss next visit.    PreDm2 in the past, not on recent A1c. Wants to re-check.     LE skin changes and claudication symptoms. Also with ED. Former smoker. Had [facility] 5/2019 -clear coronaries. Trivial MR on echo from 2019.    Lab Results   Component Value Date    HGBA1C 5.6 01/13/2023        Broke nose when he was younger, thinks may have had deviated septum. Would like to see ENT.     Tried Flonase BID consistently without help.   Tried claritin, rhinocort, afrin without trouble.     BP high today but pt states he had cuff at home and was checking. Thinks it was normal.     Former smoker pack a day for 5 nani years.     Review of Systems   Constitutional:  Negative for chills, fever and malaise/fatigue.   HENT:  Negative for congestion and sore throat.    Respiratory:  Negative for cough, shortness of breath and wheezing.    Cardiovascular:  Positive for claudication.   Gastrointestinal:  Negative for abdominal pain, nausea and vomiting.   Musculoskeletal:  Positive for joint pain.  Negative for myalgias.   Neurological:  Negative for sensory change, speech change, focal weakness and headaches.      PAST HISTORY:     Past Medical History:   Diagnosis Date    Obesity (BMI 30-39.9) 2016    Septic joint of left knee joint        Past Surgical History:   Procedure Laterality Date    CORONARY ANGIOGRAPHY N/A 5/3/2019    Procedure: ANGIOGRAM, CORONARY ARTERY;  Surgeon: Joey Carter MD;  Location: [facility] CATH LAB;  Service: Cardiology;  Laterality: N/A;    KNEE ARTHROSCOPY Left     LEFT HEART CATHETERIZATION Right 5/3/2019    Procedure: Left heart cath;  Surgeon: Joey Carter MD;  Location: [facility] CATH LAB;  Service: Cardiology;  Laterality: Right;       Family History   Problem Relation Age of Onset    Heart attack Father 53    Heart disease Paternal Uncle         coronary stents    Coronary artery disease Paternal Grandfather         CABG and multiple stents    Hypertension Maternal Uncle        Social History     Socioeconomic History    Marital status:    Tobacco Use    Smoking status: Former     Types: Cigarettes    Smokeless tobacco: Never    Tobacco comments:     quit @ age 32; smoked x 10 years   Substance and Sexual Activity    Alcohol use: Yes     Comment: weekends    Drug use: No    Sexual activity: Yes     Partners: Female     Birth control/protection: I.U.D.   Social History Narrative    Engaged to Arabella;  daughter Bert    Has 1 daughter from previous marriage       MEDICATIONS & ALLERGIES:     Current Outpatient Medications on File Prior to Visit   Medication Sig    [DISCONTINUED] ibuprofen (ADVIL,MOTRIN) 600 MG tablet Take 1 tablet (600 mg total) by mouth every 6 (six) hours as needed for Pain. (Patient not taking: Reported on 2023)    [DISCONTINUED] promethazine-dextromethorphan (PROMETHAZINE-DM) 6.25-15 mg/5 mL Syrp Take 5 mLs by mouth every 6 (six) hours as needed (cough).    [DISCONTINUED] promethazine-dextromethorphan (PROMETHAZINE-DM) 6.25-15 mg/5  "mL Syrp Take 5 mLs by mouth every 6 (six) hours as needed (cough).     No current facility-administered medications on file prior to visit.       Review of patient's allergies indicates:  No Known Allergies    OBJECTIVE:     Vital Signs:  Vitals:    01/20/23 1322   BP: 128/72   BP Location: Right arm   Patient Position: Sitting   BP Method: Large (Manual)   Pulse: 76   Resp: 18   Temp: 98 °F (36.7 °C)   SpO2: 97%   Weight: (!) 161.5 kg (356 lb 0.7 oz)   Height: 6' 1" (1.854 m)         Body mass index is 46.97 kg/m².     Physical Exam:  Physical Exam  Vitals and nursing note reviewed.   Constitutional:       General: He is not in acute distress.     Appearance: Normal appearance. He is not ill-appearing, toxic-appearing or diaphoretic.   HENT:      Head: Normocephalic and atraumatic.   Eyes:      General: No scleral icterus.     Conjunctiva/sclera: Conjunctivae normal.   Pulmonary:      Effort: Pulmonary effort is normal.   Musculoskeletal:         General: No tenderness. Normal range of motion.      Right lower leg: Edema present.      Left lower leg: Edema present.      Comments: Chronic LE venous stasis skin changes  No pitting edema   Skin:     General: Skin is warm and dry.   Neurological:      Mental Status: He is alert and oriented to person, place, and time.   Psychiatric:         Mood and Affect: Mood and affect normal.         Behavior: Behavior normal.          Laboratory  Lab Results   Component Value Date    WBC 4.80 01/13/2023    HGB 16.1 01/13/2023    HCT 48.3 01/13/2023    MCV 93 01/13/2023     01/13/2023     Lab Results   Component Value Date     (H) 01/13/2023     01/13/2023    K 4.6 01/13/2023     01/13/2023    CO2 23 01/13/2023    BUN 12 01/13/2023    CREATININE 0.9 01/13/2023    CALCIUM 9.2 01/13/2023    MG 1.9 05/03/2019     No results found for: INR, PROTIME  Lab Results   Component Value Date    HGBA1C 5.6 01/13/2023           ASSESSMENT & PLAN:   Mr. Fitz Rizvint "  is a 47 y.o. male who was seen today in clinic for est care.     1. Gout, unspecified cause, unspecified chronicity, unspecified site  8. Kidney stone  -     allopurinoL (ZYLOPRIM) 100 MG tablet; Take 1 tablet (100 mg total) by mouth once daily.  Dispense: 30 tablet; Refill: 0  -   discuss possibility of allopurinol causing gout flare.  If any signs of pain occur, let me know, can call in colchicine +/- steroids.    2. Essential hypertension  -well-controlled today, not on any meds.    3. Venous stasis  4. Claudication  -     Ankle Brachial Indices (PAUL); Future    5. Former smoker  -     Ankle Brachial Indices (PAUL); Future    6. CASANDRA (obstructive sleep apnea)  -continue using CPAP.    7. Hyperlipidemia, unspecified hyperlipidemia type   -long discussion today regarding risks versus benefits of starting cholesterol medication.  Although his ASCVD percentage is only 2%, he is a former smoker, occasional cigar smoker, has a family history of high cholesterol/cardiac events, and has elevated triglycerides.  -at this time he is making big life changes in terms of diet and exercise.  He would like to try lifestyle changes first.  If in 3 months levels are still high, he is open to starting a low dose of cholesterol medication.      9. Vitamin D deficiency  -recommend 1000 IU vitamin-D daily    10. Colon cancer screening  -     Ambulatory referral/consult to Endo Procedure ; Future; Expected date: 01/21/2023    11. Abnormal CXR  -     X-Ray Chest PA And Lateral; Future; Expected date: 01/20/2023         Belia Laws MD  Internal Medicine     Established. Time spent in the evaluation and management of this patient exceeded 60 min and greater than 50% of this time was in face-to-face time with the patient.    Total time including the following:  -preparing to see the patient (e.g., obtaining and/or reviewing old records such as old primary care notes, specialists notes, hospital notes, review of laboratory  tests, radiographic and/or cardiology studies)  -orders which can include medications, laboratory studies, radiographic studies, procedures, referrals etcetera   --communicating with the patient and/or family member/caregiver regarding a detailed explanation of the treatment/care plan  -arranging possible referrals and follow-up plan  -documentation of the visit in the electronic health record    Est  Time spent in the evaluation and management of this patient exceeded 40min and greater than 50% of this time was in face-to-face time with the patient.    Total time including the following:  -preparing to see the patient (e.g., obtaining and/or reviewing old records such as old primary care notes, specialists notes, hospital notes, review of laboratory tests, radiographic and/or cardiology studies)  -orders which can include medications, laboratory studies, radiographic studies, procedures, referrals etcetera   --communicating with the patient and/or family member/caregiver regarding a detailed explanation of the treatment/care plan  -arranging possible referrals and follow-up plan  -documentation of the visit in the electronic health record

## 2023-01-23 ENCOUNTER — PATIENT MESSAGE (OUTPATIENT)
Dept: PRIMARY CARE CLINIC | Facility: CLINIC | Age: 48
End: 2023-01-23
Payer: COMMERCIAL

## 2023-01-25 ENCOUNTER — HOSPITAL ENCOUNTER (OUTPATIENT)
Dept: RADIOLOGY | Facility: OTHER | Age: 48
Discharge: HOME | End: 2023-01-25
Attending: STUDENT IN AN ORGANIZED HEALTH CARE EDUCATION/TRAINING PROGRAM
Payer: COMMERCIAL

## 2023-01-25 DIAGNOSIS — Z87.891 FORMER SMOKER: ICD-10-CM

## 2023-01-25 DIAGNOSIS — R93.89 ABNORMAL CXR: ICD-10-CM

## 2023-01-25 DIAGNOSIS — I73.9 CLAUDICATION: ICD-10-CM

## 2023-01-25 PROCEDURE — 71046 XR CHEST PA AND LATERAL: ICD-10-PCS | Mod: 26,,, | Performed by: INTERNAL MEDICINE

## 2023-01-25 PROCEDURE — 93922 UPR/L XTREMITY ART 2 LEVELS: CPT | Mod: TC

## 2023-01-25 PROCEDURE — 93922 UPR/L XTREMITY ART 2 LEVELS: CPT | Mod: 26,,, | Performed by: INTERNAL MEDICINE

## 2023-01-25 PROCEDURE — 71046 X-RAY EXAM CHEST 2 VIEWS: CPT | Mod: TC,FY

## 2023-01-25 PROCEDURE — 93922 US ANKLE/BRACH INDICES EXT LTD W/O STR 1-2 LEVELS: ICD-10-PCS | Mod: 26,,, | Performed by: INTERNAL MEDICINE

## 2023-01-25 PROCEDURE — 71046 X-RAY EXAM CHEST 2 VIEWS: CPT | Mod: 26,,, | Performed by: INTERNAL MEDICINE

## 2023-01-26 DIAGNOSIS — M10.29 ACUTE DRUG-INDUCED GOUT OF MULTIPLE SITES: Primary | ICD-10-CM

## 2023-01-26 DIAGNOSIS — M10.9 GOUT, UNSPECIFIED CAUSE, UNSPECIFIED CHRONICITY, UNSPECIFIED SITE: ICD-10-CM

## 2023-01-26 RX ORDER — COLCHICINE 0.6 MG/1
TABLET ORAL
Qty: 92 TABLET | Refills: 0 | Status: SHIPPED | OUTPATIENT
Start: 2023-01-26 | End: 2023-07-24

## 2023-01-26 RX ORDER — PREDNISONE 20 MG/1
20 TABLET ORAL DAILY
Qty: 5 TABLET | Refills: 0 | Status: SHIPPED | OUTPATIENT
Start: 2023-01-26 | End: 2023-01-31

## 2023-01-26 RX ORDER — MELOXICAM 7.5 MG/1
7.5 TABLET ORAL DAILY
Qty: 5 TABLET | Refills: 0 | Status: SHIPPED | OUTPATIENT
Start: 2023-01-26 | End: 2023-01-31

## 2023-02-06 ENCOUNTER — PATIENT OUTREACH (OUTPATIENT)
Dept: ADMINISTRATIVE | Facility: HOSPITAL | Age: 48
End: 2023-02-06
Payer: COMMERCIAL

## 2023-02-06 NOTE — PROGRESS NOTES
Health Maintenance Due   Topic Date Due    COVID-19 Vaccine (3 - Booster for Pfizer series) 10/20/2021    Influenza Vaccine (1) 09/01/2022        Chart review done.   HM updated.   Immunizations reviewed & updated.   Care Everywhere updated.

## 2023-02-07 ENCOUNTER — PATIENT MESSAGE (OUTPATIENT)
Dept: PRIMARY CARE CLINIC | Facility: CLINIC | Age: 48
End: 2023-02-07
Payer: COMMERCIAL

## 2023-02-15 NOTE — Clinical Note
Please schedule LEYDI in 2 weeks. Thanks! Lip Wedge Excision Repair Text: Given the location of the defect and the proximity to free margins a full thickness wedge repair was deemed most appropriate. Using a sterile surgical marker, the appropriate repair was drawn incorporating the defect and placing the expected incisions perpendicular to the vermilion border.  The vermilion border was also meticulously outlined to ensure appropriate reapproximation during the repair.  The area thus outlined was incised through and through with a #15 scalpel blade.  The muscularis and dermis were reaproximated with deep sutures following hemostasis. Care was taken to realign the vermilion border before proceeding with the superficial closure.  Once the vermilion was realigned the superfical and mucosal closure was finished.

## 2023-02-24 ENCOUNTER — PATIENT MESSAGE (OUTPATIENT)
Dept: RESEARCH | Facility: HOSPITAL | Age: 48
End: 2023-02-24
Payer: COMMERCIAL

## 2023-03-12 ENCOUNTER — PATIENT MESSAGE (OUTPATIENT)
Dept: PRIMARY CARE CLINIC | Facility: CLINIC | Age: 48
End: 2023-03-12
Payer: COMMERCIAL

## 2023-03-13 DIAGNOSIS — M10.9 GOUT, UNSPECIFIED CAUSE, UNSPECIFIED CHRONICITY, UNSPECIFIED SITE: Primary | ICD-10-CM

## 2023-03-13 RX ORDER — ALLOPURINOL 100 MG/1
100 TABLET ORAL DAILY
Qty: 30 TABLET | Refills: 0 | Status: SHIPPED | OUTPATIENT
Start: 2023-03-13 | End: 2023-03-21 | Stop reason: SDUPTHER

## 2023-03-16 NOTE — TELEPHONE ENCOUNTER
----- Message from Belia Laws MD sent at 1/20/2023  4:05 PM CST -----  Can we him know he has labs ordered in a month.  This is to check on everything after starting the allopurinol.    Thanks so much!    
Spoke with patient, scheduled for labs 02/22/2023 at 8:30 AM at [facility].  
16-Mar-2023 16:12

## 2023-03-21 DIAGNOSIS — M10.9 GOUT, UNSPECIFIED CAUSE, UNSPECIFIED CHRONICITY, UNSPECIFIED SITE: ICD-10-CM

## 2023-03-21 RX ORDER — ALLOPURINOL 100 MG/1
100 TABLET ORAL DAILY
Qty: 30 TABLET | Refills: 0 | Status: SHIPPED | OUTPATIENT
Start: 2023-03-21 | End: 2023-04-17

## 2023-04-03 ENCOUNTER — CLINICAL SUPPORT (OUTPATIENT)
Dept: ENDOSCOPY | Facility: HOSPITAL | Age: 48
End: 2023-04-03
Payer: COMMERCIAL

## 2023-04-03 VITALS — HEIGHT: 73 IN | WEIGHT: 315 LBS | BODY MASS INDEX: 41.75 KG/M2

## 2023-04-03 DIAGNOSIS — Z12.11 SPECIAL SCREENING FOR MALIGNANT NEOPLASMS, COLON: Primary | ICD-10-CM

## 2023-04-03 DIAGNOSIS — Z12.11 COLON CANCER SCREENING: ICD-10-CM

## 2023-04-03 NOTE — PLAN OF CARE
Endoscopy procedure scheduled on 4/12/23, prep instructions reviewed, Pt verbalized understanding.

## 2023-04-12 ENCOUNTER — HOSPITAL ENCOUNTER (OUTPATIENT)
Facility: HOSPITAL | Age: 48
Discharge: HOME | End: 2023-04-12
Attending: STUDENT IN AN ORGANIZED HEALTH CARE EDUCATION/TRAINING PROGRAM | Admitting: STUDENT IN AN ORGANIZED HEALTH CARE EDUCATION/TRAINING PROGRAM
Payer: COMMERCIAL

## 2023-04-12 ENCOUNTER — ANESTHESIA (OUTPATIENT)
Dept: ENDOSCOPY | Facility: HOSPITAL | Age: 48
End: 2023-04-12
Payer: COMMERCIAL

## 2023-04-12 ENCOUNTER — ANESTHESIA EVENT (OUTPATIENT)
Dept: ENDOSCOPY | Facility: HOSPITAL | Age: 48
End: 2023-04-12
Payer: COMMERCIAL

## 2023-04-12 VITALS
OXYGEN SATURATION: 97 % | SYSTOLIC BLOOD PRESSURE: 131 MMHG | RESPIRATION RATE: 16 BRPM | HEIGHT: 73 IN | DIASTOLIC BLOOD PRESSURE: 85 MMHG | HEART RATE: 57 BPM | WEIGHT: 315 LBS | BODY MASS INDEX: 41.75 KG/M2 | TEMPERATURE: 98 F

## 2023-04-12 DIAGNOSIS — Z12.11 ENCOUNTER FOR SCREENING COLONOSCOPY: Primary | ICD-10-CM

## 2023-04-12 PROCEDURE — 45380 COLONOSCOPY AND BIOPSY: CPT | Mod: 33,59,, | Performed by: STUDENT IN AN ORGANIZED HEALTH CARE EDUCATION/TRAINING PROGRAM

## 2023-04-12 PROCEDURE — 37000008 HC ANESTHESIA 1ST 15 MINUTES: Performed by: STUDENT IN AN ORGANIZED HEALTH CARE EDUCATION/TRAINING PROGRAM

## 2023-04-12 PROCEDURE — 88305 TISSUE EXAM BY PATHOLOGIST: CPT | Performed by: PATHOLOGY

## 2023-04-12 PROCEDURE — 25000003 PHARM REV CODE 250: Performed by: STUDENT IN AN ORGANIZED HEALTH CARE EDUCATION/TRAINING PROGRAM

## 2023-04-12 PROCEDURE — 45380 COLONOSCOPY AND BIOPSY: CPT | Mod: PT,59 | Performed by: STUDENT IN AN ORGANIZED HEALTH CARE EDUCATION/TRAINING PROGRAM

## 2023-04-12 PROCEDURE — 88305 TISSUE EXAM BY PATHOLOGIST: ICD-10-PCS | Mod: 26,,, | Performed by: PATHOLOGY

## 2023-04-12 PROCEDURE — 88305 TISSUE EXAM BY PATHOLOGIST: CPT | Mod: 26,,, | Performed by: PATHOLOGY

## 2023-04-12 PROCEDURE — 25000003 PHARM REV CODE 250: Performed by: NURSE ANESTHETIST, CERTIFIED REGISTERED

## 2023-04-12 PROCEDURE — 45385 PR COLONOSCOPY,REMV LESN,SNARE: ICD-10-PCS | Mod: 33,,, | Performed by: STUDENT IN AN ORGANIZED HEALTH CARE EDUCATION/TRAINING PROGRAM

## 2023-04-12 PROCEDURE — E9220 PRA ENDO ANESTHESIA: ICD-10-PCS | Mod: 33,,, | Performed by: NURSE ANESTHETIST, CERTIFIED REGISTERED

## 2023-04-12 PROCEDURE — E9220 PRA ENDO ANESTHESIA: HCPCS | Mod: 33,,, | Performed by: NURSE ANESTHETIST, CERTIFIED REGISTERED

## 2023-04-12 PROCEDURE — 45380 PR COLONOSCOPY,BIOPSY: ICD-10-PCS | Mod: 33,59,, | Performed by: STUDENT IN AN ORGANIZED HEALTH CARE EDUCATION/TRAINING PROGRAM

## 2023-04-12 PROCEDURE — 63600175 PHARM REV CODE 636 W HCPCS: Performed by: NURSE ANESTHETIST, CERTIFIED REGISTERED

## 2023-04-12 PROCEDURE — 45385 COLONOSCOPY W/LESION REMOVAL: CPT | Mod: 33,,, | Performed by: STUDENT IN AN ORGANIZED HEALTH CARE EDUCATION/TRAINING PROGRAM

## 2023-04-12 PROCEDURE — 37000009 HC ANESTHESIA EA ADD 15 MINS: Performed by: STUDENT IN AN ORGANIZED HEALTH CARE EDUCATION/TRAINING PROGRAM

## 2023-04-12 PROCEDURE — 27201012 HC FORCEPS, HOT/COLD, DISP: Performed by: STUDENT IN AN ORGANIZED HEALTH CARE EDUCATION/TRAINING PROGRAM

## 2023-04-12 PROCEDURE — 45385 COLONOSCOPY W/LESION REMOVAL: CPT | Mod: PT | Performed by: STUDENT IN AN ORGANIZED HEALTH CARE EDUCATION/TRAINING PROGRAM

## 2023-04-12 RX ORDER — SODIUM CHLORIDE 9 MG/ML
INJECTION, SOLUTION INTRAVENOUS CONTINUOUS
Status: DISCONTINUED | OUTPATIENT
Start: 2023-04-12 | End: 2023-04-12 | Stop reason: HOSPADM

## 2023-04-12 RX ORDER — LIDOCAINE HYDROCHLORIDE 20 MG/ML
INJECTION, SOLUTION EPIDURAL; INFILTRATION; INTRACAUDAL; PERINEURAL
Status: DISCONTINUED | OUTPATIENT
Start: 2023-04-12 | End: 2023-04-12

## 2023-04-12 RX ORDER — PROPOFOL 10 MG/ML
VIAL (ML) INTRAVENOUS
Status: DISCONTINUED | OUTPATIENT
Start: 2023-04-12 | End: 2023-04-12

## 2023-04-12 RX ORDER — PROPOFOL 10 MG/ML
VIAL (ML) INTRAVENOUS CONTINUOUS PRN
Status: DISCONTINUED | OUTPATIENT
Start: 2023-04-12 | End: 2023-04-12

## 2023-04-12 RX ADMIN — PROPOFOL 100 MG: 10 INJECTION, EMULSION INTRAVENOUS at 08:04

## 2023-04-12 RX ADMIN — LIDOCAINE HYDROCHLORIDE 100 MG: 20 INJECTION, SOLUTION EPIDURAL; INFILTRATION; INTRACAUDAL; PERINEURAL at 08:04

## 2023-04-12 RX ADMIN — Medication 180 MCG/KG/MIN: at 08:04

## 2023-04-12 RX ADMIN — SODIUM CHLORIDE: 0.9 INJECTION, SOLUTION INTRAVENOUS at 08:04

## 2023-04-12 RX ADMIN — GLYCOPYRROLATE 0.2 MG: 0.2 INJECTION, SOLUTION INTRAMUSCULAR; INTRAVENOUS at 08:04

## 2023-04-12 RX ADMIN — SODIUM CHLORIDE: 9 INJECTION, SOLUTION INTRAVENOUS at 08:04

## 2023-04-12 NOTE — ANESTHESIA POSTPROCEDURE EVALUATION
Anesthesia Post Evaluation    Patient: Fitz Venegas JrElisabet    Procedure(s) Performed: Procedure(s) (LRB):  COLONOSCOPY (N/A)    Final Anesthesia Type: general      Patient location during evaluation: GI PACU  Patient participation: Yes- Able to Participate  Level of consciousness: awake and alert  Post-procedure vital signs: reviewed and stable  Pain management: adequate  Airway patency: patent  CASANDRA mitigation strategies: Multimodal analgesia  PONV status at discharge: No PONV  Anesthetic complications: no      Cardiovascular status: stable  Respiratory status: unassisted and spontaneous ventilation  Hydration status: euvolemic  Follow-up not needed.          Vitals Value Taken Time   /81 04/12/23 0945   Temp 36.7 °C (98.1 °F) 04/12/23 0938   Pulse 64 04/12/23 0945   Resp 16 04/12/23 0945   SpO2 96 % 04/12/23 0945         No case tracking events are documented in the log.      Pain/Angie Score: Angie Score: 10 (4/12/2023  9:27 AM)

## 2023-04-12 NOTE — PROVATION PATIENT INSTRUCTIONS
Discharge Summary/Instructions after an Endoscopic Procedure  Patient Name: Fitz Venegas  Patient MRN: [id number]  Patient YOB: 1975  Wednesday, April 12, 2023  Fitz Lucia MD  Dear patient,  As a result of recent federal legislation (The Federal Cures Act), you may   receive lab or pathology results from your procedure in your [facility]   account before your physician is able to contact you. Your physician or   their representative will relay the results to you with their   recommendations at their soonest availability.  Thank you,  RESTRICTIONS:  During your procedure today, you received medications for sedation.  These   medications may affect your judgment, balance and coordination.  Therefore,   for 24 hours, you have the following restrictions:   - DO NOT drive a car, operate machinery, make legal/financial decisions,   sign important papers or drink alcohol.    ACTIVITY:  Today: no heavy lifting, straining or running due to procedural   sedation/anesthesia.  The following day: return to full activity including work.  DIET:  Eat and drink normally unless instructed otherwise.     TREATMENT FOR COMMON SIDE EFFECTS:  - Mild abdominal pain, nausea, belching, bloating or excessive gas:  rest,   eat lightly and use a heating pad.  - Sore Throat: treat with throat lozenges and/or gargle with warm salt   water.  - Because air was used during the procedure, expelling large amounts of air   from your rectum or belching is normal.  - If a bowel prep was taken, you may not have a bowel movement for 1-3 days.    This is normal.  SYMPTOMS TO WATCH FOR AND REPORT TO YOUR PHYSICIAN:  1. Abdominal pain or bloating, other than gas cramps.  2. Chest pain.  3. Back pain.  4. Signs of infection such as: chills or fever occurring within 24 hours   after the procedure.  5. Rectal bleeding, which would show as bright red, maroon, or black stools.   (A tablespoon of blood from the rectum is not serious, especially  if   hemorrhoids are present.)  6. Vomiting.  7. Weakness or dizziness.  GO DIRECTLY TO THE NEAREST EMERGENCY ROOM IF YOU HAVE ANY OF THE FOLLOWING:      Difficulty breathing              Chills and/or fever over 101 F   Persistent vomiting and/or vomiting blood   Severe abdominal pain   Severe chest pain   Black, tarry stools   Bleeding- more than one tablespoon   Any other symptom or condition that you feel may need urgent attention  Your doctor recommends these additional instructions:  If any biopsies were taken, your doctors clinic will contact you in 1 to 2   weeks with any results.  - The patient will be observed post-procedure, until all discharge criteria   are met.   - Discharge patient to home (via wheelchair).   - Resume previous diet.   - Continue present medications.   - Patient has a contact number available for emergencies.  The signs and   symptoms of potential delayed complications were discussed with the   patient.  Return to normal activities tomorrow.  Written discharge   instructions were provided to the patient.   - Repeat colonoscopy in 3 years for surveillance.  For questions, problems or results please call your physician - Fitz Lucia MD at Work:  [phone].  [facility], EMERGENCY ROOM PHONE NUMBER: [phone]  IF A COMPLICATION OR EMERGENCY SITUATION ARISES AND YOU ARE UNABLE TO REACH   YOUR PHYSICIAN - GO DIRECTLY TO THE EMERGENCY ROOM.  MD Fitz Gatica MD  4/12/2023 9:28:57 AM  This report has been verified and signed electronically.  Dear patient,  As a result of recent federal legislation (The Federal Cures Act), you may   receive lab or pathology results from your procedure in your [facility]   account before your physician is able to contact you. Your physician or   their representative will relay the results to you with their   recommendations at their soonest availability.  Thank you,  PROVATION

## 2023-04-12 NOTE — ANESTHESIA PREPROCEDURE EVALUATION
04/12/2023  Fitz Venegas Jr. is a 48 y.o., male.    Pre-operative evaluation for Procedure(s) (LRB):  COLONOSCOPY (N/A)    Fitz Venegas Jr. is a 48 y.o. male     Patient Active Problem List   Diagnosis    Morbid obesity with BMI of 40.0-44.9, adult    Essential hypertension    Hyperlipidemia    Blood glucose elevated    CASANDRA (obstructive sleep apnea)    Claudication    Venous stasis    Closed fracture of nasal bones    Kidney stone    Former smoker    Gout    Vitamin D deficiency       Review of patient's allergies indicates:  No Known Allergies    No current facility-administered medications on file prior to encounter.     Current Outpatient Medications on File Prior to Encounter   Medication Sig Dispense Refill    allopurinoL (ZYLOPRIM) 100 MG tablet Take 1 tablet (100 mg total) by mouth once daily. 30 tablet 0    azelastine (ASTELIN) 137 mcg (0.1 %) nasal spray 1 spray (137 mcg total) by Nasal route 2 (two) times daily. 30 mL 11    colchicine (COLCRYS) 0.6 mg tablet Take 2 tablets (1.2 mg total) by mouth once daily for 1 day, THEN 1 tablet (0.6 mg total) once daily. The 0.6 mg daily is to help prevent future flares while the allopurinol kicks in.. 92 tablet 0       Past Surgical History:   Procedure Laterality Date    CORONARY ANGIOGRAPHY N/A 5/3/2019    Procedure: ANGIOGRAM, CORONARY ARTERY;  Surgeon: Joey Carter MD;  Location: [facility] CATH LAB;  Service: Cardiology;  Laterality: N/A;    KNEE ARTHROSCOPY Left     LEFT HEART CATHETERIZATION Right 5/3/2019    Procedure: Left heart cath;  Surgeon: Joey Carter MD;  Location: [facility] CATH LAB;  Service: Cardiology;  Laterality: Right;       Social History     Socioeconomic History    Marital status:    Tobacco Use    Smoking status: Former     Types: Cigarettes    Smokeless tobacco: Never    Tobacco comments:     quit @ age  32; smoked x 10 years   Substance and Sexual Activity    Alcohol use: Yes     Comment: weekends    Drug use: No    Sexual activity: Yes     Partners: Female     Birth control/protection: I.U.D.   Social History Narrative    Engaged to Arabella;  daughter Bert    Has 1 daughter from previous marriage         CBC: No results for input(s): WBC, RBC, HGB, HCT, PLT, MCV, MCH, MCHC in the last 72 hours.    CMP: No results for input(s): NA, K, CL, CO2, BUN, CREATININE, GLU, MG, PHOS, CALCIUM, ALBUMIN, PROT, ALKPHOS, ALT, AST, BILITOT in the last 72 hours.    INR  No results for input(s): PT, INR, PROTIME, APTT in the last 72 hours.        Diagnostic Studies:      E[facility] Echo:  No results found for this or any previous visit.        Pre-op Assessment    I have reviewed the Patient Summary Reports.     I have reviewed the Nursing Notes. I have reviewed the NPO Status.   I have reviewed the Medications.     Review of Systems  Social:  Former Smoker    Cardiovascular:   Exercise tolerance: good Hypertension hyperlipidemia ECG has been reviewed. May 2019 Echo and LHC reviewed   Pulmonary:   Sleep Apnea, CPAP    Renal/:   Chronic Renal Disease renal calculi    Endocrine:  Morbid Obesity / BMI > 40      Physical Exam  General: Well nourished, Cooperative and Alert    Airway:  Mallampati: IV   Mouth Opening: Normal  TM Distance: Normal  Tongue: Normal  Neck ROM: Normal ROM    Dental:  Partial Dentures    Chest/Lungs:  Normal Respiratory Rate    Heart:  Rate: Normal        Anesthesia Plan  Type of Anesthesia, risks & benefits discussed:    Anesthesia Type: Gen ETT, Gen Natural Airway  Intra-op Monitoring Plan: Standard ASA Monitors  Post Op Pain Control Plan: multimodal analgesia and IV/PO Opioids PRN  Induction:  IV  Airway Plan: Direct, Post-Induction  Informed Consent: Informed consent signed with the Patient and all parties understand the risks and agree with anesthesia plan.  All questions answered.   ASA  Score: 3  Day of Surgery Review of History & Physical: H&P Update referred to the surgeon/provider.    Ready For Surgery From Anesthesia Perspective.     .

## 2023-04-12 NOTE — H&P
Short Stay Endoscopy History and Physical    PCP - Belia Laws MD    Procedure - Colonoscopy  ASA - per anesthesia  Mallampati - per anesthesia  History of Anesthesia problems - no  Family history Anesthesia problems - no   Plan of anesthesia - per anesthesia    HPI:  This is a 48 y.o. male here for evaluation of : asymptomatic screening exam      ROS:  Constitutional: No fevers, chills, No weight loss  CV: No chest pain  Pulm: No cough, No shortness of breath  GI: see HPI  Derm: No rash    Medical History:  has a past medical history of Obesity (BMI 30-39.9) (04/27/2016), CASANDRA (obstructive sleep apnea), and Septic joint of left knee joint (2011).    Surgical History:  has a past surgical history that includes Knee arthroscopy (Left); Coronary angiography (N/A, 5/3/2019); and Left heart catheterization (Right, 5/3/2019).    Family History: family history includes Coronary artery disease in his paternal grandfather; Heart attack (age of onset: 53) in his father; Heart disease in his paternal uncle; Hypertension in his maternal uncle.. Otherwise no colon cancer, inflammatory bowel disease, or GI malignancies.    Social History:  reports that he has quit smoking. His smoking use included cigarettes. He has never used smokeless tobacco. He reports current alcohol use of about 2.0 standard drinks per week. He reports that he does not use drugs.    Review of patient's allergies indicates:  No Known Allergies    Medications:   Medications Prior to Admission   Medication Sig Dispense Refill Last Dose    allopurinoL (ZYLOPRIM) 100 MG tablet Take 1 tablet (100 mg total) by mouth once daily. 30 tablet 0 4/11/2023    azelastine (ASTELIN) 137 mcg (0.1 %) nasal spray 1 spray (137 mcg total) by Nasal route 2 (two) times daily. 30 mL 11     colchicine (COLCRYS) 0.6 mg tablet Take 2 tablets (1.2 mg total) by mouth once daily for 1 day, THEN 1 tablet (0.6 mg total) once daily. The 0.6 mg daily is to help prevent future flares  while the allopurinol kicks in.. 92 tablet 0          Physical Exam:    Vital Signs:   Vitals:    04/12/23 0824   BP: 126/71   Pulse: 66   Resp: 18   Temp: 98.2 °F (36.8 °C)       General Appearance: Well appearing in no acute distress  Eyes:    No scleral icterus  ENT: Neck supple, Lips, mucosa, and tongue normal; teeth and gums normal  Lungs: CTA bilaterally  Heart:  S1, S2 normal, no murmurs heard  Abdomen: Soft, non tender, non distended with positive bowel sounds. No hepatosplenomegaly, ascites, or mass.  Extremities: 2+ pulses, no clubbing, cyanosis or edema  Skin: No rash      Labs:  Lab Results   Component Value Date    WBC 4.80 01/13/2023    HGB 16.1 01/13/2023    HCT 48.3 01/13/2023     01/13/2023    CHOL 160 01/13/2023    TRIG 152 (H) 01/13/2023    HDL 35 (L) 01/13/2023    ALT 33 01/13/2023    AST 20 01/13/2023     01/13/2023    K 4.6 01/13/2023     01/13/2023    CREATININE 0.9 01/13/2023    BUN 12 01/13/2023    CO2 23 01/13/2023    TSH 2.330 01/13/2023    HGBA1C 5.6 01/13/2023       I have explained the risks and benefits of endoscopy procedures to the patient including but not limited to bleeding, perforation, infection, and death.  The patient was asked if they understand and allowed to ask any further questions to their satisfaction.    Fitz Lucia MD

## 2023-04-12 NOTE — TRANSFER OF CARE
"Anesthesia Transfer of Care Note    Patient: Fitz Venegas Jr.    Procedure(s) Performed: Procedure(s) (LRB):  COLONOSCOPY (N/A)    Patient location: PACU    Anesthesia Type: general    Transport from OR: Transported from OR on room air with adequate spontaneous ventilation    Post pain: adequate analgesia    Post assessment: no apparent anesthetic complications    Post vital signs: stable    Level of consciousness: awake    Nausea/Vomiting: no nausea/vomiting    Complications: none    Transfer of care protocol was followed      Last vitals:   Visit Vitals  /79(BP Location: Left arm, Patient Position: Lying)   Pulse 66   Temp 36.8 °C (98.2 °F) (Temporal)   Resp 18   Ht 6' 1" (1.854 m)   Wt (!) 149.7 kg (330 lb)   SpO2 96%   BMI 43.54 kg/m²     "

## 2023-04-17 DIAGNOSIS — M10.9 GOUT, UNSPECIFIED CAUSE, UNSPECIFIED CHRONICITY, UNSPECIFIED SITE: ICD-10-CM

## 2023-04-17 RX ORDER — ALLOPURINOL 100 MG/1
TABLET ORAL
Qty: 30 TABLET | Refills: 0 | Status: SHIPPED | OUTPATIENT
Start: 2023-04-17 | End: 2023-05-05 | Stop reason: SDUPTHER

## 2023-04-21 LAB
FINAL PATHOLOGIC DIAGNOSIS: NORMAL
GROSS: NORMAL
Lab: NORMAL

## 2023-05-05 ENCOUNTER — PATIENT MESSAGE (OUTPATIENT)
Dept: PRIMARY CARE CLINIC | Facility: CLINIC | Age: 48
End: 2023-05-05
Payer: COMMERCIAL

## 2023-05-05 DIAGNOSIS — M10.9 GOUT, UNSPECIFIED CAUSE, UNSPECIFIED CHRONICITY, UNSPECIFIED SITE: ICD-10-CM

## 2023-05-05 RX ORDER — ALLOPURINOL 100 MG/1
100 TABLET ORAL DAILY
Qty: 30 TABLET | Refills: 3 | Status: SHIPPED | OUTPATIENT
Start: 2023-05-05 | End: 2023-07-24 | Stop reason: SDUPTHER

## 2023-07-24 ENCOUNTER — OFFICE VISIT (OUTPATIENT)
Dept: PRIMARY CARE CLINIC | Facility: CLINIC | Age: 48
End: 2023-07-24
Payer: COMMERCIAL

## 2023-07-24 ENCOUNTER — LAB VISIT (OUTPATIENT)
Dept: LAB | Facility: HOSPITAL | Age: 48
End: 2023-07-24
Attending: STUDENT IN AN ORGANIZED HEALTH CARE EDUCATION/TRAINING PROGRAM
Payer: COMMERCIAL

## 2023-07-24 VITALS
SYSTOLIC BLOOD PRESSURE: 130 MMHG | WEIGHT: 315 LBS | DIASTOLIC BLOOD PRESSURE: 86 MMHG | HEART RATE: 71 BPM | HEIGHT: 73 IN | OXYGEN SATURATION: 99 % | BODY MASS INDEX: 41.75 KG/M2

## 2023-07-24 DIAGNOSIS — E78.5 HYPERLIPIDEMIA, UNSPECIFIED HYPERLIPIDEMIA TYPE: ICD-10-CM

## 2023-07-24 DIAGNOSIS — E55.9 VITAMIN D INSUFFICIENCY: ICD-10-CM

## 2023-07-24 DIAGNOSIS — G47.33 OSA (OBSTRUCTIVE SLEEP APNEA): ICD-10-CM

## 2023-07-24 DIAGNOSIS — M10.9 GOUT, UNSPECIFIED CAUSE, UNSPECIFIED CHRONICITY, UNSPECIFIED SITE: ICD-10-CM

## 2023-07-24 DIAGNOSIS — Z01.00 ROUTINE EYE EXAM: ICD-10-CM

## 2023-07-24 DIAGNOSIS — I10 ESSENTIAL HYPERTENSION: ICD-10-CM

## 2023-07-24 PROBLEM — I73.9 CLAUDICATION: Status: RESOLVED | Noted: 2023-01-12 | Resolved: 2023-07-24

## 2023-07-24 LAB
25(OH)D3+25(OH)D2 SERPL-MCNC: 38 NG/ML (ref 30–96)
ALBUMIN SERPL BCP-MCNC: 3.9 G/DL (ref 3.5–5.2)
ALP SERPL-CCNC: 94 U/L (ref 55–135)
ALT SERPL W/O P-5'-P-CCNC: 37 U/L (ref 10–44)
ANION GAP SERPL CALC-SCNC: 12 MMOL/L (ref 8–16)
AST SERPL-CCNC: 17 U/L (ref 10–40)
BASOPHILS # BLD AUTO: 0.05 K/UL (ref 0–0.2)
BASOPHILS NFR BLD: 0.9 % (ref 0–1.9)
BILIRUB SERPL-MCNC: 0.8 MG/DL (ref 0.1–1)
BUN SERPL-MCNC: 15 MG/DL (ref 6–20)
CALCIUM SERPL-MCNC: 9.2 MG/DL (ref 8.7–10.5)
CHLORIDE SERPL-SCNC: 105 MMOL/L (ref 95–110)
CO2 SERPL-SCNC: 20 MMOL/L (ref 23–29)
CREAT SERPL-MCNC: 0.9 MG/DL (ref 0.5–1.4)
DIFFERENTIAL METHOD: ABNORMAL
EOSINOPHIL # BLD AUTO: 0.1 K/UL (ref 0–0.5)
EOSINOPHIL NFR BLD: 2.2 % (ref 0–8)
ERYTHROCYTE [DISTWIDTH] IN BLOOD BY AUTOMATED COUNT: 11.8 % (ref 11.5–14.5)
EST. GFR  (NO RACE VARIABLE): >60 ML/MIN/1.73 M^2
ESTIMATED AVG GLUCOSE: 108 MG/DL (ref 68–131)
GLUCOSE SERPL-MCNC: 111 MG/DL (ref 70–110)
HBA1C MFR BLD: 5.4 % (ref 4–5.6)
HCT VFR BLD AUTO: 48.6 % (ref 40–54)
HGB BLD-MCNC: 16.6 G/DL (ref 14–18)
IMM GRANULOCYTES # BLD AUTO: 0.03 K/UL (ref 0–0.04)
IMM GRANULOCYTES NFR BLD AUTO: 0.6 % (ref 0–0.5)
LYMPHOCYTES # BLD AUTO: 2 K/UL (ref 1–4.8)
LYMPHOCYTES NFR BLD: 37.3 % (ref 18–48)
MCH RBC QN AUTO: 31.4 PG (ref 27–31)
MCHC RBC AUTO-ENTMCNC: 34.2 G/DL (ref 32–36)
MCV RBC AUTO: 92 FL (ref 82–98)
MONOCYTES # BLD AUTO: 0.5 K/UL (ref 0.3–1)
MONOCYTES NFR BLD: 10 % (ref 4–15)
NEUTROPHILS # BLD AUTO: 2.6 K/UL (ref 1.8–7.7)
NEUTROPHILS NFR BLD: 49 % (ref 38–73)
NRBC BLD-RTO: 0 /100 WBC
PLATELET # BLD AUTO: 235 K/UL (ref 150–450)
PMV BLD AUTO: 9.9 FL (ref 9.2–12.9)
POTASSIUM SERPL-SCNC: 4.3 MMOL/L (ref 3.5–5.1)
PROT SERPL-MCNC: 7.5 G/DL (ref 6–8.4)
RBC # BLD AUTO: 5.28 M/UL (ref 4.6–6.2)
SODIUM SERPL-SCNC: 137 MMOL/L (ref 136–145)
TSH SERPL DL<=0.005 MIU/L-ACNC: 2.77 UIU/ML (ref 0.4–4)
URATE SERPL-MCNC: 7.8 MG/DL (ref 3.4–7)
WBC # BLD AUTO: 5.39 K/UL (ref 3.9–12.7)

## 2023-07-24 PROCEDURE — 3079F PR MOST RECENT DIASTOLIC BLOOD PRESSURE 80-89 MM HG: ICD-10-PCS | Mod: CPTII,S$GLB,, | Performed by: STUDENT IN AN ORGANIZED HEALTH CARE EDUCATION/TRAINING PROGRAM

## 2023-07-24 PROCEDURE — 84550 ASSAY OF BLOOD/URIC ACID: CPT | Performed by: STUDENT IN AN ORGANIZED HEALTH CARE EDUCATION/TRAINING PROGRAM

## 2023-07-24 PROCEDURE — 99999 PR PBB SHADOW E&M-EST. PATIENT-LVL IV: CPT | Mod: PBBFAC,,, | Performed by: STUDENT IN AN ORGANIZED HEALTH CARE EDUCATION/TRAINING PROGRAM

## 2023-07-24 PROCEDURE — 3079F DIAST BP 80-89 MM HG: CPT | Mod: CPTII,S$GLB,, | Performed by: STUDENT IN AN ORGANIZED HEALTH CARE EDUCATION/TRAINING PROGRAM

## 2023-07-24 PROCEDURE — 3075F PR MOST RECENT SYSTOLIC BLOOD PRESS GE 130-139MM HG: ICD-10-PCS | Mod: CPTII,S$GLB,, | Performed by: STUDENT IN AN ORGANIZED HEALTH CARE EDUCATION/TRAINING PROGRAM

## 2023-07-24 PROCEDURE — 3044F HG A1C LEVEL LT 7.0%: CPT | Mod: CPTII,S$GLB,, | Performed by: STUDENT IN AN ORGANIZED HEALTH CARE EDUCATION/TRAINING PROGRAM

## 2023-07-24 PROCEDURE — 3044F PR MOST RECENT HEMOGLOBIN A1C LEVEL <7.0%: ICD-10-PCS | Mod: CPTII,S$GLB,, | Performed by: STUDENT IN AN ORGANIZED HEALTH CARE EDUCATION/TRAINING PROGRAM

## 2023-07-24 PROCEDURE — 83036 HEMOGLOBIN GLYCOSYLATED A1C: CPT | Performed by: STUDENT IN AN ORGANIZED HEALTH CARE EDUCATION/TRAINING PROGRAM

## 2023-07-24 PROCEDURE — 99214 PR OFFICE/OUTPT VISIT, EST, LEVL IV, 30-39 MIN: ICD-10-PCS | Mod: S$GLB,,, | Performed by: STUDENT IN AN ORGANIZED HEALTH CARE EDUCATION/TRAINING PROGRAM

## 2023-07-24 PROCEDURE — 99214 OFFICE O/P EST MOD 30 MIN: CPT | Mod: S$GLB,,, | Performed by: STUDENT IN AN ORGANIZED HEALTH CARE EDUCATION/TRAINING PROGRAM

## 2023-07-24 PROCEDURE — 1159F PR MEDICATION LIST DOCUMENTED IN MEDICAL RECORD: ICD-10-PCS | Mod: CPTII,S$GLB,, | Performed by: STUDENT IN AN ORGANIZED HEALTH CARE EDUCATION/TRAINING PROGRAM

## 2023-07-24 PROCEDURE — 80053 COMPREHEN METABOLIC PANEL: CPT | Performed by: STUDENT IN AN ORGANIZED HEALTH CARE EDUCATION/TRAINING PROGRAM

## 2023-07-24 PROCEDURE — 3008F PR BODY MASS INDEX (BMI) DOCUMENTED: ICD-10-PCS | Mod: CPTII,S$GLB,, | Performed by: STUDENT IN AN ORGANIZED HEALTH CARE EDUCATION/TRAINING PROGRAM

## 2023-07-24 PROCEDURE — 3075F SYST BP GE 130 - 139MM HG: CPT | Mod: CPTII,S$GLB,, | Performed by: STUDENT IN AN ORGANIZED HEALTH CARE EDUCATION/TRAINING PROGRAM

## 2023-07-24 PROCEDURE — 84443 ASSAY THYROID STIM HORMONE: CPT | Performed by: STUDENT IN AN ORGANIZED HEALTH CARE EDUCATION/TRAINING PROGRAM

## 2023-07-24 PROCEDURE — 1159F MED LIST DOCD IN RCRD: CPT | Mod: CPTII,S$GLB,, | Performed by: STUDENT IN AN ORGANIZED HEALTH CARE EDUCATION/TRAINING PROGRAM

## 2023-07-24 PROCEDURE — 85025 COMPLETE CBC W/AUTO DIFF WBC: CPT | Performed by: STUDENT IN AN ORGANIZED HEALTH CARE EDUCATION/TRAINING PROGRAM

## 2023-07-24 PROCEDURE — 99999 PR PBB SHADOW E&M-EST. PATIENT-LVL IV: ICD-10-PCS | Mod: PBBFAC,,, | Performed by: STUDENT IN AN ORGANIZED HEALTH CARE EDUCATION/TRAINING PROGRAM

## 2023-07-24 PROCEDURE — 3008F BODY MASS INDEX DOCD: CPT | Mod: CPTII,S$GLB,, | Performed by: STUDENT IN AN ORGANIZED HEALTH CARE EDUCATION/TRAINING PROGRAM

## 2023-07-24 PROCEDURE — 36415 COLL VENOUS BLD VENIPUNCTURE: CPT | Mod: PN | Performed by: STUDENT IN AN ORGANIZED HEALTH CARE EDUCATION/TRAINING PROGRAM

## 2023-07-24 PROCEDURE — 82306 VITAMIN D 25 HYDROXY: CPT | Performed by: STUDENT IN AN ORGANIZED HEALTH CARE EDUCATION/TRAINING PROGRAM

## 2023-07-24 RX ORDER — ALLOPURINOL 100 MG/1
100 TABLET ORAL DAILY
Qty: 90 TABLET | Refills: 3 | Status: SHIPPED | OUTPATIENT
Start: 2023-07-24

## 2023-07-24 NOTE — PROGRESS NOTES
"Fitz Venegas Jr.  1975        Subjective     Chief Complaint: Weight gain    History of Present Illness:  Mr. Fitz Venegas Jr. is a 48 y.o. male who presents to clinic for f/u/weight gain.    Has been feeling badly when restricts. Starts day with water and black coffee. Usually small omelette with spinach, broccoli. Before lunch starts feeling lethargic, woozy, dizzy. Gets a Sprite or some bread which improves symptoms. Sometimes causes headaches in afternoon. Checks BP when this happens and usually in 140s/80s. Has tried nuts in mid morning but still does not help. Only happens when he "diets."    This AM had 2 boiled eggs and berries.     If not watching his food intake usually starts with sausage biscuit, hash browns, sandwich. Does not get dizzy when eats like that.     Compliant with Cpap. Sleeping well.     Wt Readings from Last 7 Encounters:   07/24/23 (!) 162.5 kg (358 lb 4 oz)   04/12/23 (!) 149.7 kg (330 lb)   04/03/23 (!) 156.5 kg (345 lb)   01/20/23 (!) 161.5 kg (356 lb 0.7 oz)   01/12/23 (!) 161.8 kg (356 lb 11.3 oz)   01/04/23 136.1 kg (300 lb)   11/17/22 136.1 kg (300 lb)     Hs been doing well with gout. Ran out of Allopurinol.     Answers submitted by the patient for this visit:  Review of Systems Questionnaire (Submitted on 7/24/2023)  activity change: No  unexpected weight change: Yes  rhinorrhea: No  trouble swallowing: No  visual disturbance: No  chest tightness: No  polyuria: No  difficulty urinating: No  joint swelling: No  arthralgias: No  confusion: No  dysphoric mood: No      Review of Systems   Constitutional:  Positive for malaise/fatigue. Negative for chills, fever and weight loss.   HENT:  Negative for hearing loss.    Eyes:  Negative for discharge.   Respiratory:  Negative for wheezing.    Cardiovascular:  Negative for chest pain and palpitations.   Gastrointestinal:  Negative for blood in stool, constipation, diarrhea and vomiting.   Genitourinary:  Negative for hematuria " and urgency.   Musculoskeletal:  Negative for neck pain.   Neurological:  Negative for weakness and headaches.   Endo/Heme/Allergies:  Negative for polydipsia.      PAST HISTORY:     Past Medical History:   Diagnosis Date    Obesity (BMI 30-39.9) 2016    CASANDRA (obstructive sleep apnea)     Septic joint of left knee joint        Past Surgical History:   Procedure Laterality Date    COLONOSCOPY N/A 2023    Procedure: COLONOSCOPY;  Surgeon: Fitz Lucia MD;  Location: [facility] ENDO ([address] [place] [address]);  Service: Gastroenterology;  Laterality: N/A;  4/3 instructions to portal-st  precall done/arrival time of 830 confirmed/mleone lpn    CORONARY ANGIOGRAPHY N/A 5/3/2019    Procedure: ANGIOGRAM, CORONARY ARTERY;  Surgeon: Joey Carter MD;  Location: [facility] CATH LAB;  Service: Cardiology;  Laterality: N/A;    KNEE ARTHROSCOPY Left     LEFT HEART CATHETERIZATION Right 5/3/2019    Procedure: Left heart cath;  Surgeon: Joey Carter MD;  Location: [facility] CATH LAB;  Service: Cardiology;  Laterality: Right;       Family History   Problem Relation Age of Onset    Heart attack Father 53    Heart disease Paternal Uncle         coronary stents    Coronary artery disease Paternal Grandfather         CABG and multiple stents    Hypertension Maternal Uncle        Social History     Socioeconomic History    Marital status:    Tobacco Use    Smoking status: Former     Types: Cigarettes    Smokeless tobacco: Never    Tobacco comments:     quit @ age 32; smoked x 10 years   Substance and Sexual Activity    Alcohol use: Yes     Alcohol/week: 2.0 standard drinks     Types: 2 Shots of liquor per week     Comment: weekends    Drug use: No    Sexual activity: Yes     Partners: Female     Birth control/protection: I.U.D.   Social History Narrative    Engaged to Arabella;  daughter Bert    Has 1 daughter from previous marriage       MEDICATIONS & ALLERGIES:     Current Outpatient Medications on File Prior to Visit   Medication  "Sig    azelastine (ASTELIN) 137 mcg (0.1 %) nasal spray 1 spray (137 mcg total) by Nasal route 2 (two) times daily.    colchicine (COLCRYS) 0.6 mg tablet Take 2 tablets (1.2 mg total) by mouth once daily for 1 day, THEN 1 tablet (0.6 mg total) once daily. The 0.6 mg daily is to help prevent future flares while the allopurinol kicks in..    [DISCONTINUED] allopurinoL (ZYLOPRIM) 100 MG tablet Take 1 tablet (100 mg total) by mouth once daily.     No current facility-administered medications on file prior to visit.       Review of patient's allergies indicates:  No Known Allergies    OBJECTIVE:     Vital Signs:  Vitals:    07/24/23 0938   BP: 130/86   BP Location: Left arm   Patient Position: Sitting   BP Method: Large (Manual)   Pulse: 71   SpO2: 99%   Weight: (!) 162.5 kg (358 lb 4 oz)   Height: 6' 1" (1.854 m)       Body mass index is 47.27 kg/m².     Physical Exam:  Physical Exam  Vitals and nursing note reviewed.   Constitutional:       General: He is not in acute distress.     Appearance: Normal appearance. He is obese. He is not ill-appearing, toxic-appearing or diaphoretic.   HENT:      Head: Normocephalic and atraumatic.   Eyes:      General: No scleral icterus.        Right eye: No discharge.         Left eye: No discharge.      Conjunctiva/sclera: Conjunctivae normal.   Cardiovascular:      Rate and Rhythm: Normal rate and regular rhythm.      Pulses: Normal pulses.      Heart sounds: No murmur heard.  Pulmonary:      Effort: Pulmonary effort is normal. No respiratory distress.   Musculoskeletal:         General: No swelling or tenderness. Normal range of motion.      Right lower leg: No edema.      Left lower leg: No edema.   Skin:     General: Skin is warm and dry.   Neurological:      Mental Status: He is alert and oriented to person, place, and time. Mental status is at baseline.   Psychiatric:         Mood and Affect: Mood normal.         Behavior: Behavior normal.          Laboratory  Lab Results "   Component Value Date    WBC 4.80 01/13/2023    HGB 16.1 01/13/2023    HCT 48.3 01/13/2023    MCV 93 01/13/2023     01/13/2023     Lab Results   Component Value Date     (H) 01/13/2023     01/13/2023    K 4.6 01/13/2023     01/13/2023    CO2 23 01/13/2023    BUN 12 01/13/2023    CREATININE 0.9 01/13/2023    CALCIUM 9.2 01/13/2023    MG 1.9 05/03/2019     No results found for: INR, PROTIME  Lab Results   Component Value Date    HGBA1C 5.6 01/13/2023           Health Maintenance         Date Due Completion Date    COVID-19 Vaccine (3 - Pfizer series) 10/20/2021 8/25/2021    Influenza Vaccine (1) 09/01/2023 1/25/2022    Hemoglobin A1c (Prediabetes) 01/13/2024 1/13/2023    Colorectal Cancer Screening 04/12/2026 4/12/2023    TETANUS VACCINE 01/11/2027 1/11/2017    Lipid Panel 01/13/2028 1/13/2023                ASSESSMENT & PLAN:   Mr. Fitz Rizvilazarus Pereyra. is a 48 y.o. male who was seen today in clinic for weight gain. BMI now 47. Long discussion today regarding his prior attempts at weight loss with strict calorie restriction. Feel that when he restricts too much, not sustainable and causes causing fatigue/dizziness until he eats something with sugar/carbs. Would advise calorie counter joel (downloaded and set up together today) with focus on balanced meals. Adding complex carbs like whole grains, greek yogurt, fruit to avoid dizziness. Slow weight loss 1 lb per week. Consider GLP if A1c elevated.       1. BMI 45.0-49.9, adult  -     TSH; Future; Expected date: 07/24/2023  -     HEMOGLOBIN A1C; Future; Expected date: 07/24/2023  -     CBC Auto Differential; Future; Expected date: 07/24/2023  -     Comprehensive Metabolic Panel; Future; Expected date: 07/24/2023    2. CASANDRA (obstructive sleep apnea)  -     CBC Auto Differential; Future; Expected date: 07/24/2023    3. Hyperlipidemia, unspecified hyperlipidemia type  -     CBC Auto Differential; Future; Expected date: 07/24/2023  -     URIC ACID;  Future; Expected date: 07/24/2023  -     Comprehensive Metabolic Panel; Future; Expected date: 07/24/2023    4. Essential hypertension  -     CBC Auto Differential; Future; Expected date: 07/24/2023    5. Gout, unspecified cause, unspecified chronicity, unspecified site  -     allopurinoL (ZYLOPRIM) 100 MG tablet; Take 1 tablet (100 mg total) by mouth once daily.  Dispense: 90 tablet; Refill: 3  -     URIC ACID; Future; Expected date: 07/24/2023  -     Comprehensive Metabolic Panel; Future; Expected date: 07/24/2023    6. Routine eye exam  -     Ambulatory referral/consult to Optometry; Future; Expected date: 07/31/2023    7. Vitamin D insufficiency  -     Vitamin D; Future; Expected date: 07/24/2023           Belia Laws MD  Internal Medicine         Portions of this note may have been generated using voice recognition software.  Please excuse any spelling/grammatical errors. Occasional wrong-word or sound-a-like substitutions may have also occurred due to the inherent limitations of voice recognition software. Please read the chart carefully and recognize, using context, where substitutions have occurred.

## 2023-07-25 ENCOUNTER — PATIENT MESSAGE (OUTPATIENT)
Dept: PRIMARY CARE CLINIC | Facility: CLINIC | Age: 48
End: 2023-07-25
Payer: COMMERCIAL

## 2023-11-02 ENCOUNTER — PATIENT MESSAGE (OUTPATIENT)
Dept: PRIMARY CARE CLINIC | Facility: CLINIC | Age: 48
End: 2023-11-02
Payer: COMMERCIAL

## 2023-11-03 ENCOUNTER — HOSPITAL ENCOUNTER (EMERGENCY)
Facility: OTHER | Age: 48
Discharge: HOME | End: 2023-11-03
Attending: EMERGENCY MEDICINE
Payer: COMMERCIAL

## 2023-11-03 VITALS
SYSTOLIC BLOOD PRESSURE: 137 MMHG | RESPIRATION RATE: 18 BRPM | TEMPERATURE: 98 F | OXYGEN SATURATION: 98 % | DIASTOLIC BLOOD PRESSURE: 83 MMHG | WEIGHT: 315 LBS | HEIGHT: 74 IN | BODY MASS INDEX: 40.43 KG/M2 | HEART RATE: 66 BPM

## 2023-11-03 DIAGNOSIS — Z87.442 HISTORY OF KIDNEY STONES: ICD-10-CM

## 2023-11-03 DIAGNOSIS — N23 RENAL COLIC ON RIGHT SIDE: Primary | ICD-10-CM

## 2023-11-03 DIAGNOSIS — R31.29 MICROSCOPIC HEMATURIA: ICD-10-CM

## 2023-11-03 LAB
ALBUMIN SERPL BCP-MCNC: 3.8 G/DL (ref 3.5–5.2)
ALP SERPL-CCNC: 91 U/L (ref 55–135)
ALT SERPL W/O P-5'-P-CCNC: 41 U/L (ref 10–44)
ANION GAP SERPL CALC-SCNC: 9 MMOL/L (ref 8–16)
AST SERPL-CCNC: 21 U/L (ref 10–40)
BASOPHILS # BLD AUTO: 0.04 K/UL (ref 0–0.2)
BASOPHILS NFR BLD: 0.7 % (ref 0–1.9)
BILIRUB SERPL-MCNC: 0.7 MG/DL (ref 0.1–1)
BILIRUB UR QL STRIP: NEGATIVE
BUN SERPL-MCNC: 10 MG/DL (ref 6–20)
CALCIUM SERPL-MCNC: 9.1 MG/DL (ref 8.7–10.5)
CHLORIDE SERPL-SCNC: 104 MMOL/L (ref 95–110)
CLARITY UR: CLEAR
CO2 SERPL-SCNC: 23 MMOL/L (ref 23–29)
COLOR UR: YELLOW
CREAT SERPL-MCNC: 1 MG/DL (ref 0.5–1.4)
DIFFERENTIAL METHOD: ABNORMAL
EOSINOPHIL # BLD AUTO: 0.2 K/UL (ref 0–0.5)
EOSINOPHIL NFR BLD: 2.6 % (ref 0–8)
ERYTHROCYTE [DISTWIDTH] IN BLOOD BY AUTOMATED COUNT: 11.9 % (ref 11.5–14.5)
EST. GFR  (NO RACE VARIABLE): >60 ML/MIN/1.73 M^2
GLUCOSE SERPL-MCNC: 144 MG/DL (ref 70–110)
GLUCOSE UR QL STRIP: NEGATIVE
HCT VFR BLD AUTO: 46.4 % (ref 40–54)
HCV AB SERPL QL IA: NEGATIVE
HGB BLD-MCNC: 16.3 G/DL (ref 14–18)
HGB UR QL STRIP: ABNORMAL
HIV 1+2 AB+HIV1 P24 AG SERPL QL IA: NEGATIVE
IMM GRANULOCYTES # BLD AUTO: 0.04 K/UL (ref 0–0.04)
IMM GRANULOCYTES NFR BLD AUTO: 0.7 % (ref 0–0.5)
KETONES UR QL STRIP: NEGATIVE
LEUKOCYTE ESTERASE UR QL STRIP: NEGATIVE
LYMPHOCYTES # BLD AUTO: 1.8 K/UL (ref 1–4.8)
LYMPHOCYTES NFR BLD: 31.4 % (ref 18–48)
MCH RBC QN AUTO: 31.7 PG (ref 27–31)
MCHC RBC AUTO-ENTMCNC: 35.1 G/DL (ref 32–36)
MCV RBC AUTO: 90 FL (ref 82–98)
MICROSCOPIC COMMENT: ABNORMAL
MONOCYTES # BLD AUTO: 0.6 K/UL (ref 0.3–1)
MONOCYTES NFR BLD: 10.6 % (ref 4–15)
NEUTROPHILS # BLD AUTO: 3.1 K/UL (ref 1.8–7.7)
NEUTROPHILS NFR BLD: 54 % (ref 38–73)
NITRITE UR QL STRIP: NEGATIVE
NRBC BLD-RTO: 0 /100 WBC
PH UR STRIP: 6 [PH] (ref 5–8)
PLATELET # BLD AUTO: 211 K/UL (ref 150–450)
PMV BLD AUTO: 9.6 FL (ref 9.2–12.9)
POTASSIUM SERPL-SCNC: 3.9 MMOL/L (ref 3.5–5.1)
PROT SERPL-MCNC: 7.4 G/DL (ref 6–8.4)
PROT UR QL STRIP: NEGATIVE
RBC # BLD AUTO: 5.15 M/UL (ref 4.6–6.2)
RBC #/AREA URNS HPF: >100 /HPF (ref 0–4)
SODIUM SERPL-SCNC: 136 MMOL/L (ref 136–145)
SP GR UR STRIP: 1.01 (ref 1–1.03)
UNIDENT CRYS URNS QL MICRO: ABNORMAL
URN SPEC COLLECT METH UR: ABNORMAL
UROBILINOGEN UR STRIP-ACNC: NEGATIVE EU/DL
WBC # BLD AUTO: 5.74 K/UL (ref 3.9–12.7)
WBC #/AREA URNS HPF: 4 /HPF (ref 0–5)

## 2023-11-03 PROCEDURE — 85025 COMPLETE CBC W/AUTO DIFF WBC: CPT | Performed by: NURSE PRACTITIONER

## 2023-11-03 PROCEDURE — 87389 HIV-1 AG W/HIV-1&-2 AB AG IA: CPT | Performed by: EMERGENCY MEDICINE

## 2023-11-03 PROCEDURE — 99284 EMERGENCY DEPT VISIT MOD MDM: CPT | Mod: 25

## 2023-11-03 PROCEDURE — 63600175 PHARM REV CODE 636 W HCPCS: Performed by: EMERGENCY MEDICINE

## 2023-11-03 PROCEDURE — 63600175 PHARM REV CODE 636 W HCPCS: Performed by: NURSE PRACTITIONER

## 2023-11-03 PROCEDURE — 80053 COMPREHEN METABOLIC PANEL: CPT | Performed by: NURSE PRACTITIONER

## 2023-11-03 PROCEDURE — 96361 HYDRATE IV INFUSION ADD-ON: CPT

## 2023-11-03 PROCEDURE — 96375 TX/PRO/DX INJ NEW DRUG ADDON: CPT

## 2023-11-03 PROCEDURE — 96374 THER/PROPH/DIAG INJ IV PUSH: CPT

## 2023-11-03 PROCEDURE — 81000 URINALYSIS NONAUTO W/SCOPE: CPT | Performed by: NURSE PRACTITIONER

## 2023-11-03 PROCEDURE — 86803 HEPATITIS C AB TEST: CPT | Performed by: EMERGENCY MEDICINE

## 2023-11-03 RX ORDER — KETOROLAC TROMETHAMINE 10 MG/1
10 TABLET, FILM COATED ORAL 3 TIMES DAILY PRN
Qty: 20 TABLET | Refills: 0 | Status: SHIPPED | OUTPATIENT
Start: 2023-11-03

## 2023-11-03 RX ORDER — ONDANSETRON 2 MG/ML
4 INJECTION INTRAMUSCULAR; INTRAVENOUS
Status: COMPLETED | OUTPATIENT
Start: 2023-11-03 | End: 2023-11-03

## 2023-11-03 RX ORDER — HYDROCODONE BITARTRATE AND ACETAMINOPHEN 5; 325 MG/1; MG/1
1-2 TABLET ORAL EVERY 6 HOURS PRN
Qty: 12 TABLET | Refills: 0 | Status: SHIPPED | OUTPATIENT
Start: 2023-11-03 | End: 2023-11-13

## 2023-11-03 RX ORDER — KETOROLAC TROMETHAMINE 30 MG/ML
15 INJECTION, SOLUTION INTRAMUSCULAR; INTRAVENOUS
Status: COMPLETED | OUTPATIENT
Start: 2023-11-03 | End: 2023-11-03

## 2023-11-03 RX ORDER — HYDROMORPHONE HYDROCHLORIDE 1 MG/ML
1 INJECTION, SOLUTION INTRAMUSCULAR; INTRAVENOUS; SUBCUTANEOUS
Status: COMPLETED | OUTPATIENT
Start: 2023-11-03 | End: 2023-11-03

## 2023-11-03 RX ORDER — TAMSULOSIN HYDROCHLORIDE 0.4 MG/1
0.4 CAPSULE ORAL DAILY
Qty: 30 CAPSULE | Refills: 0 | Status: SHIPPED | OUTPATIENT
Start: 2023-11-03 | End: 2024-11-02

## 2023-11-03 RX ADMIN — HYDROMORPHONE HYDROCHLORIDE 1 MG: 0.5 INJECTION, SOLUTION INTRAMUSCULAR; INTRAVENOUS; SUBCUTANEOUS at 02:11

## 2023-11-03 RX ADMIN — KETOROLAC TROMETHAMINE 15 MG: 30 INJECTION INTRAMUSCULAR; INTRAVENOUS at 02:11

## 2023-11-03 RX ADMIN — ONDANSETRON 4 MG: 2 INJECTION INTRAMUSCULAR; INTRAVENOUS at 02:11

## 2023-11-03 RX ADMIN — SODIUM CHLORIDE, POTASSIUM CHLORIDE, SODIUM LACTATE AND CALCIUM CHLORIDE 1000 ML: 600; 310; 30; 20 INJECTION, SOLUTION INTRAVENOUS at 02:11

## 2023-11-03 NOTE — ED PROVIDER NOTES
Encounter Date: 11/3/2023    SCRIBE #1 NOTE: I, Christiana Jones, am scribing for, and in the presence of,  Ayaan Brown MD. I have scribed the following portions of the note - Other sections scribed: HPI, ROS, PE.       History     Chief Complaint   Patient presents with    Flank Pain     Reports right side flank pain, hx of kidney stone. Onset yesterday. +n/v, +fevers. Denies UTI symptoms.      Time seen by provider: 2:13 PM    This is a 48 y.o. male who presents with complaint of right sided flank pain.The Pt reports he has had episodes of kidney stones in the past. He endorse that his symptoms today feel similar to his prior experiences with kidney stones. He notes that he tried to see his GI doctor, but was unable to make an appointment. He states that the pain comes and goes. He reports he has not noticed any blood in his urine, but it has been an orange color. He denies any difficulty urinating and states he has been drinking plenty of water. He endorses some nausea and vomiting. This is the extent of the patient's complaints at this time.       The history is provided by the patient.     Review of patient's allergies indicates:  No Known Allergies  Past Medical History:   Diagnosis Date    Gout, unspecified     Kidney stones     Obesity (BMI 30-39.9) 04/27/2016    CASANDRA (obstructive sleep apnea)     Septic joint of left knee joint 2011     Past Surgical History:   Procedure Laterality Date    COLONOSCOPY N/A 4/12/2023    Procedure: COLONOSCOPY;  Surgeon: Fitz Lucia MD;  Location: [facility] ENDO ([address] [place] [address]);  Service: Gastroenterology;  Laterality: N/A;  4/3 instructions to portal-st  precall done/arrival time of 830 confirmed/mleone lpn    CORONARY ANGIOGRAPHY N/A 5/3/2019    Procedure: ANGIOGRAM, CORONARY ARTERY;  Surgeon: Joey Carter MD;  Location: [facility] CATH LAB;  Service: Cardiology;  Laterality: N/A;    KNEE ARTHROSCOPY Left     LEFT HEART CATHETERIZATION Right 5/3/2019    Procedure: Left heart cath;   Surgeon: Joey Carter MD;  Location: [facility] CATH LAB;  Service: Cardiology;  Laterality: Right;     Family History   Problem Relation Age of Onset    Heart attack Father 53    Heart disease Paternal Uncle         coronary stents    Coronary artery disease Paternal Grandfather         CABG and multiple stents    Hypertension Maternal Uncle      Social History     Tobacco Use    Smoking status: Former     Types: Cigarettes    Smokeless tobacco: Never    Tobacco comments:     quit @ age 32; smoked x 10 years   Substance Use Topics    Alcohol use: Yes     Alcohol/week: 2.0 standard drinks of alcohol     Types: 2 Shots of liquor per week     Comment: weekends    Drug use: No     Review of Systems   Constitutional:  Negative for appetite change, chills, diaphoresis, fatigue and fever.   HENT:  Negative for ear discharge, facial swelling, hearing loss, nosebleeds, tinnitus, trouble swallowing and voice change.    Eyes:  Negative for photophobia, pain, discharge, redness and visual disturbance.   Respiratory:  Negative for cough, choking, chest tightness, shortness of breath and wheezing.    Cardiovascular:  Negative for chest pain, palpitations and leg swelling.   Gastrointestinal:  Positive for nausea and vomiting. Negative for abdominal distention, abdominal pain, blood in stool, constipation and diarrhea.   Genitourinary:  Negative for difficulty urinating, dysuria, penile discharge and urgency.   Musculoskeletal:  Positive for myalgias. Negative for arthralgias, gait problem, joint swelling and neck pain.   Skin:  Negative for color change, pallor and rash.   Neurological:  Negative for dizziness, seizures, syncope, facial asymmetry, speech difficulty, weakness, numbness and headaches.   Hematological:  Negative for adenopathy. Does not bruise/bleed easily.   Psychiatric/Behavioral:  Negative for confusion, hallucinations, self-injury and suicidal ideas.        Physical Exam     Initial Vitals [11/03/23 1306]   BP  Pulse Resp Temp SpO2   (!) 181/99 83 20 98 °F (36.7 °C) (!) 94 %      MAP       --         Physical Exam    Nursing note and vitals reviewed.  Constitutional: He appears well-developed and well-nourished. He is not diaphoretic.   Uncomfortable appearing due to pain.   HENT:   Head: Normocephalic and atraumatic.   Right Ear: External ear normal.   Left Ear: External ear normal.   Nose: Nose normal.   Mouth/Throat: Oropharynx is clear and moist.   Moist mucus membranes.    Eyes: Conjunctivae and EOM are normal. Pupils are equal, round, and reactive to light. No scleral icterus.   No pallor or icterus.   Neck: Neck supple. No JVD present.   Normal range of motion.  Abdominal: Abdomen is soft. Bowel sounds are normal. He exhibits no distension and no mass.   No Tenderness at McBurney's point. There is no rebound, no guarding and negative Law's sign.   Genitourinary:    Genitourinary Comments: Right CVA tenderness.      Musculoskeletal:         General: No edema. Normal range of motion.      Cervical back: Normal range of motion and neck supple.     Lymphadenopathy:     He has no cervical adenopathy.   Neurological: He is alert and oriented to person, place, and time. He has normal strength. No cranial nerve deficit or sensory deficit.   Skin: Skin is warm and dry. No rash noted.   Psychiatric: He has a normal mood and affect. His behavior is normal. Thought content normal.         ED Course   Procedures  Labs Reviewed   CBC W/ AUTO DIFFERENTIAL - Abnormal; Notable for the following components:       Result Value    MCH 31.7 (*)     Immature Granulocytes 0.7 (*)     All other components within normal limits    Narrative:     Release to patient->Immediate   COMPREHENSIVE METABOLIC PANEL - Abnormal; Notable for the following components:    Glucose 144 (*)     All other components within normal limits    Narrative:     Release to patient->Immediate   URINALYSIS, REFLEX TO URINE CULTURE - Abnormal; Notable for the  following components:    Occult Blood UA 3+ (*)     All other components within normal limits    Narrative:     Specimen Source->Urine   URINALYSIS MICROSCOPIC - Abnormal; Notable for the following components:    RBC, UA >100 (*)     All other components within normal limits    Narrative:     Specimen Source->Urine   HIV 1 / 2 ANTIBODY    Narrative:     Release to patient->Immediate   HEPATITIS C ANTIBODY    Narrative:     Release to patient->Immediate          Imaging Results    None          Medications   lactated ringers bolus 1,000 mL (0 mLs Intravenous Stopped 11/3/23 1612)   ketorolac injection 15 mg (15 mg Intravenous Given 11/3/23 1416)   ondansetron injection 4 mg (4 mg Intravenous Given 11/3/23 1409)   HYDROmorphone injection 1 mg (1 mg Intravenous Given 11/3/23 4078)     Medical Decision Making  Amount and/or Complexity of Data Reviewed  External Data Reviewed: notes.  Labs: ordered. Decision-making details documented in ED Course.    Risk  Prescription drug management.      Patient with history of recurrent kidney stones in the past, presents with right flank pain.  No fevers.  No anterior abdominal pain.  Drinking plenty of fluids.  Previous stones have passed spontaneously.  On exam he is uncomfortable appearing but has a benign abdomen, is afebrile, with stable vital signs.  IV fluids initiated.  Laboratory studies show hematuria but no evidence of UTI.  Normal electrolytes and renal function.  Discussed patient pluses/minus is of CT scan, namely definitive diagnosis and determination of size of stone verses radiation exposure.  The patient is comfortable foregoing CT scan, which I think is quite reasonable.  After IV fluids, Toradol and further analgesia the patient is now resting comfortably.  Understands return precautions.  Will restart Flomax, anti-inflammatory/analgesics.  Encouraged follow-up with Urology, especially if symptoms persist beyond the weekend (today is Friday).          Scribe  Attestation:   Scribe #1: I performed the above scribed service and the documentation accurately describes the services I performed. I attest to the accuracy of the note.              Physician Attestation for Scribe: I, TLH, reviewed documentation as scribed in my presence, which is both accurate and complete.           Clinical Impression:   Final diagnoses:  [N23] Renal colic on right side (Primary)  [Z87.442] History of kidney stones  [R31.29] Microscopic hematuria        ED Disposition Condition    Discharge Stable          ED Prescriptions       Medication Sig Dispense Start Date End Date Auth. Provider    ketorolac (TORADOL) 10 mg tablet Take 1 tablet (10 mg total) by mouth 3 (three) times daily as needed for Pain. 20 tablet 11/3/2023 -- Ayaan Brown II, MD    tamsulosin (FLOMAX) 0.4 mg Cap Take 1 capsule (0.4 mg total) by mouth once daily. 30 capsule 11/3/2023 11/2/2024 Ayaan Brown II, MD    HYDROcodone-acetaminophen (NORCO) 5-325 mg per tablet Take 1-2 tablets by mouth every 6 (six) hours as needed for Pain. 12 tablet 11/3/2023 11/13/2023 Ayaan Brown II, MD          Follow-up Information       Follow up With Specialties Details Why Contact Info    Arline Garcia, NP Urology In 5 days  [address] [facility] [address]  [phone]               Ayaan Brown II, MD  11/03/23 2000

## 2023-11-03 NOTE — ED TRIAGE NOTES
Pt with hx of kidney stones arrived with c/o n/v, fever, and R flank pain since yesterday.  Pt states it feels like the same pain as his previous kidney stones.  T-max 100.3 yesterday, relieved after taking ibuprofen.  Pt reports malodorous urine, denies any other urinary symptoms.  Pt answering questions appropriately, speaking in complete sentences, respirations even and unlabored.  Aao x 4.

## 2023-11-03 NOTE — FIRST PROVIDER EVALUATION
Emergency Department TeleTriage Encounter Note      CHIEF COMPLAINT    Chief Complaint   Patient presents with    Flank Pain     Reports right side flank pain, hx of kidney stone. Onset yesterday. +n/v, +fevers. Denies UTI symptoms.        VITAL SIGNS   Initial Vitals [11/03/23 1306]   BP Pulse Resp Temp SpO2   (!) 181/99 83 20 98 °F (36.7 °C) (!) 94 %      MAP       --            ALLERGIES    Review of patient's allergies indicates:  No Known Allergies    PROVIDER TRIAGE NOTE  Patient presents with complaint of right-sided flank pain with associated nausea.  Reports no urinary symptoms at this time.  States history of prior kidney stone with similar symptoms.      Phy:   Constitutional: well nourished, well developed, appearing stated age, NAD    Orders already placed by provider at facility.       Initial orders will be placed and care will be transferred to an alternate provider when patient is roomed for a full evaluation. Any additional orders and the final disposition will be determined by that provider.        ORDERS  Labs Reviewed   HIV 1 / 2 ANTIBODY   HEPATITIS C ANTIBODY   CBC W/ AUTO DIFFERENTIAL   COMPREHENSIVE METABOLIC PANEL   URINALYSIS, REFLEX TO URINE CULTURE       ED Orders (720h ago, onward)      Start Ordered     Status Ordering Provider    11/03/23 1330 11/03/23 1315  lactated ringers bolus 1,000 mL  ED 1 Time         Ordered SANGITA OAKES    11/03/23 1330 11/03/23 1315  ketorolac injection 15 mg  ED 1 Time         Ordered SANGITA OAKES    11/03/23 1315 11/03/23 1315  Saline lock IV  Once         Ordered SANGITA OAKES    11/03/23 1315 11/03/23 1315  CBC auto differential  STAT         Ordered SANGITA OAKES    11/03/23 1315 11/03/23 1315  Comprehensive metabolic panel  STAT         Ordered SANGITA OAKES    11/03/23 1315 11/03/23 1315  Urinalysis, Reflex to Urine Culture Urine, Clean Catch  STAT         Ordered SANGITA OAKES    11/03/23 1315 11/03/23 1315  CT Renal Stone Study ABD  Pelvis WO  1 time imaging         Ordered SANGITA OAKES.    11/03/23 1309 11/03/23 1308  HIV 1/2 Ag/Ab (4th Gen)  STAT         Ordered ARLENE JENKINS.    11/03/23 1309 11/03/23 1308  Hepatitis C Antibody  STAT         Ordered ARLENE JENKINS              Virtual Visit Note: The provider triage portion of this emergency department evaluation and documentation was performed via [employer], a HIPAA-compliant telemedicine application, in concert with a tele-presenter in the room. A face to face patient evaluation with one of my colleagues will occur once the patient is placed in an emergency department room.      DISCLAIMER: This note was prepared with [employer] voice recognition transcription software. Garbled syntax, mangled pronouns, and other bizarre constructions may be attributed to that software system.

## 2023-11-09 DIAGNOSIS — R10.9 ABDOMINAL PAIN, UNSPECIFIED ABDOMINAL LOCATION: Primary | ICD-10-CM

## 2023-11-10 ENCOUNTER — OFFICE VISIT (OUTPATIENT)
Dept: UROLOGY | Facility: CLINIC | Age: 48
End: 2023-11-10
Payer: COMMERCIAL

## 2023-11-10 VITALS
BODY MASS INDEX: 47.2 KG/M2 | DIASTOLIC BLOOD PRESSURE: 88 MMHG | HEART RATE: 66 BPM | SYSTOLIC BLOOD PRESSURE: 139 MMHG | WEIGHT: 315 LBS

## 2023-11-10 DIAGNOSIS — N20.0 NEPHROLITHIASIS: Primary | ICD-10-CM

## 2023-11-10 PROCEDURE — 3008F PR BODY MASS INDEX (BMI) DOCUMENTED: ICD-10-PCS | Mod: CPTII,S$GLB,, | Performed by: UROLOGY

## 2023-11-10 PROCEDURE — 3044F HG A1C LEVEL LT 7.0%: CPT | Mod: CPTII,S$GLB,, | Performed by: UROLOGY

## 2023-11-10 PROCEDURE — 1160F RVW MEDS BY RX/DR IN RCRD: CPT | Mod: CPTII,S$GLB,, | Performed by: UROLOGY

## 2023-11-10 PROCEDURE — 3008F BODY MASS INDEX DOCD: CPT | Mod: CPTII,S$GLB,, | Performed by: UROLOGY

## 2023-11-10 PROCEDURE — 3079F DIAST BP 80-89 MM HG: CPT | Mod: CPTII,S$GLB,, | Performed by: UROLOGY

## 2023-11-10 PROCEDURE — 99214 OFFICE O/P EST MOD 30 MIN: CPT | Mod: S$GLB,,, | Performed by: UROLOGY

## 2023-11-10 PROCEDURE — 99214 PR OFFICE/OUTPT VISIT, EST, LEVL IV, 30-39 MIN: ICD-10-PCS | Mod: S$GLB,,, | Performed by: UROLOGY

## 2023-11-10 PROCEDURE — 1159F PR MEDICATION LIST DOCUMENTED IN MEDICAL RECORD: ICD-10-PCS | Mod: CPTII,S$GLB,, | Performed by: UROLOGY

## 2023-11-10 PROCEDURE — 1159F MED LIST DOCD IN RCRD: CPT | Mod: CPTII,S$GLB,, | Performed by: UROLOGY

## 2023-11-10 PROCEDURE — 3075F SYST BP GE 130 - 139MM HG: CPT | Mod: CPTII,S$GLB,, | Performed by: UROLOGY

## 2023-11-10 PROCEDURE — 99999 PR PBB SHADOW E&M-EST. PATIENT-LVL III: CPT | Mod: PBBFAC,,, | Performed by: UROLOGY

## 2023-11-10 PROCEDURE — 3044F PR MOST RECENT HEMOGLOBIN A1C LEVEL <7.0%: ICD-10-PCS | Mod: CPTII,S$GLB,, | Performed by: UROLOGY

## 2023-11-10 PROCEDURE — 99999 PR PBB SHADOW E&M-EST. PATIENT-LVL III: ICD-10-PCS | Mod: PBBFAC,,, | Performed by: UROLOGY

## 2023-11-10 PROCEDURE — 3079F PR MOST RECENT DIASTOLIC BLOOD PRESSURE 80-89 MM HG: ICD-10-PCS | Mod: CPTII,S$GLB,, | Performed by: UROLOGY

## 2023-11-10 PROCEDURE — 1160F PR REVIEW ALL MEDS BY PRESCRIBER/CLIN PHARMACIST DOCUMENTED: ICD-10-PCS | Mod: CPTII,S$GLB,, | Performed by: UROLOGY

## 2023-11-10 PROCEDURE — 3075F PR MOST RECENT SYSTOLIC BLOOD PRESS GE 130-139MM HG: ICD-10-PCS | Mod: CPTII,S$GLB,, | Performed by: UROLOGY

## 2023-11-10 NOTE — PROGRESS NOTES
"Subjective:      Fitz Venegas Jr. is a 48 y.o. male who returns today regarding his nephrolithiasis.    Urolithiasis  Patient complains of right flank pain without radiation to the abdomen. Onset of symptoms was abrupt starting 8 days ago with intermittent course since that time. The patient has had nausea and no vomiting and no diaphoresis. There has been no fever or chills. The patient is not complaining of dysuria or frequency. Risk factors for urolithiasis: history of stone disease.     The following portions of the patient's history were reviewed and updated as appropriate: allergies, current medications, past family history, past medical history, past social history, past surgical history and problem list.    Review of Systems  A comprehensive multipoint review of systems was negative except as otherwise stated in the HPI.     Objective:   Vitals: /88 (BP Location: Left arm, Patient Position: Sitting, BP Method: Large (Automatic))   Pulse 66   Wt (!) 166.7 kg (367 lb 9.9 oz)   BMI 47.20 kg/m²     Physical Exam   General: alert and oriented, no acute distress  Respiratory: Symmetric expansion, non-labored breathing  Neuro: no gross deficits  Psych: normal judgment and insight, normal mood/affect, and non-anxious    Lab Review     Lab Results   Component Value Date    WBC 5.74 11/03/2023    HGB 16.3 11/03/2023    HCT 46.4 11/03/2023    MCV 90 11/03/2023     11/03/2023     Lab Results   Component Value Date    CREATININE 1.0 11/03/2023    BUN 10 11/03/2023     No results found for: "PSA", "PSADIAG"    Imaging (all images personally reviewed; agree with report below)  Results for orders placed during the hospital encounter of 11/10/23    CT Renal Stone Study ABD Pelvis WO    Narrative  EXAMINATION:  CT RENAL STONE STUDY ABD PELVIS WO    CLINICAL HISTORY:  Flank pain, kidney stone suspected; Unspecified abdominal pain    TECHNIQUE:  Low dose axial images, sagittal and coronal reformations were " obtained from the lung bases to the pubic symphysis.  Contrast was not administered.    COMPARISON:  None    FINDINGS:  Lung Bases: Unremarkable.    Kidneys/ Ureters: There is a 4 mm stone in the right proximal lumbar ureter with mild intrarenal collecting system dilatation.  Noncontrast appearance of the remainder of the right kidney and left kidney and collecting system unremarkable.    Liver: Enlarged and fatty infiltrated.    Gallbladder: No calcified gallstones.    Bile Ducts: No evidence of dilated ducts.    Pancreas: No mass or peripancreatic fat stranding.    Spleen: Unremarkable.    Adrenals: Left adrenal gland calcifications suggesting remote hemorrhage.  Right adrenal gland unremarkable.    Pelvic organs: Unremarkable.    GI Tract/Mesentery: No evidence of bowel obstruction or inflammation.    Retroperitoneum: Shotty reactive appearing bilateral retroperitoneal lymph nodes.    Vasculature: Aortic atherosclerosis is present.    Bones: Unremarkable.    Impression  4 mm right proximal lumbar ureteral stone with mild hydronephrosis.    Hepatic steatosis      Electronically signed by: Delgado Guerrero Jr  Date:    11/10/2023  Time:    09:29     Assessment and Plan:   1. Nephrolithiasis  -- CT reviewed with patient. 4mm proximal right ureteral stone noted.  -- Symptoms are well controlled for now so wishes to proceed w/ MET  -- Plan for FU in 4 weeks w/ KUB and US; FU sooner if symptoms are more severe

## 2024-02-02 ENCOUNTER — PATIENT MESSAGE (OUTPATIENT)
Dept: ADMINISTRATIVE | Facility: OTHER | Age: 49
End: 2024-02-02
Payer: COMMERCIAL

## 2024-05-09 ENCOUNTER — OFFICE VISIT (OUTPATIENT)
Dept: PRIMARY CARE CLINIC | Facility: CLINIC | Age: 49
End: 2024-05-09
Payer: COMMERCIAL

## 2024-05-09 ENCOUNTER — LAB VISIT (OUTPATIENT)
Dept: LAB | Facility: HOSPITAL | Age: 49
End: 2024-05-09
Attending: STUDENT IN AN ORGANIZED HEALTH CARE EDUCATION/TRAINING PROGRAM
Payer: COMMERCIAL

## 2024-05-09 VITALS
WEIGHT: 315 LBS | HEART RATE: 85 BPM | BODY MASS INDEX: 40.43 KG/M2 | HEIGHT: 74 IN | DIASTOLIC BLOOD PRESSURE: 86 MMHG | SYSTOLIC BLOOD PRESSURE: 138 MMHG | OXYGEN SATURATION: 98 %

## 2024-05-09 DIAGNOSIS — E78.5 HYPERLIPIDEMIA, UNSPECIFIED HYPERLIPIDEMIA TYPE: ICD-10-CM

## 2024-05-09 DIAGNOSIS — I10 ESSENTIAL HYPERTENSION: ICD-10-CM

## 2024-05-09 DIAGNOSIS — Z82.49 FAMILY HISTORY OF HEART ATTACK: ICD-10-CM

## 2024-05-09 DIAGNOSIS — Z82.49 FAMILY HISTORY OF CORONARY ARTERY BYPASS GRAFT SURGERY: ICD-10-CM

## 2024-05-09 DIAGNOSIS — G47.33 OSA (OBSTRUCTIVE SLEEP APNEA): ICD-10-CM

## 2024-05-09 DIAGNOSIS — Z82.49 FAMILY HISTORY OF CARDIAC PACEMAKER: ICD-10-CM

## 2024-05-09 DIAGNOSIS — Z82.49 FAMILY HISTORY OF EARLY CAD: ICD-10-CM

## 2024-05-09 DIAGNOSIS — R06.02 SOB (SHORTNESS OF BREATH): ICD-10-CM

## 2024-05-09 DIAGNOSIS — E66.01 MORBID OBESITY WITH BMI OF 45.0-49.9, ADULT: ICD-10-CM

## 2024-05-09 DIAGNOSIS — E66.01 MORBID OBESITY WITH BMI OF 45.0-49.9, ADULT: Primary | ICD-10-CM

## 2024-05-09 LAB
ALBUMIN SERPL BCP-MCNC: 4 G/DL (ref 3.5–5.2)
ALP SERPL-CCNC: 124 U/L (ref 55–135)
ALT SERPL W/O P-5'-P-CCNC: 37 U/L (ref 10–44)
ANION GAP SERPL CALC-SCNC: 14 MMOL/L (ref 8–16)
AST SERPL-CCNC: 23 U/L (ref 10–40)
BASOPHILS # BLD AUTO: 0.06 K/UL (ref 0–0.2)
BASOPHILS NFR BLD: 1 % (ref 0–1.9)
BILIRUB SERPL-MCNC: 0.8 MG/DL (ref 0.1–1)
BUN SERPL-MCNC: 11 MG/DL (ref 6–20)
CALCIUM SERPL-MCNC: 9.7 MG/DL (ref 8.7–10.5)
CHLORIDE SERPL-SCNC: 102 MMOL/L (ref 95–110)
CHOLEST SERPL-MCNC: 170 MG/DL (ref 120–199)
CHOLEST/HDLC SERPL: 5.5 {RATIO} (ref 2–5)
CO2 SERPL-SCNC: 23 MMOL/L (ref 23–29)
CREAT SERPL-MCNC: 1 MG/DL (ref 0.5–1.4)
DIFFERENTIAL METHOD BLD: ABNORMAL
EOSINOPHIL # BLD AUTO: 0.2 K/UL (ref 0–0.5)
EOSINOPHIL NFR BLD: 2.7 % (ref 0–8)
ERYTHROCYTE [DISTWIDTH] IN BLOOD BY AUTOMATED COUNT: 12 % (ref 11.5–14.5)
EST. GFR  (NO RACE VARIABLE): >60 ML/MIN/1.73 M^2
ESTIMATED AVG GLUCOSE: 120 MG/DL (ref 68–131)
GLUCOSE SERPL-MCNC: 115 MG/DL (ref 70–110)
HBA1C MFR BLD: 5.8 % (ref 4–5.6)
HCT VFR BLD AUTO: 51.1 % (ref 40–54)
HDLC SERPL-MCNC: 31 MG/DL (ref 40–75)
HDLC SERPL: 18.2 % (ref 20–50)
HGB BLD-MCNC: 17.3 G/DL (ref 14–18)
IMM GRANULOCYTES # BLD AUTO: 0.03 K/UL (ref 0–0.04)
IMM GRANULOCYTES NFR BLD AUTO: 0.5 % (ref 0–0.5)
LDLC SERPL CALC-MCNC: 78.4 MG/DL (ref 63–159)
LYMPHOCYTES # BLD AUTO: 2.1 K/UL (ref 1–4.8)
LYMPHOCYTES NFR BLD: 36.5 % (ref 18–48)
MCH RBC QN AUTO: 31.7 PG (ref 27–31)
MCHC RBC AUTO-ENTMCNC: 33.9 G/DL (ref 32–36)
MCV RBC AUTO: 94 FL (ref 82–98)
MONOCYTES # BLD AUTO: 0.6 K/UL (ref 0.3–1)
MONOCYTES NFR BLD: 9.6 % (ref 4–15)
NEUTROPHILS # BLD AUTO: 2.9 K/UL (ref 1.8–7.7)
NEUTROPHILS NFR BLD: 49.7 % (ref 38–73)
NONHDLC SERPL-MCNC: 139 MG/DL
NRBC BLD-RTO: 0 /100 WBC
PLATELET # BLD AUTO: 236 K/UL (ref 150–450)
PMV BLD AUTO: 10.1 FL (ref 9.2–12.9)
POTASSIUM SERPL-SCNC: 4.1 MMOL/L (ref 3.5–5.1)
PROT SERPL-MCNC: 8 G/DL (ref 6–8.4)
RBC # BLD AUTO: 5.45 M/UL (ref 4.6–6.2)
SODIUM SERPL-SCNC: 139 MMOL/L (ref 136–145)
TRIGL SERPL-MCNC: 303 MG/DL (ref 30–150)
TSH SERPL DL<=0.005 MIU/L-ACNC: 2.33 UIU/ML (ref 0.4–4)
WBC # BLD AUTO: 5.86 K/UL (ref 3.9–12.7)

## 2024-05-09 PROCEDURE — 36415 COLL VENOUS BLD VENIPUNCTURE: CPT | Mod: PN | Performed by: STUDENT IN AN ORGANIZED HEALTH CARE EDUCATION/TRAINING PROGRAM

## 2024-05-09 PROCEDURE — 1159F MED LIST DOCD IN RCRD: CPT | Mod: CPTII,S$GLB,, | Performed by: STUDENT IN AN ORGANIZED HEALTH CARE EDUCATION/TRAINING PROGRAM

## 2024-05-09 PROCEDURE — 1160F RVW MEDS BY RX/DR IN RCRD: CPT | Mod: CPTII,S$GLB,, | Performed by: STUDENT IN AN ORGANIZED HEALTH CARE EDUCATION/TRAINING PROGRAM

## 2024-05-09 PROCEDURE — 80061 LIPID PANEL: CPT | Performed by: STUDENT IN AN ORGANIZED HEALTH CARE EDUCATION/TRAINING PROGRAM

## 2024-05-09 PROCEDURE — 82172 ASSAY OF APOLIPOPROTEIN: CPT | Mod: 91 | Performed by: STUDENT IN AN ORGANIZED HEALTH CARE EDUCATION/TRAINING PROGRAM

## 2024-05-09 PROCEDURE — 3075F SYST BP GE 130 - 139MM HG: CPT | Mod: CPTII,S$GLB,, | Performed by: STUDENT IN AN ORGANIZED HEALTH CARE EDUCATION/TRAINING PROGRAM

## 2024-05-09 PROCEDURE — 99999 PR PBB SHADOW E&M-EST. PATIENT-LVL III: CPT | Mod: PBBFAC,,, | Performed by: STUDENT IN AN ORGANIZED HEALTH CARE EDUCATION/TRAINING PROGRAM

## 2024-05-09 PROCEDURE — 82172 ASSAY OF APOLIPOPROTEIN: CPT | Performed by: STUDENT IN AN ORGANIZED HEALTH CARE EDUCATION/TRAINING PROGRAM

## 2024-05-09 PROCEDURE — 99214 OFFICE O/P EST MOD 30 MIN: CPT | Mod: S$GLB,,, | Performed by: STUDENT IN AN ORGANIZED HEALTH CARE EDUCATION/TRAINING PROGRAM

## 2024-05-09 PROCEDURE — 3079F DIAST BP 80-89 MM HG: CPT | Mod: CPTII,S$GLB,, | Performed by: STUDENT IN AN ORGANIZED HEALTH CARE EDUCATION/TRAINING PROGRAM

## 2024-05-09 PROCEDURE — 84443 ASSAY THYROID STIM HORMONE: CPT | Performed by: STUDENT IN AN ORGANIZED HEALTH CARE EDUCATION/TRAINING PROGRAM

## 2024-05-09 PROCEDURE — 85025 COMPLETE CBC W/AUTO DIFF WBC: CPT | Performed by: STUDENT IN AN ORGANIZED HEALTH CARE EDUCATION/TRAINING PROGRAM

## 2024-05-09 PROCEDURE — 83036 HEMOGLOBIN GLYCOSYLATED A1C: CPT | Performed by: STUDENT IN AN ORGANIZED HEALTH CARE EDUCATION/TRAINING PROGRAM

## 2024-05-09 PROCEDURE — 3008F BODY MASS INDEX DOCD: CPT | Mod: CPTII,S$GLB,, | Performed by: STUDENT IN AN ORGANIZED HEALTH CARE EDUCATION/TRAINING PROGRAM

## 2024-05-09 PROCEDURE — 80053 COMPREHEN METABOLIC PANEL: CPT | Performed by: STUDENT IN AN ORGANIZED HEALTH CARE EDUCATION/TRAINING PROGRAM

## 2024-05-09 RX ORDER — TIRZEPATIDE 2.5 MG/.5ML
2.5 INJECTION, SOLUTION SUBCUTANEOUS
Qty: 4 PEN | Refills: 0 | Status: SHIPPED | OUTPATIENT
Start: 2024-05-09 | End: 2024-05-10 | Stop reason: SDUPTHER

## 2024-05-09 RX ORDER — SEMAGLUTIDE 0.68 MG/ML
0.25 INJECTION, SOLUTION SUBCUTANEOUS
Qty: 3 ML | Refills: 0 | Status: SHIPPED | OUTPATIENT
Start: 2024-05-09 | End: 2024-05-09

## 2024-05-09 RX ORDER — SEMAGLUTIDE 0.68 MG/ML
0.25 INJECTION, SOLUTION SUBCUTANEOUS
Qty: 1.5 ML | Refills: 0 | Status: SHIPPED | OUTPATIENT
Start: 2024-05-09 | End: 2024-05-09

## 2024-05-09 NOTE — PROGRESS NOTES
Fitz Venegas Jr.  1975        Subjective     Chief Complaint:    History of Present Illness:  Mr. Fitz Venegas Jr. is a 49 y.o. male who presents to clinic for weight loss encounter.     BMI 47. Feeling fatigued and needs assistance with weight loss.  Wt Readings from Last 5 Encounters:   05/09/24 (!) 167.1 kg (368 lb 6.2 oz)   11/10/23 (!) 166.7 kg (367 lb 9.9 oz)   11/03/23 (!) 156.5 kg (345 lb)   07/24/23 (!) 162.5 kg (358 lb 4 oz)   04/12/23 (!) 149.7 kg (330 lb)     Last visit we had gone over SE and MOA.   No fam hx or personal hx medullary thyroid cancer or any thyroid cancer.    CASANDRA- using Cpap.     Multiple recent family members with MI, even in 30s.   Dad with bypass.  No chest pain but some RODRIGUEZ. Mild. With significant exertion.     Had angiogram in 2019-->  The ejection fraction is greater than 55% by visual estimate.  LVEDP (Pre): 8  Normal coronary arteries with right dominance  Plan -modify risk factors, patient will need sleep study due to sleep apnea  Summary  Low normal left ventricular systolic function. The estimated ejection fraction is 50-55%  Normal LV diastolic function.  Normal right ventricular systolic function.  Trivial MR       Review of Systems   Constitutional:  Positive for malaise/fatigue. Negative for chills, fever and weight loss.   HENT:  Positive for congestion.    Respiratory:  Positive for shortness of breath.    Cardiovascular:  Negative for chest pain and leg swelling.   Gastrointestinal:  Negative for abdominal pain and vomiting.   Musculoskeletal:  Positive for joint pain.   Neurological:  Negative for sensory change, speech change and focal weakness.   Psychiatric/Behavioral:  The patient is not nervous/anxious.         PAST HISTORY:     Past Medical History:   Diagnosis Date    Gout, unspecified     Kidney stones     Obesity (BMI 30-39.9) 04/27/2016    CASANDRA (obstructive sleep apnea)     Septic joint of left knee joint 2011       Past Surgical History:   Procedure  Laterality Date    COLONOSCOPY N/A 2023    Procedure: COLONOSCOPY;  Surgeon: Fitz Lucia MD;  Location: [facility] ENDO (4TH FLR);  Service: Gastroenterology;  Laterality: N/A;  4/3 instructions to portal-st  precall done/arrival time of 830 confirmed/mleone lpn    CORONARY ANGIOGRAPHY N/A 5/3/2019    Procedure: ANGIOGRAM, CORONARY ARTERY;  Surgeon: Joey Carter MD;  Location: [facility] CATH LAB;  Service: Cardiology;  Laterality: N/A;    KNEE ARTHROSCOPY Left     LEFT HEART CATHETERIZATION Right 5/3/2019    Procedure: Left heart cath;  Surgeon: Joey Carter MD;  Location: [facility] CATH LAB;  Service: Cardiology;  Laterality: Right;       Family History   Problem Relation Name Age of Onset    Heart attack Father  53    Heart disease Paternal Uncle          coronary stents    Coronary artery disease Paternal Grandfather          CABG and multiple stents    Hypertension Maternal Uncle         Social History     Socioeconomic History    Marital status:    Tobacco Use    Smoking status: Former     Types: Cigarettes    Smokeless tobacco: Never    Tobacco comments:     quit @ age 32; smoked x 10 years   Substance and Sexual Activity    Alcohol use: Yes     Alcohol/week: 2.0 standard drinks of alcohol     Types: 2 Shots of liquor per week     Comment: weekends    Drug use: No    Sexual activity: Yes     Partners: Female     Birth control/protection: I.U.D.   Social History Narrative    Engaged to Arabella;  daughter Bert    Has 1 daughter from previous marriage     Social Determinants of Health     Financial Resource Strain: Medium Risk (2024)    Overall Financial Resource Strain (CARDIA)     Difficulty of Paying Living Expenses: Somewhat hard   Food Insecurity: Food Insecurity Present (2024)    Hunger Vital Sign     Worried About Running Out of Food in the Last Year: Sometimes true     Ran Out of Food in the Last Year: Never true   Transportation Needs: No Transportation Needs (2024)    PRAPARE -  "Transportation     Lack of Transportation (Medical): No     Lack of Transportation (Non-Medical): No   Physical Activity: Insufficiently Active (5/9/2024)    Exercise Vital Sign     Days of Exercise per Week: 1 day     Minutes of Exercise per Session: 20 min   Stress: Stress Concern Present (5/9/2024)    [employer] - Occupational Stress Questionnaire     Feeling of Stress : To some extent   Housing Stability: High Risk (5/9/2024)    Housing Stability Vital Sign     Unable to Pay for Housing in the Last Year: Yes       MEDICATIONS & ALLERGIES:     Current Outpatient Medications on File Prior to Visit   Medication Sig    allopurinoL (ZYLOPRIM) 100 MG tablet Take 1 tablet (100 mg total) by mouth once daily.    azelastine (ASTELIN) 137 mcg (0.1 %) nasal spray 1 spray (137 mcg total) by Nasal route 2 (two) times daily.    colchicine (COLCRYS) 0.6 mg tablet Take 2 tablets (1.2 mg total) by mouth once daily for 1 day, THEN 1 tablet (0.6 mg total) once daily. The 0.6 mg daily is to help prevent future flares while the allopurinol kicks in..    ketorolac (TORADOL) 10 mg tablet Take 1 tablet (10 mg total) by mouth 3 (three) times daily as needed for Pain.    tamsulosin (FLOMAX) 0.4 mg Cap Take 1 capsule (0.4 mg total) by mouth once daily.     No current facility-administered medications on file prior to visit.       Review of patient's allergies indicates:  No Known Allergies    OBJECTIVE:     Vital Signs:  Vitals:    05/09/24 1035   BP: 138/86   BP Location: Left arm   Patient Position: Sitting   BP Method: Large (Manual)   Pulse: 85   SpO2: 98%   Weight: (!) 167.1 kg (368 lb 6.2 oz)   Height: 6' 2" (1.88 m)       Body mass index is 47.3 kg/m².     Physical Exam:  Physical Exam  Vitals and nursing note reviewed.   Constitutional:       General: He is not in acute distress.     Appearance: Normal appearance. He is obese. He is not ill-appearing, toxic-appearing or diaphoretic.   HENT:      Head: " "Normocephalic and atraumatic.   Eyes:      General: No scleral icterus.        Right eye: No discharge.         Left eye: No discharge.      Conjunctiva/sclera: Conjunctivae normal.   Cardiovascular:      Rate and Rhythm: Normal rate and regular rhythm.      Heart sounds: Normal heart sounds. No murmur heard.  Pulmonary:      Effort: Pulmonary effort is normal. No respiratory distress.   Musculoskeletal:         General: Normal range of motion.      Right lower leg: No edema.      Left lower leg: No edema.   Skin:     General: Skin is warm and dry.   Neurological:      Mental Status: He is alert and oriented to person, place, and time. Mental status is at baseline.      Gait: Gait normal.   Psychiatric:         Mood and Affect: Mood normal.         Behavior: Behavior normal.            Laboratory  Lab Results   Component Value Date    WBC 5.74 11/03/2023    HGB 16.3 11/03/2023    HCT 46.4 11/03/2023    MCV 90 11/03/2023     11/03/2023     Lab Results   Component Value Date     (H) 11/03/2023     11/03/2023    K 3.9 11/03/2023     11/03/2023    CO2 23 11/03/2023    BUN 10 11/03/2023    CREATININE 1.0 11/03/2023    CALCIUM 9.1 11/03/2023    MG 1.9 05/03/2019     No results found for: "INR", "PROTIME"  Lab Results   Component Value Date    HGBA1C 5.4 07/24/2023           Health Maintenance         Date Due Completion Date    COVID-19 Vaccine (3 - 2023-24 season) 09/01/2023 8/25/2021    Influenza Vaccine (Season Ended) 09/01/2024 1/25/2022    Colorectal Cancer Screening 04/12/2026 4/12/2023    Hemoglobin A1c (Diabetic Prevention Screening) 07/24/2026 7/24/2023    TETANUS VACCINE 01/11/2027 1/11/2017    Lipid Panel 01/13/2028 1/13/2023              ASSESSMENT & PLAN:   Mr. Fitz Venegas Elisabet is a 49 y.o. male who was seen today in clinic for weight.     Pt denies personal or fam hx of medullary (or any type of) thyroid cancer or hx of pancreatitis. Counseled on risks of " pancreatitis/gallstones/nausea/vomiting/diarrhea/HA/fatigue/dehydration (among others) with this medication. Discussed need to present to ED urgently if he/she develops severe abdominal/back pain, nausea, vomiting, confusion as these may be signs of pancreatitis.        1. Morbid obesity with BMI of 45.0-49.9, adult  -     CBC Auto Differential; Future; Expected date: 05/09/2024  -     Comprehensive Metabolic Panel; Future; Expected date: 05/09/2024  -     Hemoglobin A1C; Future; Expected date: 05/09/2024  -     Lipid Panel; Future; Expected date: 05/09/2024  -     TSH; Future; Expected date: 05/09/2024  -     Discontinue: semaglutide (OZEMPIC) 0.25 mg or 0.5 mg (2 mg/3 mL) pen injector; Inject 0.25 mg into the skin every 7 days.  Dispense: 1.5 mL; Refill: 0  -     Discontinue: semaglutide (OZEMPIC) 0.25 mg or 0.5 mg (2 mg/3 mL) pen injector; Inject 0.25 mg into the skin every 7 days.  Dispense: 3 mL; Refill: 0  -     tirzepatide, weight loss, (ZEPBOUND) 2.5 mg/0.5 mL PnIj; Inject 2.5 mg into the skin every 7 days.  Dispense: 4 Pen; Refill: 0    2. CASANDRA (obstructive sleep apnea)  -     CBC Auto Differential; Future; Expected date: 05/09/2024  -     Comprehensive Metabolic Panel; Future; Expected date: 05/09/2024  -     Hemoglobin A1C; Future; Expected date: 05/09/2024  -     Lipid Panel; Future; Expected date: 05/09/2024  -     TSH; Future; Expected date: 05/09/2024  -     Discontinue: semaglutide (OZEMPIC) 0.25 mg or 0.5 mg (2 mg/3 mL) pen injector; Inject 0.25 mg into the skin every 7 days.  Dispense: 1.5 mL; Refill: 0  -     Discontinue: semaglutide (OZEMPIC) 0.25 mg or 0.5 mg (2 mg/3 mL) pen injector; Inject 0.25 mg into the skin every 7 days.  Dispense: 3 mL; Refill: 0  -     tirzepatide, weight loss, (ZEPBOUND) 2.5 mg/0.5 mL PnIj; Inject 2.5 mg into the skin every 7 days.  Dispense: 4 Pen; Refill: 0    3. Essential hypertension  -     CBC Auto Differential; Future; Expected date: 05/09/2024  -     Comprehensive  Metabolic Panel; Future; Expected date: 05/09/2024  -     Hemoglobin A1C; Future; Expected date: 05/09/2024  -     Lipid Panel; Future; Expected date: 05/09/2024  -     TSH; Future; Expected date: 05/09/2024  -     Discontinue: semaglutide (OZEMPIC) 0.25 mg or 0.5 mg (2 mg/3 mL) pen injector; Inject 0.25 mg into the skin every 7 days.  Dispense: 1.5 mL; Refill: 0  -     Discontinue: semaglutide (OZEMPIC) 0.25 mg or 0.5 mg (2 mg/3 mL) pen injector; Inject 0.25 mg into the skin every 7 days.  Dispense: 3 mL; Refill: 0  -     tirzepatide, weight loss, (ZEPBOUND) 2.5 mg/0.5 mL PnIj; Inject 2.5 mg into the skin every 7 days.  Dispense: 4 Pen; Refill: 0    4. Hyperlipidemia, unspecified hyperlipidemia type  -     CBC Auto Differential; Future; Expected date: 05/09/2024  -     Comprehensive Metabolic Panel; Future; Expected date: 05/09/2024  -     Hemoglobin A1C; Future; Expected date: 05/09/2024  -     Lipid Panel; Future; Expected date: 05/09/2024  -     TSH; Future; Expected date: 05/09/2024  -     Discontinue: semaglutide (OZEMPIC) 0.25 mg or 0.5 mg (2 mg/3 mL) pen injector; Inject 0.25 mg into the skin every 7 days.  Dispense: 1.5 mL; Refill: 0  -     Apolipoprotein B; Future; Expected date: 05/09/2024  -     Apolipoprotein A1; Future; Expected date: 05/09/2024  -     Echo; Future  -     Discontinue: semaglutide (OZEMPIC) 0.25 mg or 0.5 mg (2 mg/3 mL) pen injector; Inject 0.25 mg into the skin every 7 days.  Dispense: 3 mL; Refill: 0  -     tirzepatide, weight loss, (ZEPBOUND) 2.5 mg/0.5 mL PnIj; Inject 2.5 mg into the skin every 7 days.  Dispense: 4 Pen; Refill: 0    5. Family history of early CAD  -     Apolipoprotein B; Future; Expected date: 05/09/2024  -     Apolipoprotein A1; Future; Expected date: 05/09/2024  -     Ambulatory referral/consult to Cardiology; Future; Expected date: 05/16/2024  -     Echo; Future    6. Family history of coronary artery bypass graft surgery  -     Apolipoprotein B; Future; Expected  date: 05/09/2024  -     Apolipoprotein A1; Future; Expected date: 05/09/2024  -     Echo; Future    7. Family history of cardiac pacemaker  -     Apolipoprotein B; Future; Expected date: 05/09/2024  -     Apolipoprotein A1; Future; Expected date: 05/09/2024  -     Echo; Future    8. Family history of heart attack  -     Apolipoprotein B; Future; Expected date: 05/09/2024  -     Apolipoprotein A1; Future; Expected date: 05/09/2024  -     Ambulatory referral/consult to Cardiology; Future; Expected date: 05/16/2024  -     Echo; Future    9. SOB (shortness of breath)  -     Ambulatory referral/consult to Cardiology; Future; Expected date: 05/16/2024  -     Echo; Future           Belia Laws MD  Internal Medicine         Portions of this note may have been generated using voice recognition software.  Please excuse any spelling/grammatical errors. Occasional wrong-word or sound-a-like substitutions may have also occurred due to the inherent limitations of voice recognition software. Please read the chart carefully and recognize, using context, where substitutions have occurred.

## 2024-05-10 ENCOUNTER — PATIENT MESSAGE (OUTPATIENT)
Dept: PRIMARY CARE CLINIC | Facility: CLINIC | Age: 49
End: 2024-05-10
Payer: COMMERCIAL

## 2024-05-10 ENCOUNTER — TELEPHONE (OUTPATIENT)
Dept: PRIMARY CARE CLINIC | Facility: CLINIC | Age: 49
End: 2024-05-10
Payer: COMMERCIAL

## 2024-05-10 DIAGNOSIS — I10 ESSENTIAL HYPERTENSION: ICD-10-CM

## 2024-05-10 DIAGNOSIS — G47.33 OSA (OBSTRUCTIVE SLEEP APNEA): ICD-10-CM

## 2024-05-10 DIAGNOSIS — E78.5 HYPERLIPIDEMIA, UNSPECIFIED HYPERLIPIDEMIA TYPE: ICD-10-CM

## 2024-05-10 DIAGNOSIS — E66.01 MORBID OBESITY WITH BMI OF 45.0-49.9, ADULT: ICD-10-CM

## 2024-05-10 LAB — APO A-I SERPL-MCNC: 105 MG/DL

## 2024-05-10 RX ORDER — TIRZEPATIDE 2.5 MG/.5ML
2.5 INJECTION, SOLUTION SUBCUTANEOUS
Qty: 4 PEN | Refills: 0 | Status: SHIPPED | OUTPATIENT
Start: 2024-05-10 | End: 2024-05-22 | Stop reason: SDUPTHER

## 2024-05-10 NOTE — TELEPHONE ENCOUNTER
----- Message from Marly Hirsch sent at 5/10/2024  2:00 PM CDT -----  Contact: [facility]/Kathya [phone]  Pharmacy is calling to clarify an RX.  RX name:  tirzepatide, weight loss, (ZEPBOUND) 2.5 mg/0.5 mL PnIj   What do they need to clarify:  script needs to state okay to compound on it. Please call them to confirm or resend with it written on there. Thanks  Comments:

## 2024-05-10 NOTE — TELEPHONE ENCOUNTER
"Lov 5/10/24  [facility] pharmacy received the rx you sent today for zepbound. They are stating that the prescription  needs to state that "it is ok to compound on it "  Is this something you are willing to do? If so I can pend it for you  "

## 2024-05-11 LAB — APO B SERPL-MCNC: 102 MG/DL

## 2024-05-13 ENCOUNTER — PATIENT MESSAGE (OUTPATIENT)
Dept: PRIMARY CARE CLINIC | Facility: CLINIC | Age: 49
End: 2024-05-13
Payer: COMMERCIAL

## 2024-05-13 DIAGNOSIS — I10 ESSENTIAL HYPERTENSION: ICD-10-CM

## 2024-05-13 DIAGNOSIS — E78.5 HYPERLIPIDEMIA, UNSPECIFIED HYPERLIPIDEMIA TYPE: ICD-10-CM

## 2024-05-13 DIAGNOSIS — G47.33 OSA (OBSTRUCTIVE SLEEP APNEA): ICD-10-CM

## 2024-05-13 DIAGNOSIS — E66.01 MORBID OBESITY WITH BMI OF 45.0-49.9, ADULT: ICD-10-CM

## 2024-05-14 ENCOUNTER — HOSPITAL ENCOUNTER (OUTPATIENT)
Dept: CARDIOLOGY | Facility: OTHER | Age: 49
Discharge: HOME | End: 2024-05-14
Attending: STUDENT IN AN ORGANIZED HEALTH CARE EDUCATION/TRAINING PROGRAM
Payer: COMMERCIAL

## 2024-05-14 ENCOUNTER — TELEPHONE (OUTPATIENT)
Dept: PRIMARY CARE CLINIC | Facility: CLINIC | Age: 49
End: 2024-05-14
Payer: COMMERCIAL

## 2024-05-14 VITALS
WEIGHT: 315 LBS | HEIGHT: 74 IN | HEART RATE: 85 BPM | BODY MASS INDEX: 40.43 KG/M2 | SYSTOLIC BLOOD PRESSURE: 138 MMHG | DIASTOLIC BLOOD PRESSURE: 86 MMHG

## 2024-05-14 DIAGNOSIS — R06.02 SOB (SHORTNESS OF BREATH): ICD-10-CM

## 2024-05-14 DIAGNOSIS — Z82.49 FAMILY HISTORY OF EARLY CAD: ICD-10-CM

## 2024-05-14 DIAGNOSIS — Z82.49 FAMILY HISTORY OF CORONARY ARTERY BYPASS GRAFT SURGERY: ICD-10-CM

## 2024-05-14 DIAGNOSIS — Z82.49 FAMILY HISTORY OF CARDIAC PACEMAKER: ICD-10-CM

## 2024-05-14 DIAGNOSIS — E78.5 HYPERLIPIDEMIA, UNSPECIFIED HYPERLIPIDEMIA TYPE: ICD-10-CM

## 2024-05-14 DIAGNOSIS — I42.2 ASYMMETRIC SEPTAL HYPERTROPHY: Primary | ICD-10-CM

## 2024-05-14 DIAGNOSIS — Z82.49 FAMILY HISTORY OF HEART ATTACK: ICD-10-CM

## 2024-05-14 PROBLEM — I51.7 ASYMMETRIC SEPTAL HYPERTROPHY: Status: ACTIVE | Noted: 2024-05-14

## 2024-05-14 LAB
ASCENDING AORTA: 2.9 CM
AV INDEX (PROSTH): 0.99
AV MEAN GRADIENT: 6 MMHG
AV PEAK GRADIENT: 9 MMHG
AV VALVE AREA BY VELOCITY RATIO: 4.53 CM²
AV VALVE AREA: 4.77 CM²
AV VELOCITY RATIO: 0.94
BSA FOR ECHO PROCEDURE: 2.95 M2
CV ECHO LV RWT: 0.59 CM
DOP CALC AO PEAK VEL: 1.48 M/S
DOP CALC AO VTI: 23.5 CM
DOP CALC LVOT AREA: 4.8 CM2
DOP CALC LVOT DIAMETER: 2.48 CM
DOP CALC LVOT PEAK VEL: 1.39 M/S
DOP CALC LVOT STROKE VOLUME: 112.01 CM3
DOP CALCLVOT PEAK VEL VTI: 23.2 CM
E WAVE DECELERATION TIME: 215.87 MSEC
E/A RATIO: 1.15
E/E' RATIO: 5.3 M/S
ECHO LV POSTERIOR WALL: 1.22 CM (ref 0.6–1.1)
FRACTIONAL SHORTENING: 38 % (ref 28–44)
INTERVENTRICULAR SEPTUM: 1.15 CM (ref 0.6–1.1)
IVRT: 65.65 MSEC
LA MAJOR: 5.7 CM
LA MINOR: 5.8 CM
LA WIDTH: 4.4 CM
LEFT ATRIUM SIZE: 3.67 CM
LEFT ATRIUM VOLUME INDEX MOD: 25 ML/M2
LEFT ATRIUM VOLUME INDEX: 28 ML/M2
LEFT ATRIUM VOLUME MOD: 70.45 CM3
LEFT ATRIUM VOLUME: 78.92 CM3
LEFT INTERNAL DIMENSION IN SYSTOLE: 2.55 CM (ref 2.1–4)
LEFT VENTRICLE DIASTOLIC VOLUME INDEX: 26.54 ML/M2
LEFT VENTRICLE DIASTOLIC VOLUME: 74.85 ML
LEFT VENTRICLE MASS INDEX: 60 G/M2
LEFT VENTRICLE SYSTOLIC VOLUME INDEX: 8.4 ML/M2
LEFT VENTRICLE SYSTOLIC VOLUME: 23.56 ML
LEFT VENTRICULAR INTERNAL DIMENSION IN DIASTOLE: 4.11 CM (ref 3.5–6)
LEFT VENTRICULAR MASS: 169.22 G
LV LATERAL E/E' RATIO: 5.08 M/S
LV SEPTAL E/E' RATIO: 5.55 M/S
LVOT MG: 3.68 MMHG
LVOT MV: 0.89 CM/S
MV PEAK A VEL: 0.53 M/S
MV PEAK E VEL: 0.61 M/S
MV STENOSIS PRESSURE HALF TIME: 62.6 MS
MV VALVE AREA P 1/2 METHOD: 3.51 CM2
PISA TR MAX VEL: 2.2 M/S
PULM VEIN S/D RATIO: 1.16
PV PEAK D VEL: 0.32 M/S
PV PEAK GRADIENT: 5 MMHG
PV PEAK S VEL: 0.37 M/S
PV PEAK VELOCITY: 1.08 M/S
RA MAJOR: 4.81 CM
RA PRESSURE ESTIMATED: 3 MMHG
RA WIDTH: 3.4 CM
RV TB RVSP: 5 MMHG
STJ: 3.05 CM
TDI LATERAL: 0.12 M/S
TDI SEPTAL: 0.11 M/S
TDI: 0.12 M/S
TR MAX PG: 19 MMHG
TV REST PULMONARY ARTERY PRESSURE: 22 MMHG
Z-SCORE OF LEFT VENTRICULAR DIMENSION IN END DIASTOLE: -22.82
Z-SCORE OF LEFT VENTRICULAR DIMENSION IN END SYSTOLE: -17.38

## 2024-05-14 PROCEDURE — 93306 TTE W/DOPPLER COMPLETE: CPT | Mod: 26,,, | Performed by: INTERNAL MEDICINE

## 2024-05-14 PROCEDURE — 93306 TTE W/DOPPLER COMPLETE: CPT

## 2024-05-14 NOTE — TELEPHONE ENCOUNTER
Called pt go over results.  Also attempted to call pt's wife with his permission who works in Healthcare. Voicemail left.

## 2024-05-22 RX ORDER — TIRZEPATIDE 2.5 MG/.5ML
2.5 INJECTION, SOLUTION SUBCUTANEOUS
Qty: 4 PEN | Refills: 0 | Status: SHIPPED | OUTPATIENT
Start: 2024-05-22

## 2024-05-22 NOTE — TELEPHONE ENCOUNTER
Care Due:                  Date            Visit Type   Department     Provider  --------------------------------------------------------------------------------                                EP -                              PRIMARY      [facility] PRIMARY  Last Visit: 05-      CARE ([facility])   RACHEL Laws  Next Visit: None Scheduled  None         None Found                                                            Last  Test          Frequency    Reason                     Performed    Due Date  --------------------------------------------------------------------------------    Uric Acid...  12 months..  allopurinoL, colchicine..  07- 07-    [facility] Embedded Care Due Messages. Reference number: [id number].   5/22/2024 9:03:01 AM REMEDIOST

## 2024-06-07 ENCOUNTER — PATIENT MESSAGE (OUTPATIENT)
Dept: ADMINISTRATIVE | Facility: OTHER | Age: 49
End: 2024-06-07
Payer: COMMERCIAL

## 2024-06-14 ENCOUNTER — PATIENT MESSAGE (OUTPATIENT)
Dept: PRIMARY CARE CLINIC | Facility: CLINIC | Age: 49
End: 2024-06-14
Payer: COMMERCIAL

## 2024-06-14 DIAGNOSIS — E66.01 MORBID OBESITY WITH BMI OF 45.0-49.9, ADULT: Primary | ICD-10-CM

## 2024-06-16 PROBLEM — I70.0 AORTIC ATHEROSCLEROSIS: Status: ACTIVE | Noted: 2024-06-16

## 2024-06-16 PROBLEM — I51.7 ASYMMETRIC SEPTAL HYPERTROPHY: Status: RESOLVED | Noted: 2024-05-14 | Resolved: 2024-06-16

## 2024-06-16 PROBLEM — I42.2 ASYMMETRIC SEPTAL HYPERTROPHY: Status: RESOLVED | Noted: 2024-05-14 | Resolved: 2024-06-16

## 2024-06-21 ENCOUNTER — OFFICE VISIT (OUTPATIENT)
Dept: PRIMARY CARE CLINIC | Facility: CLINIC | Age: 49
End: 2024-06-21
Payer: COMMERCIAL

## 2024-06-21 ENCOUNTER — PATIENT MESSAGE (OUTPATIENT)
Dept: PRIMARY CARE CLINIC | Facility: CLINIC | Age: 49
End: 2024-06-21

## 2024-06-21 DIAGNOSIS — E66.01 CLASS 3 SEVERE OBESITY DUE TO EXCESS CALORIES WITHOUT SERIOUS COMORBIDITY WITH BODY MASS INDEX (BMI) OF 45.0 TO 49.9 IN ADULT: Primary | ICD-10-CM

## 2024-06-21 DIAGNOSIS — R73.03 PREDIABETES: ICD-10-CM

## 2024-06-21 DIAGNOSIS — I10 ESSENTIAL HYPERTENSION: ICD-10-CM

## 2024-06-21 DIAGNOSIS — G47.33 OSA (OBSTRUCTIVE SLEEP APNEA): ICD-10-CM

## 2024-06-21 DIAGNOSIS — E78.2 MIXED HYPERLIPIDEMIA: ICD-10-CM

## 2024-06-21 PROBLEM — I70.0 AORTIC ATHEROSCLEROSIS: Status: RESOLVED | Noted: 2024-06-16 | Resolved: 2024-06-21

## 2024-06-21 RX ORDER — TIRZEPATIDE 2.5 MG/.5ML
2.5 INJECTION, SOLUTION SUBCUTANEOUS
Qty: 4 PEN | Refills: 3 | Status: SHIPPED | OUTPATIENT
Start: 2024-06-21

## 2024-06-21 NOTE — PROGRESS NOTES
The patient location is: [place]  The chief complaint leading to consultation is: F/u    Visit type: audiovisual        Fitz Venegas Jr.  1975        Subjective     Chief Complaint: F/u    History of Present Illness:  Mr. Fitz Venegas Jr. is a 49 y.o. male who presents for virtual visit for f/u.    Started on Zepbound. Doing well.   Eating very healthily. Avoiding carbs and red meat.   Has lost some weight in last 2 weeks.     Some mild constipation since starting.    Was 368 in clinic.   Now he is 353 lbs.     Lab Results   Component Value Date    HGBA1C 5.8 (H) 05/09/2024        Review of Systems   Constitutional:  Positive for weight loss. Negative for chills, fever and malaise/fatigue.   HENT:  Negative for hearing loss.    Eyes:  Negative for discharge.   Respiratory:  Negative for wheezing.    Cardiovascular:  Negative for chest pain and palpitations.   Gastrointestinal:  Negative for blood in stool, constipation, diarrhea and vomiting.   Genitourinary:  Negative for hematuria and urgency.   Musculoskeletal:  Negative for neck pain.   Neurological:  Negative for weakness and headaches.   Endo/Heme/Allergies:  Negative for polydipsia.        PAST HISTORY:     Past Medical History:   Diagnosis Date    Gout, unspecified     Kidney stones     Obesity (BMI 30-39.9) 04/27/2016    CASANDRA (obstructive sleep apnea)     Septic joint of left knee joint 2011       Past Surgical History:   Procedure Laterality Date    COLONOSCOPY N/A 4/12/2023    Procedure: COLONOSCOPY;  Surgeon: Fitz Lucia MD;  Location: [address]);  Service: Gastroenterology;  Laterality: N/A;  4/3 instructions to portal-st  precall done/arrival time of 830 confirmed/mleone lpn    CORONARY ANGIOGRAPHY N/A 5/3/2019    Procedure: ANGIOGRAM, CORONARY ARTERY;  Surgeon: Joey Carter MD;  Location: [facility] CATH LAB;  Service: Cardiology;  Laterality: N/A;    KNEE ARTHROSCOPY Left     LEFT HEART CATHETERIZATION Right 5/3/2019    Procedure: Left  heart cath;  Surgeon: Joey Carter MD;  Location: [facility] CATH LAB;  Service: Cardiology;  Laterality: Right;       Family History   Problem Relation Name Age of Onset    Heart attack Father  53    Heart disease Paternal Uncle          coronary stents    Coronary artery disease Paternal Grandfather          CABG and multiple stents    Hypertension Maternal Uncle           MEDICATIONS & ALLERGIES:     Current Outpatient Medications on File Prior to Visit   Medication Sig    allopurinoL (ZYLOPRIM) 100 MG tablet Take 1 tablet (100 mg total) by mouth once daily.    azelastine (ASTELIN) 137 mcg (0.1 %) nasal spray 1 spray (137 mcg total) by Nasal route 2 (two) times daily.    colchicine (COLCRYS) 0.6 mg tablet Take 2 tablets (1.2 mg total) by mouth once daily for 1 day, THEN 1 tablet (0.6 mg total) once daily. The 0.6 mg daily is to help prevent future flares while the allopurinol kicks in..    ketorolac (TORADOL) 10 mg tablet Take 1 tablet (10 mg total) by mouth 3 (three) times daily as needed for Pain.    tamsulosin (FLOMAX) 0.4 mg Cap Take 1 capsule (0.4 mg total) by mouth once daily.    [DISCONTINUED] tirzepatide, weight loss, (ZEPBOUND) 2.5 mg/0.5 mL PnIj Inject 2.5 mg into the skin every 7 days.     No current facility-administered medications on file prior to visit.       Review of patient's allergies indicates:  No Known Allergies    OBJECTIVE:     There is no height or weight on file to calculate BMI.     Physical Exam:  Physical Exam  Constitutional:       General: He is not in acute distress.     Appearance: Normal appearance. He is not ill-appearing, toxic-appearing or diaphoretic.      Comments: Limited 2/2 Virtual Exam   HENT:      Head: Normocephalic and atraumatic.   Eyes:      Conjunctiva/sclera: Conjunctivae normal.   Pulmonary:      Effort: Pulmonary effort is normal. No respiratory distress.   Neurological:      Mental Status: He is alert and oriented to person, place, and time. Mental status is at  "baseline.   Psychiatric:         Mood and Affect: Mood normal.         Behavior: Behavior normal.            Laboratory  Lab Results   Component Value Date    WBC 5.86 05/09/2024    HGB 17.3 05/09/2024    HCT 51.1 05/09/2024    MCV 94 05/09/2024     05/09/2024     Lab Results   Component Value Date     (H) 05/09/2024     05/09/2024    K 4.1 05/09/2024     05/09/2024    CO2 23 05/09/2024    BUN 11 05/09/2024    CREATININE 1.0 05/09/2024    CALCIUM 9.7 05/09/2024    MG 1.9 05/03/2019     No results found for: "INR", "PROTIME"  Lab Results   Component Value Date    HGBA1C 5.8 (H) 05/09/2024             ASSESSMENT & PLAN:   Mr. Fitz Venegas Jr. is a 49 y.o. male who was seen today for f/u. Doing very well on GLP-1.      1. Class 3 severe obesity due to excess calories without serious comorbidity with body mass index (BMI) of 45.0 to 49.9 in adult  -     tirzepatide, weight loss, (ZEPBOUND) 2.5 mg/0.5 mL PnIj; Inject 2.5 mg into the skin every 7 days.  Dispense: 4 Pen; Refill: 3  -     COMPREHENSIVE METABOLIC PANEL; Future; Expected date: 06/21/2024  -     HEMOGLOBIN A1C; Future; Expected date: 06/21/2024    2. Prediabetes  -     tirzepatide, weight loss, (ZEPBOUND) 2.5 mg/0.5 mL PnIj; Inject 2.5 mg into the skin every 7 days.  Dispense: 4 Pen; Refill: 3  -     COMPREHENSIVE METABOLIC PANEL; Future; Expected date: 06/21/2024  -     HEMOGLOBIN A1C; Future; Expected date: 06/21/2024    3. Essential hypertension  -     tirzepatide, weight loss, (ZEPBOUND) 2.5 mg/0.5 mL PnIj; Inject 2.5 mg into the skin every 7 days.  Dispense: 4 Pen; Refill: 3  -     COMPREHENSIVE METABOLIC PANEL; Future; Expected date: 06/21/2024  -     HEMOGLOBIN A1C; Future; Expected date: 06/21/2024    4. Mixed hyperlipidemia  -     tirzepatide, weight loss, (ZEPBOUND) 2.5 mg/0.5 mL PnIj; Inject 2.5 mg into the skin every 7 days.  Dispense: 4 Pen; Refill: 3  -     LIPID PANEL; Future; Expected date: 06/21/2024  -     " COMPREHENSIVE METABOLIC PANEL; Future; Expected date: 06/21/2024  -     HEMOGLOBIN A1C; Future; Expected date: 06/21/2024    5. CASANDRA (obstructive sleep apnea)  -     tirzepatide, weight loss, (ZEPBOUND) 2.5 mg/0.5 mL PnIj; Inject 2.5 mg into the skin every 7 days.  Dispense: 4 Pen; Refill: 3  -     COMPREHENSIVE METABOLIC PANEL; Future; Expected date: 06/21/2024  -     HEMOGLOBIN A1C; Future; Expected date: 06/21/2024           Belia Laws MD  Internal Medicine          Face to Face time with patient: 13  16 minutes of total time spent on the encounter, which includes face to face time and non-face to face time preparing to see the patient (eg, review of tests), Obtaining and/or reviewing separately obtained history, Documenting clinical information in the electronic or other health record, Independently interpreting results (not separately reported) and communicating results to the patient/family/caregiver, or Care coordination (not separately reported).         Each patient to whom he or she provides medical services by telemedicine is:  (1) informed of the relationship between the physician and patient and the respective role of any other health care provider with respect to management of the patient; and (2) notified that he or she may decline to receive medical services by telemedicine and may withdraw from such care at any time.    Portions of this note may have been generated using voice recognition software.  Please excuse any spelling/grammatical errors. Occasional wrong-word or sound-a-like substitutions may have also occurred due to the inherent limitations of voice recognition software. Read the chart carefully and recognize, using context, where substitutions have occurred.    Answers submitted by the patient for this visit:  Review of Systems Questionnaire (Submitted on 6/21/2024)  activity change: No  unexpected weight change: No  rhinorrhea: No  trouble swallowing: No  visual disturbance: No  chest  tightness: No  polyuria: No  difficulty urinating: No  joint swelling: No  arthralgias: No  confusion: No  dysphoric mood: No

## 2024-07-02 ENCOUNTER — PATIENT MESSAGE (OUTPATIENT)
Dept: PRIMARY CARE CLINIC | Facility: CLINIC | Age: 49
End: 2024-07-02
Payer: COMMERCIAL

## 2024-07-02 DIAGNOSIS — I10 ESSENTIAL HYPERTENSION: ICD-10-CM

## 2024-07-02 DIAGNOSIS — E66.01 CLASS 3 SEVERE OBESITY DUE TO EXCESS CALORIES WITHOUT SERIOUS COMORBIDITY WITH BODY MASS INDEX (BMI) OF 45.0 TO 49.9 IN ADULT: ICD-10-CM

## 2024-07-02 DIAGNOSIS — G47.33 OSA (OBSTRUCTIVE SLEEP APNEA): ICD-10-CM

## 2024-07-02 DIAGNOSIS — E78.2 MIXED HYPERLIPIDEMIA: ICD-10-CM

## 2024-07-02 DIAGNOSIS — R73.03 PREDIABETES: ICD-10-CM

## 2024-07-02 RX ORDER — TIRZEPATIDE 2.5 MG/.5ML
2.5 INJECTION, SOLUTION SUBCUTANEOUS
Qty: 4 PEN | Refills: 3 | OUTPATIENT
Start: 2024-07-02

## 2024-07-02 RX ORDER — TIRZEPATIDE 5 MG/.5ML
5 INJECTION, SOLUTION SUBCUTANEOUS
Qty: 4 PEN | Refills: 3 | Status: SHIPPED | OUTPATIENT
Start: 2024-07-02

## 2024-07-02 RX ORDER — TIRZEPATIDE 2.5 MG/.5ML
2.5 INJECTION, SOLUTION SUBCUTANEOUS
Qty: 4 PEN | Refills: 3 | Status: SHIPPED | OUTPATIENT
Start: 2024-07-02 | End: 2024-07-02

## 2024-07-02 NOTE — TELEPHONE ENCOUNTER
----- Message from Bennett Nayak sent at 7/2/2024 10:56 AM CDT -----  Contact: [phone]@[email or web address]  Good morning [facility] would like a call back from the doc to give a verbal order to compound one of the patient med. Please call them to advise [phone]

## 2024-07-02 NOTE — TELEPHONE ENCOUNTER
No care due was identified.  [facility] Embedded Care Due Messages. Reference number: [id number].   7/02/2024 8:33:00 AM CDT

## 2024-07-02 NOTE — TELEPHONE ENCOUNTER
No care due was identified.  [facility] Embedded Care Due Messages. Reference number: [id number].   7/02/2024 1:25:29 PM CDT

## 2024-07-12 ENCOUNTER — OFFICE VISIT (OUTPATIENT)
Dept: INTERNAL MEDICINE | Facility: CLINIC | Age: 49
End: 2024-07-12
Payer: COMMERCIAL

## 2024-07-12 ENCOUNTER — PATIENT MESSAGE (OUTPATIENT)
Dept: INTERNAL MEDICINE | Facility: CLINIC | Age: 49
End: 2024-07-12

## 2024-07-12 DIAGNOSIS — E66.01 CLASS 3 SEVERE OBESITY DUE TO EXCESS CALORIES WITHOUT SERIOUS COMORBIDITY WITH BODY MASS INDEX (BMI) OF 45.0 TO 49.9 IN ADULT: Primary | ICD-10-CM

## 2024-07-12 RX ORDER — TIRZEPATIDE 5 MG/.5ML
5 INJECTION, SOLUTION SUBCUTANEOUS
Qty: 2 ML | Refills: 0 | Status: SHIPPED | OUTPATIENT
Start: 2024-07-12

## 2024-07-12 RX ORDER — TIRZEPATIDE 7.5 MG/.5ML
7.5 INJECTION, SOLUTION SUBCUTANEOUS
Qty: 2 ML | Refills: 1 | Status: SHIPPED | OUTPATIENT
Start: 2024-08-09

## 2024-07-12 NOTE — PROGRESS NOTES
"Patient ID: Fitz Venegas Jr. is a 49 y.o. White male    Subjective  Chief Complaint: patient presents for medical weight loss management.    Contraindications to GLP-1 receptor agonist therapy:   Denies personal or family history of MTC, personal history of MEN2, history of allergic reaction while taking a GLP-1 receptor agonist, and history of pancreatitis while taking a GLP-1 receptor agonist     Co-morbidities: HTN, DLD, prediabetes, CASANDRA    History of weight loss therapy:  Reports has been on Zepbound for a month and a half. Started with 2.5 mg, now on 5 mg weekly. Has taken 2 doses of 5 mg. Would like to continue with Zepbound today.    Weight loss history:  Starting weight:    5/9/2024   Vitals - 1 value per visit    Weight (lb) 368.39 (H)    Weight (kg) 167.1 (H)    Height 6' 2" (1.88 m)    BMI (Calculated) 47.3       Current weight:    7/11/2024   Recent Readings    Weight (lbs) 338 lb    BMI 44.59 BMI        % weight loss since GLP-1 initiation: 8.1%    Tolerance to current therapy:  States had one instance of nausea after the day after first dose of 5 mg  Denies vomiting, diarrhea, constipation, abdominal pain    Objective  Lab Results   Component Value Date     05/09/2024     11/03/2023     07/24/2023     Lab Results   Component Value Date    K 4.1 05/09/2024    K 3.9 11/03/2023    K 4.3 07/24/2023     Lab Results   Component Value Date     05/09/2024     11/03/2023     07/24/2023     Lab Results   Component Value Date    CO2 23 05/09/2024    CO2 23 11/03/2023    CO2 20 (L) 07/24/2023     Lab Results   Component Value Date    BUN 11 05/09/2024    BUN 10 11/03/2023    BUN 15 07/24/2023     Lab Results   Component Value Date     (H) 05/09/2024     (H) 11/03/2023     (H) 07/24/2023     Lab Results   Component Value Date    CALCIUM 9.7 05/09/2024    CALCIUM 9.1 11/03/2023    CALCIUM 9.2 07/24/2023     Lab Results   Component Value Date    PROT 8.0 " 05/09/2024    PROT 7.4 11/03/2023    PROT 7.5 07/24/2023     Lab Results   Component Value Date    ALBUMIN 4.0 05/09/2024    ALBUMIN 3.8 11/03/2023    ALBUMIN 3.9 07/24/2023     Lab Results   Component Value Date    BILITOT 0.8 05/09/2024    BILITOT 0.7 11/03/2023    BILITOT 0.8 07/24/2023     Lab Results   Component Value Date    AST 23 05/09/2024    AST 21 11/03/2023    AST 17 07/24/2023     Lab Results   Component Value Date    ALT 37 05/09/2024    ALT 41 11/03/2023    ALT 37 07/24/2023     Lab Results   Component Value Date    ANIONGAP 14 05/09/2024    ANIONGAP 9 11/03/2023    ANIONGAP 12 07/24/2023     Lab Results   Component Value Date    CREATININE 1.0 05/09/2024    CREATININE 1.0 11/03/2023    CREATININE 0.9 07/24/2023     Lab Results   Component Value Date    EGFRNORACEVR >60.0 05/09/2024    EGFRNORACEVR >60 11/03/2023    EGFRNORACEVR >60.0 07/24/2023         Assessment/Plan  -Continuation of GLP-1 RA therapy  -Will continue Zepbound 5 mg once weekly for additional 1 month  -Then increase to Zepbound 7.5 mg   -RTC in 3 months to assess progress      Patient consented to pharmacist management via collaborative practice.

## 2024-07-12 NOTE — PATIENT INSTRUCTIONS
ZEPBOUND (ZEHP-bownd) (tirzepatide) injection, for subcutaneous use   What is the most important information I should know about ZEPBOUND? ZEPBOUND may cause serious side effects, including:   Possible thyroid tumors, including cancer. Tell your healthcare provider if you get a lump or swelling in your neck, hoarseness, trouble swallowing, or shortness of breath. These may be symptoms of thyroid cancer. In studies with rats, ZEPBOUND and medicines that work like ZEPBOUND caused thyroid tumors, including thyroid cancer. It is not known if ZEPBOUND will cause thyroid tumors, or a type of thyroid cancer called medullary thyroid carcinoma (MTC) in people.   Do not use ZEPBOUND if you or any of your family have ever had a type of thyroid cancer called MTC, or if you have an endocrine system condition called Multiple Endocrine Neoplasia syndrome type 2 (MEN 2).   What is ZEPBOUND?   ZEPBOUND is an injectable prescription medicine that may help adults with obesity, or with excess weight (overweight) who also have weight-related medical problems, lose weight and keep it off.   ZEPBOUND should be used with a reduced-calorie diet and increased physical activity.   ZEPBOUND contains tirzepatide and should not be used with other tirzepatide-containing products or any GLP-1 receptor agonist medicines.   It is not known if ZEPBOUND is safe and effective when taken with other prescription, over-the-counter, or herbal weight loss products.   It is not known if ZEPBOUND can be used in people who have had pancreatitis.   It is not known if ZEPBOUND is safe and effective for use in children under 18 years of age.   Do not use ZEPBOUND if:   you or any of your family have ever had a type of thyroid cancer called MTC or if you have an endocrine system condition called MEN 2.   you have had a serious allergic reaction to tirzepatide or any of the ingredients in ZEPBOUND. See the end of this Medication Guide for a complete list of  ingredients in ZEPBOUND.   Before using ZEPBOUND, tell your healthcare provider about all of your medical conditions, including if you:   have or have had problems with your pancreas or kidneys.   have severe problems with your stomach, such as slowed emptying of your stomach (gastroparesis) or problems with digesting food.   have a history of diabetic retinopathy.   are pregnant or plan to become pregnant. ZEPBOUND may harm your unborn baby. Tell your healthcare provider if you become pregnant while using ZEPBOUND.   Pregnancy Exposure Registry: There will be a pregnancy exposure registry for women who have taken ZEPBOUND during pregnancy. The purpose of this registry is to collect information about the health of you and your baby. Talk to your healthcare provider about how you can take part in this registry or you may contact [facility] at 2-888-YwjgbVz ([phone]).   Birth control pills by mouth may not work as well while using ZEPBOUND. If you take birth control pills by mouth, your healthcare provider may recommend another type of birth control for 4 weeks after you start ZEPBOUND and for 4 weeks after each increase in your dose of ZEPBOUND. Talk to your healthcare provider about birth control methods that may be right for you while using ZEPBOUND.   are breastfeeding or plan to breastfeed. It is not known if ZEPBOUND passes into your breast milk. Talk to your healthcare provider about the best way to feed your baby while using ZEPBOUND.   Tell your healthcare provider about all the medicines you take, including prescription and over-the-counter medicines, vitamins, and herbal supplements. ZEPBOUND may affect the way some medicines work, and some medicines may affect the way ZEPBOUND works.   Before using ZEPBOUND, tell your healthcare provider if you are taking medicines to treat diabetes including insulin or sulfonylureas which could increase your risk of low blood sugar. Talk to your  healthcare provider about low blood sugar levels and how to manage them.   Know the medicines you take. Keep a list of them to show your healthcare provider and pharmacist when you get a new medicine.   How should I use ZEPBOUND?   Read the Instructions for Use that comes with ZEPBOUND.   Use ZEPBOUND exactly as your healthcare provider tells you to. A healthcare provider should show you how to prepare to inject your dose of ZEPBOUND before injecting the first time.   ZEPBOUND is injected under the skin (subcutaneously) of your stomach (abdomen), thigh, or upper arm.   Use ZEPBOUND 1 time each week, at any time of the day.   You may change the day of the week you use ZEPBOUND as long as the time between the 2 doses is at least 3 days (72 hours).   If you miss a dose of ZEPBOUND, take the missed dose as soon as possible within 4 days (96 hours) after the missed dose. If more than 4 days have passed, skip the missed dose and take your next dose on the regularly scheduled day. Do not take 2 doses of ZEPBOUND within 3 days (72 hours) of each other.   ZEPBOUND may be taken with or without food.   Change (rotate) your injection site with each weekly injection. You may use the same area of your body but be sure to choose a different injection site in that area. Do not use the same site for each injection.   In case of overdose, get medical help or contact a [facility] expert right away at [phone]. Advice is also available online at poisonhelp.org.   What are the possible side effects of ZEPBOUND?   ZEPBOUND may cause serious side effects, including:   See What is the most important information I should know about ZEPBOUND?   severe stomach problems. Stomach problems, sometimes severe, have been reported in people who use ZEPBOUND. Tell your healthcare provider if you have stomach problems that are severe or will not go away.   kidney problems (kidney failure). Diarrhea, nausea, and vomiting may cause a loss of  fluids (dehydration) which may cause kidney problems. It is important for you to drink fluids to help reduce your chance of dehydration.   gallbladder problems. Gallbladder problems have happened in some people who use ZEPBOUND. Tell your healthcare provider right away if you get symptoms of gallbladder problems which may include:   pain in your upper stomach (abdomen)   yellowing of skin or eyes (jaundice)   fever   jerel-colored stools   inflammation of your pancreas (pancreatitis). Stop using ZEPBOUND and call your healthcare provider right away if you have severe pain in your stomach area (abdomen) that will not go away, with or without vomiting. You may feel the pain from your abdomen to your back.   serious allergic reactions. Stop using ZEPBOUND and get medical help right away if you have any symptoms of a serious allergic reaction including:   swelling of your face, lips, tongue or throat   fainting or feeling dizzy   problems breathing or swallowing   very rapid heartbeat   severe rash or itching   low blood sugar (hypoglycemia). Your risk for getting low blood sugar may be higher if you use ZEPBOUND with medicines that can cause low blood sugar, such as a sulfonylurea or insulin. Signs and symptoms of low blood sugar may include:   dizziness or light-headedness   blurred vision   anxiety, irritability, or mood changes   sweating   slurred speech   hunger   confusion or drowsiness   shakiness   weakness   headache   fast heartbeat   feeling jittery   changes in vision in patients with type 2 diabetes. Tell your healthcare provider if you have changes in vision during treatment with ZEPBOUND.   depression or thoughts of suicide. You should pay attention to any changes in your mood, behaviors, feelings, or thoughts. Call your healthcare provider right away if you have any changes to your mental health that are new, worse, or worry you.   The most common side effects of ZEPBOUND include:   nausea   stomach  (abdominal) pain   allergic reactions   diarrhea   indigestion   belching   vomiting   injection site reactions   hair loss   constipation   feeling tired   heartburn   Talk to your healthcare provider if you have any side effect that bothers you or that does not go away. These are not all the possible side effects of ZEPBOUND. Call your healthcare provider for medical advice about side effects. You may report side effects to FDA at 0-212-NGP-6569.   How should I store ZEPBOUND?   Store ZEPBOUND in the refrigerator between 36°F to 46°F (2°C to 8°C). Store ZEPBOUND in the original carton until use to protect it from light.   If needed, each single-dose pen can be stored at room temperature up to 86°F (30°C) for up to 21 days. If ZEPBOUND is stored at room temperature, it should not be returned to the refrigerator.   Discard if not used within 21 days after removing from the refrigerator.   Do not freeze ZEPBOUND. Do not use ZEPBOUND if frozen.   Keep ZEPBOUND and all medicines out of the reach of children.   General information about the safe and effective use of ZEPBOUND.   Medicines are sometimes prescribed for purposes other than those listed in a Medication Guide. Do not use ZEPBOUND for a condition for which it was not prescribed. Do not give ZEPBOUND to other people, even if they have the same condition you have. It may harm them. You can ask your pharmacist or healthcare provider for information about ZEPBOUND that is written for health professionals.   What are the ingredients in ZEPBOUND?   Active ingredient: tirzepatide   Inactive ingredients: sodium chloride, sodium phosphate dibasic heptahydrate, and water for injection. Hydrochloric acid solution and/or sodium hydroxide solution may have been added to adjust the pH.   For more information, go to www.[employer] or call [phone].

## 2024-07-25 NOTE — PROGRESS NOTES
Chart has been dictated using voice recognition software.  It is not been reviewed carefully for any transcriptional errors due to this technology.   Subjective:   Patient ID:  Fitz Venegas Jr. is a 49 y.o. male who presents for evaluation of No chief complaint on file.      HPI:  Patient with obesity, sleep apnea, gout presents for cardiovascular evaluation because of a family history cardiac disease.  The patient had an episode chest pain May-2019 and underwent coronary angiography which showed no evidence of coronary obstruction.    Patient had a transthoracic echocardiogram (14-May-2024) showing:    Left Ventricle: The left ventricle is normal in size. Ventricular mass is normal. Mild basal septal thickening. There is mild asymmetric hypertrophy. Normal wall motion. There is normal systolic function. There is normal diastolic function. Normal left ventricular filling pressure. Tissue Doppler velocity is normal.    Right Ventricle: Normal right ventricular cavity size. Wall thickness is normal. Systolic function is normal.    No significant valvular disease noted    Patient has no symptoms of cardiac ischemia, heart failure, or significant arrhythmias. Patient denies any dyspnea at rest or on exertion, orthopnea, PND, or edema.  Patient denies any palpitations, lightheadedness, or syncope. Patient denies lower extremity claudication.     Patient has lost 40 pounds over the last 8 weeks since starting tirzepatide. Had hypertension while his weight was up, but now normal.  Exercises for 30 min/day.          Cardiac risk factors: prediabetes, hyperlipidemia, hypertension, obesity, sedentary lifestyle    Past Medical History:   Diagnosis Date    Gout, unspecified     Kidney stones     Obesity (BMI 30-39.9) 04/27/2016    CASANDRA (obstructive sleep apnea)     Septic joint of left knee joint 2011       Past Surgical History:   Procedure Laterality Date    COLONOSCOPY N/A 4/12/2023    Procedure: COLONOSCOPY;  Surgeon:  Fitz Lucia MD;  Location: [facility] ENDO (4TH FLR);  Service: Gastroenterology;  Laterality: N/A;  4/3 instructions to portal-st  precall done/arrival time of 830 confirmed/mleone lpn    CORONARY ANGIOGRAPHY N/A 5/3/2019    Procedure: ANGIOGRAM, CORONARY ARTERY;  Surgeon: Joey Carter MD;  [place] [facility] [place];  Service: Cardiology;  Laterality: N/A;    KNEE ARTHROSCOPY Left     LEFT HEART CATHETERIZATION Right 5/3/2019    Procedure: Left heart cath;  Surgeon: Joey Carter MD;  Location: [facility] [place] [facility];  Service: Cardiology;  Laterality: Right;       Social History     Tobacco Use    Smoking status: Former     Types: Cigarettes    Smokeless tobacco: Never    Tobacco comments:     quit @ age 32; smoked x 10 years   Substance Use Topics    Alcohol use: Yes     Alcohol/week: 2.0 standard drinks of alcohol     Types: 2 Shots of liquor per week     Comment: weekends    Drug use: No       Outpatient Medications Prior to Visit   Medication Sig Dispense Refill    allopurinoL (ZYLOPRIM) 100 MG tablet Take 1 tablet (100 mg total) by mouth once daily. 90 tablet 3    azelastine (ASTELIN) 137 mcg (0.1 %) nasal spray 1 spray (137 mcg total) by Nasal route 2 (two) times daily. 30 mL 11    colchicine (COLCRYS) 0.6 mg tablet Take 2 tablets (1.2 mg total) by mouth once daily for 1 day, THEN 1 tablet (0.6 mg total) once daily. The 0.6 mg daily is to help prevent future flares while the allopurinol kicks in.. 92 tablet 0    ketorolac (TORADOL) 10 mg tablet Take 1 tablet (10 mg total) by mouth 3 (three) times daily as needed for Pain. 20 tablet 0    tamsulosin (FLOMAX) 0.4 mg Cap Take 1 capsule (0.4 mg total) by mouth once daily. 30 capsule 0    tirzepatide, weight loss, (ZEPBOUND) 5 mg/0.5 mL PnIj Inject 5 mg into the skin every 7 days. 2 mL 0    [START ON 8/9/2024] tirzepatide, weight loss, (ZEPBOUND) 7.5 mg/0.5 mL PnIj Inject 7.5 mg into the skin every 7 days. 2 mL 1     No facility-administered medications prior to visit.  "      Review of patient's allergies indicates:  No Known Allergies    ROS - The patient's review of systems is negative for any significant constitutional, respiratory, endocrine, hematologic, musculoskeletal or neurologic symptoms.    Objective:   Physical Exam  Constitutional:       Appearance: He is well-developed. He is obese.      Comments: /72   Pulse 61   Ht 6' 1" (1.854 m)   Wt (!) 152.4 kg (335 lb 15.7 oz)   BMI 44.33 kg/m²      Neck:      Vascular: No carotid bruit or JVD.   Cardiovascular:      Rate and Rhythm: Normal rate and regular rhythm.      Pulses:           Carotid pulses are 2+ on the right side and 2+ on the left side.       Radial pulses are 2+ on the right side and 2+ on the left side.        Popliteal pulses are 2+ on the right side and 2+ on the left side.        Dorsalis pedis pulses are 0 on the right side and 2+ on the left side.        Posterior tibial pulses are 0 on the right side and 2+ on the left side.      Heart sounds: Normal heart sounds. No murmur heard.     No friction rub. No gallop.      Comments: Pedal pulses evaluated with socks on  Pulmonary:      Effort: Pulmonary effort is normal.      Breath sounds: Normal breath sounds. No wheezing or rales.   Abdominal:      General: Bowel sounds are normal. There is no abdominal bruit.      Palpations: Abdomen is soft. There is no hepatomegaly.      Tenderness: There is no abdominal tenderness.   Musculoskeletal:      Cervical back: Neck supple.   Skin:     Nails: There is no clubbing.   Neurological:      Mental Status: He is alert and oriented to person, place, and time.           Lab Results   Component Value Date     05/09/2024    K 4.1 05/09/2024    BUN 11 05/09/2024    CREATININE 1.0 05/09/2024    EGFRNORACEVR >60.0 05/09/2024     (H) 05/09/2024    HGBA1C 5.8 (H) 05/09/2024    CHOL 170 05/09/2024    TRIG 303 (H) 05/09/2024    HDL 31 (L) 05/09/2024    LDLCALC 78.4 05/09/2024    HGB 17.3 05/09/2024    HCT " 51.1 05/09/2024    WBC 5.86 05/09/2024     05/09/2024     ASCVD+ 10 year cardiovascular risk =  3.0%  Assessment:     1. Essential hypertension    2. Family history of early CAD    3. Class 3 severe obesity due to excess calories without serious comorbidity with body mass index (BMI) of 45.0 to 49.9 in adult    4. CASANDRA (obstructive sleep apnea)    5. Aortic atherosclerosis      Patient has no symptoms of cardiac ischemia, heart failure, or significant arrhythmias.  It is very unlikely the patient has any significant coronary artery obstruction.  Risk factor modification was discussed with the patient including alterations to his diet, weight loss, excellent and exercise.  The patient has been losing weight with semaglutide.  He is committed to losing more weight.  He has been using CPAP for his obstructive sleep apnea.  His blood pressure has been well controlled since his weight has dropped.  After discussion with the patient, no further cardiovascular testing or additional treatment is needed at this time.      At this time, there is nothing further to add to this patient's care from a cardiovascular point of view. Unless the patient has further cardiovascular problems, there is no need for the patient to return for re-evaluation.  However, I would be happy to see the patient again if needed.   Plan:     Diagnoses and all orders for this visit:    Essential hypertension    Family history of early CAD    Class 3 severe obesity due to excess calories without serious comorbidity with body mass index (BMI) of 45.0 to 49.9 in adult    CASANDRA (obstructive sleep apnea)    Aortic atherosclerosis          Lorenzo White MD  Consultative Cardiology

## 2024-07-26 ENCOUNTER — OFFICE VISIT (OUTPATIENT)
Dept: CARDIOLOGY | Facility: CLINIC | Age: 49
End: 2024-07-26
Payer: COMMERCIAL

## 2024-07-26 VITALS
HEIGHT: 73 IN | SYSTOLIC BLOOD PRESSURE: 118 MMHG | HEART RATE: 61 BPM | DIASTOLIC BLOOD PRESSURE: 72 MMHG | WEIGHT: 315 LBS | BODY MASS INDEX: 41.75 KG/M2

## 2024-07-26 DIAGNOSIS — E66.01 CLASS 3 SEVERE OBESITY DUE TO EXCESS CALORIES WITHOUT SERIOUS COMORBIDITY WITH BODY MASS INDEX (BMI) OF 45.0 TO 49.9 IN ADULT: ICD-10-CM

## 2024-07-26 DIAGNOSIS — R06.02 SOB (SHORTNESS OF BREATH): ICD-10-CM

## 2024-07-26 DIAGNOSIS — I70.0 AORTIC ATHEROSCLEROSIS: ICD-10-CM

## 2024-07-26 DIAGNOSIS — G47.33 OSA (OBSTRUCTIVE SLEEP APNEA): ICD-10-CM

## 2024-07-26 DIAGNOSIS — I10 ESSENTIAL HYPERTENSION: Primary | ICD-10-CM

## 2024-07-26 DIAGNOSIS — Z82.49 FAMILY HISTORY OF HEART ATTACK: ICD-10-CM

## 2024-07-26 DIAGNOSIS — Z82.49 FAMILY HISTORY OF EARLY CAD: ICD-10-CM

## 2024-07-26 PROCEDURE — 3008F BODY MASS INDEX DOCD: CPT | Mod: CPTII,S$GLB,, | Performed by: INTERNAL MEDICINE

## 2024-07-26 PROCEDURE — 3078F DIAST BP <80 MM HG: CPT | Mod: CPTII,S$GLB,, | Performed by: INTERNAL MEDICINE

## 2024-07-26 PROCEDURE — 99999 PR PBB SHADOW E&M-EST. PATIENT-LVL IV: CPT | Mod: PBBFAC,,, | Performed by: INTERNAL MEDICINE

## 2024-07-26 PROCEDURE — 3074F SYST BP LT 130 MM HG: CPT | Mod: CPTII,S$GLB,, | Performed by: INTERNAL MEDICINE

## 2024-07-26 PROCEDURE — 3044F HG A1C LEVEL LT 7.0%: CPT | Mod: CPTII,S$GLB,, | Performed by: INTERNAL MEDICINE

## 2024-07-26 PROCEDURE — 1159F MED LIST DOCD IN RCRD: CPT | Mod: CPTII,S$GLB,, | Performed by: INTERNAL MEDICINE

## 2024-07-26 PROCEDURE — 99204 OFFICE O/P NEW MOD 45 MIN: CPT | Mod: S$GLB,,, | Performed by: INTERNAL MEDICINE

## 2024-07-26 PROCEDURE — 1160F RVW MEDS BY RX/DR IN RCRD: CPT | Mod: CPTII,S$GLB,, | Performed by: INTERNAL MEDICINE

## 2024-07-26 NOTE — Clinical Note
Thank you for referring Fitz Venegas Jr. for evaluation of cardiovascular risk factor evaluation. Please see my note for details of this encounter. If you have any questions, please contact me.  Thank you again for the referral.

## 2024-10-15 DIAGNOSIS — E66.01 CLASS 3 SEVERE OBESITY DUE TO EXCESS CALORIES WITHOUT SERIOUS COMORBIDITY WITH BODY MASS INDEX (BMI) OF 45.0 TO 49.9 IN ADULT: ICD-10-CM

## 2024-10-15 DIAGNOSIS — E66.813 CLASS 3 SEVERE OBESITY DUE TO EXCESS CALORIES WITHOUT SERIOUS COMORBIDITY WITH BODY MASS INDEX (BMI) OF 45.0 TO 49.9 IN ADULT: ICD-10-CM

## 2024-10-15 RX ORDER — TIRZEPATIDE 7.5 MG/.5ML
7.5 INJECTION, SOLUTION SUBCUTANEOUS
Qty: 2 ML | Refills: 0 | Status: ACTIVE | OUTPATIENT
Start: 2024-10-15

## 2024-10-31 ENCOUNTER — PATIENT MESSAGE (OUTPATIENT)
Dept: INTERNAL MEDICINE | Facility: CLINIC | Age: 49
End: 2024-10-31
Payer: COMMERCIAL

## 2024-11-12 DIAGNOSIS — E66.813 CLASS 3 SEVERE OBESITY DUE TO EXCESS CALORIES WITHOUT SERIOUS COMORBIDITY WITH BODY MASS INDEX (BMI) OF 45.0 TO 49.9 IN ADULT: ICD-10-CM

## 2024-11-12 DIAGNOSIS — E66.01 CLASS 3 SEVERE OBESITY DUE TO EXCESS CALORIES WITHOUT SERIOUS COMORBIDITY WITH BODY MASS INDEX (BMI) OF 45.0 TO 49.9 IN ADULT: ICD-10-CM

## 2024-11-12 RX ORDER — TIRZEPATIDE 7.5 MG/.5ML
7.5 INJECTION, SOLUTION SUBCUTANEOUS
Qty: 2 ML | Refills: 2 | Status: ACTIVE | OUTPATIENT
Start: 2024-11-12

## 2024-11-12 NOTE — TELEPHONE ENCOUNTER
Patient will be out of medication prior to next scheduled follow-up visit. [facility] has provided refills to hold over the patient until next appointment.

## 2024-12-12 ENCOUNTER — PATIENT MESSAGE (OUTPATIENT)
Dept: ADMINISTRATIVE | Facility: OTHER | Age: 49
End: 2024-12-12
Payer: COMMERCIAL

## 2025-01-11 ENCOUNTER — PATIENT MESSAGE (OUTPATIENT)
Dept: ADMINISTRATIVE | Facility: OTHER | Age: 50
End: 2025-01-11
Payer: COMMERCIAL

## 2025-02-07 DIAGNOSIS — E66.01 CLASS 3 SEVERE OBESITY DUE TO EXCESS CALORIES WITHOUT SERIOUS COMORBIDITY WITH BODY MASS INDEX (BMI) OF 45.0 TO 49.9 IN ADULT: ICD-10-CM

## 2025-02-07 DIAGNOSIS — E66.813 CLASS 3 SEVERE OBESITY DUE TO EXCESS CALORIES WITHOUT SERIOUS COMORBIDITY WITH BODY MASS INDEX (BMI) OF 45.0 TO 49.9 IN ADULT: ICD-10-CM

## 2025-02-07 RX ORDER — TIRZEPATIDE 7.5 MG/.5ML
7.5 INJECTION, SOLUTION SUBCUTANEOUS
Qty: 2 ML | Refills: 2 | OUTPATIENT
Start: 2025-02-07

## 2025-02-07 NOTE — PROGRESS NOTES
"Patient ID: Fitz Venegas Jr. is a 50 y.o. White male    Subjective  Chief Complaint: patient presents for medical weight loss management.    Co-morbidities: HTN, DLD, prediabetes, CASANDRA    HPI: Patient continued Zepbound with [facility] in July 2024 and is currently managed on Zepbound 7.5 mg. Pt was on Zepbound 5 mg when presenting to the [facility].    Tolerance to current therapy:  Denies nausea, vomiting, diarrhea, constipation, abdominal pain    Weight loss history:  Starting weight:    5/9/2024   Vitals - 1 value per visit    Weight (lb) 368.39 (H)    Weight (kg) 167.1 (H)    Height 6' 2" (1.88 m)    BMI (Calculated) 47.3    Current weight:    1/26/2025   Recent Readings    Weight (lbs) 315 lb    BMI 41.55 BMI    % weight loss since GLP-1 initiation: 14.5 %    Objective  Lab Results   Component Value Date     05/09/2024     11/03/2023     07/24/2023     Lab Results   Component Value Date    K 4.1 05/09/2024    K 3.9 11/03/2023    K 4.3 07/24/2023     Lab Results   Component Value Date     05/09/2024     11/03/2023     07/24/2023     Lab Results   Component Value Date    CO2 23 05/09/2024    CO2 23 11/03/2023    CO2 20 (L) 07/24/2023     Lab Results   Component Value Date    BUN 11 05/09/2024    BUN 10 11/03/2023    BUN 15 07/24/2023     Lab Results   Component Value Date     (H) 05/09/2024     (H) 11/03/2023     (H) 07/24/2023     Lab Results   Component Value Date    CALCIUM 9.7 05/09/2024    CALCIUM 9.1 11/03/2023    CALCIUM 9.2 07/24/2023     Lab Results   Component Value Date    PROT 8.0 05/09/2024    PROT 7.4 11/03/2023    PROT 7.5 07/24/2023     Lab Results   Component Value Date    ALBUMIN 4.0 05/09/2024    ALBUMIN 3.8 11/03/2023    ALBUMIN 3.9 07/24/2023     Lab Results   Component Value Date    BILITOT 0.8 05/09/2024    BILITOT 0.7 11/03/2023    BILITOT 0.8 07/24/2023     Lab Results   Component Value Date    AST 23 " 05/09/2024    AST 21 11/03/2023    AST 17 07/24/2023     Lab Results   Component Value Date    ALT 37 05/09/2024    ALT 41 11/03/2023    ALT 37 07/24/2023     Lab Results   Component Value Date    ANIONGAP 14 05/09/2024    ANIONGAP 9 11/03/2023    ANIONGAP 12 07/24/2023     Lab Results   Component Value Date    CREATININE 1.0 05/09/2024    CREATININE 1.0 11/03/2023    CREATININE 0.9 07/24/2023     Lab Results   Component Value Date    EGFRNORACEVR >60.0 05/09/2024    EGFRNORACEVR >60 11/03/2023    EGFRNORACEVR >60.0 07/24/2023     Assessment/Plan  - Increase to Zepbound 10 mg SQ weekly  - RTC in 3 months for follow-up evaluation    Patient consented to pharmacist management via collaborative practice.

## 2025-02-11 ENCOUNTER — PATIENT MESSAGE (OUTPATIENT)
Dept: INTERNAL MEDICINE | Facility: CLINIC | Age: 50
End: 2025-02-11

## 2025-02-11 ENCOUNTER — OFFICE VISIT (OUTPATIENT)
Dept: INTERNAL MEDICINE | Facility: CLINIC | Age: 50
End: 2025-02-11
Payer: COMMERCIAL

## 2025-02-11 DIAGNOSIS — E66.01 OBESITY, CLASS III, BMI 40-49.9 (MORBID OBESITY): Primary | ICD-10-CM

## 2025-02-11 PROCEDURE — 99499 UNLISTED E&M SERVICE: CPT | Mod: 95,,,

## 2025-02-11 RX ORDER — TIRZEPATIDE 10 MG/.5ML
10 INJECTION, SOLUTION SUBCUTANEOUS
Qty: 2 ML | Refills: 2 | Status: ACTIVE | OUTPATIENT
Start: 2025-02-11

## 2025-03-08 ENCOUNTER — PATIENT MESSAGE (OUTPATIENT)
Dept: ADMINISTRATIVE | Facility: OTHER | Age: 50
End: 2025-03-08
Payer: COMMERCIAL

## 2025-05-02 ENCOUNTER — PATIENT MESSAGE (OUTPATIENT)
Dept: ADMINISTRATIVE | Facility: OTHER | Age: 50
End: 2025-05-02
Payer: COMMERCIAL

## 2025-05-05 DIAGNOSIS — E66.01 OBESITY, CLASS III, BMI 40-49.9 (MORBID OBESITY): ICD-10-CM

## 2025-05-05 RX ORDER — TIRZEPATIDE 10 MG/.5ML
10 INJECTION, SOLUTION SUBCUTANEOUS
Qty: 2 ML | Refills: 0 | Status: ACTIVE | OUTPATIENT
Start: 2025-05-05

## 2025-06-04 DIAGNOSIS — E66.01 OBESITY, CLASS III, BMI 40-49.9 (MORBID OBESITY): ICD-10-CM

## 2025-06-04 RX ORDER — TIRZEPATIDE 10 MG/.5ML
10 INJECTION, SOLUTION SUBCUTANEOUS
Qty: 2 ML | Refills: 0 | OUTPATIENT
Start: 2025-06-04

## 2025-06-05 ENCOUNTER — PATIENT MESSAGE (OUTPATIENT)
Dept: INTERNAL MEDICINE | Facility: CLINIC | Age: 50
End: 2025-06-05
Payer: COMMERCIAL

## 2025-06-05 DIAGNOSIS — E66.01 OBESITY, CLASS III, BMI 40-49.9 (MORBID OBESITY): ICD-10-CM

## 2025-06-05 RX ORDER — TIRZEPATIDE 10 MG/.5ML
10 INJECTION, SOLUTION SUBCUTANEOUS
Qty: 2 ML | Refills: 0 | Status: ACTIVE | OUTPATIENT
Start: 2025-06-05

## 2025-06-18 ENCOUNTER — PATIENT MESSAGE (OUTPATIENT)
Dept: INTERNAL MEDICINE | Facility: CLINIC | Age: 50
End: 2025-06-18

## 2025-06-18 ENCOUNTER — OFFICE VISIT (OUTPATIENT)
Dept: INTERNAL MEDICINE | Facility: CLINIC | Age: 50
End: 2025-06-18
Payer: COMMERCIAL

## 2025-06-18 DIAGNOSIS — E66.812 OBESITY, CLASS II, BMI 35-39.9: Primary | ICD-10-CM

## 2025-06-18 PROCEDURE — 99499 UNLISTED E&M SERVICE: CPT | Mod: 95,,,

## 2025-06-18 RX ORDER — TIRZEPATIDE 12.5 MG/.5ML
12.5 INJECTION, SOLUTION SUBCUTANEOUS
Qty: 2 ML | Refills: 2 | Status: ACTIVE | OUTPATIENT
Start: 2025-06-18

## 2025-06-18 NOTE — PROGRESS NOTES
"Patient ID: Fitz Venegas Jr. is a 50 y.o. White male    Subjective  Chief Complaint: patient presents for medical weight loss management.    Co-morbidities: HTN, DLD, prediabetes, CASANDAR    HPI: Patient [facility] with [facility] in July 2024 and is currently managed on Zepbound 10 mg. Pt was on Zepbound 5 mg when presenting to the [facility].    Tolerance to current therapy:  Denies nausea, vomiting, diarrhea, constipation, abdominal pain    Weight loss history:  Starting weight:    5/9/2024   Vitals - 1 value per visit    Weight (lb) 368.39 (H)    Weight (kg) 167.1 (H)    Height 6' 2" (1.88 m)    BMI (Calculated) 47.3    Current weight:    6/17/2025   Recent Readings    Weight (lbs) 295 lb    BMI 38.92 BMI    % weight loss since GLP-1 initiation: 19.9 %    Objective  Lab Results   Component Value Date     05/09/2024     11/03/2023     07/24/2023     Lab Results   Component Value Date    K 4.1 05/09/2024    K 3.9 11/03/2023    K 4.3 07/24/2023     Lab Results   Component Value Date     05/09/2024     11/03/2023     07/24/2023     Lab Results   Component Value Date    CO2 23 05/09/2024    CO2 23 11/03/2023    CO2 20 (L) 07/24/2023     Lab Results   Component Value Date    BUN 11 05/09/2024    BUN 10 11/03/2023    BUN 15 07/24/2023     Lab Results   Component Value Date     (H) 05/09/2024     (H) 11/03/2023     (H) 07/24/2023     Lab Results   Component Value Date    CALCIUM 9.7 05/09/2024    CALCIUM 9.1 11/03/2023    CALCIUM 9.2 07/24/2023     Lab Results   Component Value Date    PROT 8.0 05/09/2024    PROT 7.4 11/03/2023    PROT 7.5 07/24/2023     Lab Results   Component Value Date    ALBUMIN 4.0 05/09/2024    ALBUMIN 3.8 11/03/2023    ALBUMIN 3.9 07/24/2023     Lab Results   Component Value Date    BILITOT 0.8 05/09/2024    BILITOT 0.7 11/03/2023    BILITOT 0.8 07/24/2023     Lab Results   Component Value Date    AST 23 " 05/09/2024    AST 21 11/03/2023    AST 17 07/24/2023     Lab Results   Component Value Date    ALT 37 05/09/2024    ALT 41 11/03/2023    ALT 37 07/24/2023     Lab Results   Component Value Date    ANIONGAP 14 05/09/2024    ANIONGAP 9 11/03/2023    ANIONGAP 12 07/24/2023     Lab Results   Component Value Date    CREATININE 1.0 05/09/2024    CREATININE 1.0 11/03/2023    CREATININE 0.9 07/24/2023     Lab Results   Component Value Date    EGFRNORACEVR >60.0 05/09/2024    EGFRNORACEVR >60 11/03/2023    EGFRNORACEVR >60.0 07/24/2023     Assessment/Plan  - Increase to Zepbound 12.5 mg SQ weekly  - RTC in 3 months for follow-up evaluation    Patient consented to pharmacist management via collaborative practice.

## 2025-07-10 ENCOUNTER — PATIENT MESSAGE (OUTPATIENT)
Dept: PRIMARY CARE CLINIC | Facility: CLINIC | Age: 50
End: 2025-07-10
Payer: COMMERCIAL

## 2025-07-10 DIAGNOSIS — E78.2 MIXED HYPERLIPIDEMIA: ICD-10-CM

## 2025-07-10 DIAGNOSIS — I10 ESSENTIAL HYPERTENSION: ICD-10-CM

## 2025-07-10 DIAGNOSIS — E66.01 MORBID OBESITY WITH BMI OF 45.0-49.9, ADULT: ICD-10-CM

## 2025-07-10 DIAGNOSIS — R73.03 PREDIABETES: ICD-10-CM

## 2025-07-10 DIAGNOSIS — G47.33 OSA (OBSTRUCTIVE SLEEP APNEA): ICD-10-CM

## 2025-07-10 DIAGNOSIS — E78.5 HYPERLIPIDEMIA, UNSPECIFIED HYPERLIPIDEMIA TYPE: ICD-10-CM

## 2025-07-10 DIAGNOSIS — E78.49 OTHER HYPERLIPIDEMIA: ICD-10-CM

## 2025-07-10 DIAGNOSIS — E66.813 CLASS 3 SEVERE OBESITY DUE TO EXCESS CALORIES WITHOUT SERIOUS COMORBIDITY WITH BODY MASS INDEX (BMI) OF 45.0 TO 49.9 IN ADULT: ICD-10-CM

## 2025-07-10 DIAGNOSIS — E66.01 CLASS 3 SEVERE OBESITY DUE TO EXCESS CALORIES WITHOUT SERIOUS COMORBIDITY WITH BODY MASS INDEX (BMI) OF 45.0 TO 49.9 IN ADULT: ICD-10-CM

## 2025-07-15 ENCOUNTER — LAB VISIT (OUTPATIENT)
Dept: LAB | Facility: HOSPITAL | Age: 50
End: 2025-07-15
Payer: COMMERCIAL

## 2025-07-15 DIAGNOSIS — R73.03 PREDIABETES: ICD-10-CM

## 2025-07-15 DIAGNOSIS — E78.2 MIXED HYPERLIPIDEMIA: ICD-10-CM

## 2025-07-15 DIAGNOSIS — E78.5 HYPERLIPIDEMIA, UNSPECIFIED HYPERLIPIDEMIA TYPE: ICD-10-CM

## 2025-07-15 DIAGNOSIS — I10 ESSENTIAL HYPERTENSION: ICD-10-CM

## 2025-07-15 DIAGNOSIS — E66.813 CLASS 3 SEVERE OBESITY DUE TO EXCESS CALORIES WITHOUT SERIOUS COMORBIDITY WITH BODY MASS INDEX (BMI) OF 45.0 TO 49.9 IN ADULT: ICD-10-CM

## 2025-07-15 DIAGNOSIS — G47.33 OSA (OBSTRUCTIVE SLEEP APNEA): ICD-10-CM

## 2025-07-15 DIAGNOSIS — E66.01 MORBID OBESITY WITH BMI OF 45.0-49.9, ADULT: ICD-10-CM

## 2025-07-15 LAB
ABSOLUTE EOSINOPHIL (OHS): 0.2 K/UL
ABSOLUTE MONOCYTE (OHS): 0.58 K/UL (ref 0.3–1)
ABSOLUTE NEUTROPHIL COUNT (OHS): 2.76 K/UL (ref 1.8–7.7)
ALBUMIN SERPL BCP-MCNC: 4.2 G/DL (ref 3.5–5.2)
ALP SERPL-CCNC: 87 UNIT/L (ref 40–150)
ALT SERPL W/O P-5'-P-CCNC: 33 UNIT/L (ref 10–44)
ANION GAP (OHS): 10 MMOL/L (ref 8–16)
AST SERPL-CCNC: 18 UNIT/L (ref 11–45)
BASOPHILS # BLD AUTO: 0.06 K/UL
BASOPHILS NFR BLD AUTO: 1.1 %
BILIRUB SERPL-MCNC: 1 MG/DL (ref 0.1–1)
BUN SERPL-MCNC: 15 MG/DL (ref 6–20)
CALCIUM SERPL-MCNC: 9.2 MG/DL (ref 8.7–10.5)
CHLORIDE SERPL-SCNC: 104 MMOL/L (ref 95–110)
CHOLEST SERPL-MCNC: 149 MG/DL (ref 120–199)
CHOLEST/HDLC SERPL: 4.7 {RATIO} (ref 2–5)
CO2 SERPL-SCNC: 22 MMOL/L (ref 23–29)
CREAT SERPL-MCNC: 1 MG/DL (ref 0.5–1.4)
EAG (OHS): 80 MG/DL (ref 68–131)
ERYTHROCYTE [DISTWIDTH] IN BLOOD BY AUTOMATED COUNT: 11.9 % (ref 11.5–14.5)
GFR SERPLBLD CREATININE-BSD FMLA CKD-EPI: >60 ML/MIN/1.73/M2
GLUCOSE SERPL-MCNC: 88 MG/DL (ref 70–110)
HBA1C MFR BLD: 4.4 % (ref 4–5.6)
HCT VFR BLD AUTO: 47.6 % (ref 40–54)
HDLC SERPL-MCNC: 32 MG/DL (ref 40–75)
HDLC SERPL: 21.5 % (ref 20–50)
HGB BLD-MCNC: 16 GM/DL (ref 14–18)
IMM GRANULOCYTES # BLD AUTO: 0.02 K/UL (ref 0–0.04)
IMM GRANULOCYTES NFR BLD AUTO: 0.4 % (ref 0–0.5)
LDLC SERPL CALC-MCNC: 85.8 MG/DL (ref 63–159)
LYMPHOCYTES # BLD AUTO: 1.93 K/UL (ref 1–4.8)
MCH RBC QN AUTO: 31.4 PG (ref 27–31)
MCHC RBC AUTO-ENTMCNC: 33.6 G/DL (ref 32–36)
MCV RBC AUTO: 93 FL (ref 82–98)
NONHDLC SERPL-MCNC: 117 MG/DL
NUCLEATED RBC (/100WBC) (OHS): 0 /100 WBC
PLATELET # BLD AUTO: 229 K/UL (ref 150–450)
PMV BLD AUTO: 10.6 FL (ref 9.2–12.9)
POTASSIUM SERPL-SCNC: 4.3 MMOL/L (ref 3.5–5.1)
PROT SERPL-MCNC: 7.3 GM/DL (ref 6–8.4)
RBC # BLD AUTO: 5.1 M/UL (ref 4.6–6.2)
RELATIVE EOSINOPHIL (OHS): 3.6 %
RELATIVE LYMPHOCYTE (OHS): 34.8 % (ref 18–48)
RELATIVE MONOCYTE (OHS): 10.5 % (ref 4–15)
RELATIVE NEUTROPHIL (OHS): 49.6 % (ref 38–73)
SODIUM SERPL-SCNC: 136 MMOL/L (ref 136–145)
TRIGL SERPL-MCNC: 156 MG/DL (ref 30–150)
TSH SERPL-ACNC: 2.2 UIU/ML (ref 0.4–4)
WBC # BLD AUTO: 5.55 K/UL (ref 3.9–12.7)

## 2025-07-15 PROCEDURE — 36415 COLL VENOUS BLD VENIPUNCTURE: CPT | Mod: PO

## 2025-07-15 PROCEDURE — 83718 ASSAY OF LIPOPROTEIN: CPT

## 2025-07-15 PROCEDURE — 84443 ASSAY THYROID STIM HORMONE: CPT

## 2025-07-15 PROCEDURE — 85025 COMPLETE CBC W/AUTO DIFF WBC: CPT

## 2025-07-15 PROCEDURE — 83036 HEMOGLOBIN GLYCOSYLATED A1C: CPT

## 2025-07-15 PROCEDURE — 82040 ASSAY OF SERUM ALBUMIN: CPT

## 2025-07-21 ENCOUNTER — OFFICE VISIT (OUTPATIENT)
Dept: PRIMARY CARE CLINIC | Facility: CLINIC | Age: 50
End: 2025-07-21
Payer: COMMERCIAL

## 2025-07-21 ENCOUNTER — TELEPHONE (OUTPATIENT)
Dept: VASCULAR SURGERY | Facility: CLINIC | Age: 50
End: 2025-07-21
Payer: COMMERCIAL

## 2025-07-21 VITALS
HEART RATE: 89 BPM | WEIGHT: 300.06 LBS | SYSTOLIC BLOOD PRESSURE: 134 MMHG | DIASTOLIC BLOOD PRESSURE: 82 MMHG | BODY MASS INDEX: 39.77 KG/M2 | OXYGEN SATURATION: 98 % | HEIGHT: 73 IN

## 2025-07-21 DIAGNOSIS — G47.33 OSA (OBSTRUCTIVE SLEEP APNEA): ICD-10-CM

## 2025-07-21 DIAGNOSIS — I83.91 VARICOSE VEINS OF RIGHT LOWER EXTREMITY, UNSPECIFIED WHETHER COMPLICATED: ICD-10-CM

## 2025-07-21 DIAGNOSIS — E66.812 CLASS 2 SEVERE OBESITY DUE TO EXCESS CALORIES WITH SERIOUS COMORBIDITY AND BODY MASS INDEX (BMI) OF 39.0 TO 39.9 IN ADULT: ICD-10-CM

## 2025-07-21 DIAGNOSIS — E78.2 MIXED HYPERLIPIDEMIA: ICD-10-CM

## 2025-07-21 DIAGNOSIS — Z87.442 HISTORY OF KIDNEY STONES: ICD-10-CM

## 2025-07-21 DIAGNOSIS — E66.01 CLASS 2 SEVERE OBESITY DUE TO EXCESS CALORIES WITH SERIOUS COMORBIDITY AND BODY MASS INDEX (BMI) OF 39.0 TO 39.9 IN ADULT: ICD-10-CM

## 2025-07-21 DIAGNOSIS — I10 ESSENTIAL HYPERTENSION: ICD-10-CM

## 2025-07-21 DIAGNOSIS — Z00.00 ANNUAL PHYSICAL EXAM: Primary | ICD-10-CM

## 2025-07-21 DIAGNOSIS — I87.2 VENOUS INSUFFICIENCY: Primary | ICD-10-CM

## 2025-07-21 DIAGNOSIS — R73.03 PREDIABETES: ICD-10-CM

## 2025-07-21 PROCEDURE — 3044F HG A1C LEVEL LT 7.0%: CPT | Mod: CPTII,S$GLB,, | Performed by: STUDENT IN AN ORGANIZED HEALTH CARE EDUCATION/TRAINING PROGRAM

## 2025-07-21 PROCEDURE — 3079F DIAST BP 80-89 MM HG: CPT | Mod: CPTII,S$GLB,, | Performed by: STUDENT IN AN ORGANIZED HEALTH CARE EDUCATION/TRAINING PROGRAM

## 2025-07-21 PROCEDURE — 1160F RVW MEDS BY RX/DR IN RCRD: CPT | Mod: CPTII,S$GLB,, | Performed by: STUDENT IN AN ORGANIZED HEALTH CARE EDUCATION/TRAINING PROGRAM

## 2025-07-21 PROCEDURE — 1159F MED LIST DOCD IN RCRD: CPT | Mod: CPTII,S$GLB,, | Performed by: STUDENT IN AN ORGANIZED HEALTH CARE EDUCATION/TRAINING PROGRAM

## 2025-07-21 PROCEDURE — 99396 PREV VISIT EST AGE 40-64: CPT | Mod: S$GLB,,, | Performed by: STUDENT IN AN ORGANIZED HEALTH CARE EDUCATION/TRAINING PROGRAM

## 2025-07-21 PROCEDURE — 3008F BODY MASS INDEX DOCD: CPT | Mod: CPTII,S$GLB,, | Performed by: STUDENT IN AN ORGANIZED HEALTH CARE EDUCATION/TRAINING PROGRAM

## 2025-07-21 PROCEDURE — 3075F SYST BP GE 130 - 139MM HG: CPT | Mod: CPTII,S$GLB,, | Performed by: STUDENT IN AN ORGANIZED HEALTH CARE EDUCATION/TRAINING PROGRAM

## 2025-07-21 PROCEDURE — 99999 PR PBB SHADOW E&M-EST. PATIENT-LVL IV: CPT | Mod: PBBFAC,,, | Performed by: STUDENT IN AN ORGANIZED HEALTH CARE EDUCATION/TRAINING PROGRAM

## 2025-07-21 NOTE — TELEPHONE ENCOUNTER
----- Message from Nurse Restrepo sent at 7/21/2025 10:18 AM CDT -----    ----- Message -----  From: Cleo Coon  Sent: 7/21/2025   9:44 AM CDT  To: [facility] Vascular Surgery Clinical Support    Dr. Belia Laws has put in a referral for Consult to Vascular Surgery. Please assist in scheduling.    Varicose veins of right lower extremity, unspecified whether complicated [I83.91..    Thanks

## 2025-07-21 NOTE — PROGRESS NOTES
Fitz Venegas Jr.  1975        Subjective     Chief Complaint: Annual    History of Present Illness:  Mr. Fitz Venegas Jr. is a 50 y.o. male who presents to clinic for annual.     HLD- some high TG but improving.   Elevated ApoB.   LDL down to 85.   Sees [facility], Dr. White. S/p aangio-neg.    Has been losing weight, has lost about 100 lbs.  BMI down to 39.  On Zepbound. Increased to 12.5 mg.  [facility].     Lab Results   Component Value Date    HGBA1C 4.4 07/15/2025      CASANDRA- uses CPAP.   Needs new ENT/Sleep medicine for f/u.    Wt Readings from Last 10 Encounters:   07/21/25 (!) 136.1 kg (300 lb 0.7 oz)   07/26/24 (!) 152.4 kg (335 lb 15.7 oz)   05/14/24 (!) 166.9 kg (368 lb)   05/09/24 (!) 167.1 kg (368 lb 6.2 oz)   11/10/23 (!) 166.7 kg (367 lb 9.9 oz)   11/03/23 (!) 156.5 kg (345 lb)   07/24/23 (!) 162.5 kg (358 lb 4 oz)   04/12/23 (!) 149.7 kg (330 lb)   04/03/23 (!) 156.5 kg (345 lb)   01/20/23 (!) 161.5 kg (356 lb 0.7 oz)     The 10-year ASCVD risk score (Handy DK, et al., 2019) is: 3.2%    Values used to calculate the score:      Age: 50 years      Sex: Male      Is Non- : No      Diabetic: No      Tobacco smoker: No      Systolic Blood Pressure: 134 mmHg      Is BP treated: No      HDL Cholesterol: 32 mg/dL      Total Cholesterol: 149 mg/dL     BP Readings from Last 5 Encounters:   07/21/25 134/82   07/26/24 118/72   05/14/24 138/86   05/09/24 138/86   11/10/23 139/88         Review of Systems   Constitutional:  Negative for chills, fever and malaise/fatigue.   Respiratory:  Negative for shortness of breath.    Cardiovascular:  Negative for chest pain and leg swelling.   Gastrointestinal:  Negative for abdominal pain.   Genitourinary:  Negative for dysuria.   Musculoskeletal:  Negative for joint pain.   Neurological:  Negative for sensory change, speech change, focal weakness and weakness.   Psychiatric/Behavioral:  The patient is not nervous/anxious.      "    PAST HISTORY:     Past Medical History:   Diagnosis Date    Gout, unspecified     Kidney stones     Obesity (BMI 30-39.9) 04/27/2016    CASANDRA (obstructive sleep apnea)     Septic joint of left knee joint 2011       Past Surgical History:   Procedure Laterality Date    COLONOSCOPY N/A 4/12/2023    Procedure: COLONOSCOPY;  Surgeon: Fitz Lucia MD;  Location: [facility] ENDO (4TH FLR);  Service: Gastroenterology;  Laterality: N/A;  4/3 instructions to portal-st  precall done/arrival time of 830 confirmed/mleone lpn    CORONARY ANGIOGRAPHY N/A 5/3/2019    Procedure: ANGIOGRAM, CORONARY ARTERY;  Surgeon: Joey Carter MD;  Location: [facility] CATH LAB;  Service: Cardiology;  Laterality: N/A;    KNEE ARTHROSCOPY Left     LEFT HEART CATHETERIZATION Right 5/3/2019    Procedure: Left heart cath;  Surgeon: Joey Carter MD;  Location: [facility] CATH LAB;  Service: Cardiology;  Laterality: Right;       Family History   Problem Relation Name Age of Onset    Heart attack Father  53    Heart disease Paternal Uncle          coronary stents    Coronary artery disease Paternal Grandfather          CABG and multiple stents    Hypertension Maternal Uncle           MEDICATIONS & ALLERGIES:     Current Outpatient Medications on File Prior to Visit   Medication Sig    tirzepatide, weight loss, (ZEPBOUND) 12.5 mg/0.5 mL PnIj Inject 12.5 mg into the skin every 7 days.     No current facility-administered medications on file prior to visit.       Review of patient's allergies indicates:  No Known Allergies    OBJECTIVE:     Vital Signs:  Vitals:    07/21/25 0901   BP: 134/82   BP Location: Left arm   Patient Position: Sitting   Pulse: 89   SpO2: 98%   Weight: (!) 136.1 kg (300 lb 0.7 oz)   Height: 6' 1" (1.854 m)       Body mass index is 39.59 kg/m².     Physical Exam:  Physical Exam  Vitals and nursing note reviewed.   Constitutional:       General: He is not in acute distress.     Appearance: Normal appearance. He is not ill-appearing, " "toxic-appearing or diaphoretic.   HENT:      Head: Normocephalic and atraumatic.   Eyes:      General: No scleral icterus.     Conjunctiva/sclera: Conjunctivae normal.   Pulmonary:      Effort: Pulmonary effort is normal. No respiratory distress.   Neurological:      Mental Status: He is alert and oriented to person, place, and time. Mental status is at baseline.   Psychiatric:         Mood and Affect: Mood normal.         Behavior: Behavior normal.            Laboratory  Lab Results   Component Value Date    GLU 88 07/15/2025     07/15/2025    K 4.3 07/15/2025     07/15/2025    CO2 22 (L) 07/15/2025    BUN 15 07/15/2025    CREATININE 1.0 07/15/2025    CALCIUM 9.2 07/15/2025    MG 1.9 05/03/2019     Lab Results   Component Value Date    HGBA1C 4.4 07/15/2025     No results for input(s): "POCTGLUCOSE" in the last 72 hours.        ASSESSMENT & PLAN:   Mr. Fitz Venegas Elisabet is a 50 y.o. male who was seen today in clinic for annual. Re-check Lipid panel in . Discussed lifestyle changes for TG/    1. Annual physical exam    2. Mixed hyperlipidemia  -     Lipid Panel; Future; Expected date: 10/21/2025    3. Class 2 severe obesity due to excess calories with serious comorbidity and body mass index (BMI) of 39.0 to 39.9 in adult  -     Vitamin B12; Future; Expected date: 07/21/2025  -     Vitamin D; Future; Expected date: 07/21/2025    4. Prediabetes    5. CASANDRA (obstructive sleep apnea)  -     Ambulatory referral/consult to Sleep Disorders; Future; Expected date: 07/28/2025  -     Cancel: Ambulatory referral/consult to ENT; Future; Expected date: 07/28/2025    6. Essential hypertension  -     Hypertension Digital Medicine (HDMP) Enrollment Order    7. Varicose veins of right lower extremity, unspecified whether complicated  -     Ambulatory referral/consult to Vascular Surgery; Future; Expected date: 07/28/2025    8. History of kidney stones           Belia Laws MD         "

## 2025-08-05 ENCOUNTER — HOSPITAL ENCOUNTER (OUTPATIENT)
Dept: RADIOLOGY | Facility: OTHER | Age: 50
Discharge: HOME | End: 2025-08-05
Attending: SURGERY
Payer: COMMERCIAL

## 2025-08-05 DIAGNOSIS — I87.2 VENOUS INSUFFICIENCY: ICD-10-CM

## 2025-08-05 PROCEDURE — 93970 EXTREMITY STUDY: CPT | Mod: TC

## 2025-08-05 PROCEDURE — 93970 EXTREMITY STUDY: CPT | Mod: 26,,, | Performed by: RADIOLOGY

## 2025-08-22 ENCOUNTER — PATIENT MESSAGE (OUTPATIENT)
Dept: ADMINISTRATIVE | Facility: OTHER | Age: 50
End: 2025-08-22
Payer: COMMERCIAL